# Patient Record
Sex: FEMALE | Race: OTHER | Employment: OTHER | URBAN - METROPOLITAN AREA
[De-identification: names, ages, dates, MRNs, and addresses within clinical notes are randomized per-mention and may not be internally consistent; named-entity substitution may affect disease eponyms.]

---

## 2022-02-16 ENCOUNTER — APPOINTMENT (OUTPATIENT)
Dept: GENERAL RADIOLOGY | Age: 57
End: 2022-02-16
Payer: MEDICARE

## 2022-02-16 ENCOUNTER — HOSPITAL ENCOUNTER (OUTPATIENT)
Age: 57
Setting detail: OBSERVATION
Discharge: HOME | End: 2022-02-19
Attending: STUDENT IN AN ORGANIZED HEALTH CARE EDUCATION/TRAINING PROGRAM | Admitting: INTERNAL MEDICINE
Payer: MEDICARE

## 2022-02-16 ENCOUNTER — APPOINTMENT (OUTPATIENT)
Dept: CT IMAGING | Age: 57
End: 2022-02-16
Payer: MEDICARE

## 2022-02-16 DIAGNOSIS — G89.29 OTHER CHRONIC PAIN: ICD-10-CM

## 2022-02-16 DIAGNOSIS — M54.9 CHRONIC BACK PAIN, UNSPECIFIED BACK LOCATION, UNSPECIFIED BACK PAIN LATERALITY: ICD-10-CM

## 2022-02-16 DIAGNOSIS — J45.909 UNCOMPLICATED ASTHMA, UNSPECIFIED ASTHMA SEVERITY, UNSPECIFIED WHETHER PERSISTENT: ICD-10-CM

## 2022-02-16 DIAGNOSIS — J44.9 CHRONIC OBSTRUCTIVE PULMONARY DISEASE, UNSPECIFIED COPD TYPE (HCC): ICD-10-CM

## 2022-02-16 DIAGNOSIS — S09.90XA CLOSED HEAD INJURY, INITIAL ENCOUNTER: ICD-10-CM

## 2022-02-16 DIAGNOSIS — G89.29 CHRONIC BACK PAIN, UNSPECIFIED BACK LOCATION, UNSPECIFIED BACK PAIN LATERALITY: ICD-10-CM

## 2022-02-16 DIAGNOSIS — F32.A DEPRESSION: ICD-10-CM
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
## 2022-02-16 LAB
ALBUMIN SERPL-MCNC: 4.4 G/DL (ref 3.5–4.6)
ALP BLD-CCNC: 119 U/L (ref 40–130)
ALT SERPL-CCNC: 30 U/L (ref 0–33)
ANION GAP SERPL CALCULATED.3IONS-SCNC: 14 MEQ/L (ref 9–15)
AST SERPL-CCNC: 26 U/L (ref 0–35)
BILIRUB SERPL-MCNC: <0.2 MG/DL (ref 0.2–0.7)
BUN BLDV-MCNC: 17 MG/DL (ref 6–20)
CALCIUM SERPL-MCNC: 8.8 MG/DL (ref 8.5–9.9)
CHLORIDE BLD-SCNC: 101 MEQ/L (ref 95–107)
CO2: 26 MEQ/L (ref 20–31)
CREAT SERPL-MCNC: 1.51 MG/DL (ref 0.5–0.9)
ETHANOL PERCENT: NORMAL G/DL
ETHANOL: <10 MG/DL (ref 0–0.08)
GFR AFRICAN AMERICAN: 42.7
GFR NON-AFRICAN AMERICAN: 35.3
GLOBULIN: 2.8 G/DL (ref 2.3–3.5)
GLUCOSE BLD-MCNC: 85 MG/DL (ref 70–99)
MAGNESIUM: 2.4 MG/DL (ref 1.7–2.4)
POTASSIUM SERPL-SCNC: 3.1 MEQ/L (ref 3.4–4.9)
SODIUM BLD-SCNC: 141 MEQ/L (ref 135–144)
TOTAL CK: 238 U/L (ref 0–170)
TOTAL PROTEIN: 7.2 G/DL (ref 6.3–8)

## 2022-02-16 PROCEDURE — 80307 DRUG TEST PRSMV CHEM ANLYZR: CPT

## 2022-02-16 PROCEDURE — 2580000003 HC RX 258: Performed by: STUDENT IN AN ORGANIZED HEALTH CARE EDUCATION/TRAINING PROGRAM

## 2022-02-16 PROCEDURE — 6360000002 HC RX W HCPCS: Performed by: STUDENT IN AN ORGANIZED HEALTH CARE EDUCATION/TRAINING PROGRAM

## 2022-02-16 PROCEDURE — 73502 X-RAY EXAM HIP UNI 2-3 VIEWS: CPT

## 2022-02-16 PROCEDURE — 84484 ASSAY OF TROPONIN QUANT: CPT

## 2022-02-16 PROCEDURE — 36415 COLL VENOUS BLD VENIPUNCTURE: CPT

## 2022-02-16 PROCEDURE — 81001 URINALYSIS AUTO W/SCOPE: CPT

## 2022-02-16 PROCEDURE — 71046 X-RAY EXAM CHEST 2 VIEWS: CPT

## 2022-02-16 PROCEDURE — 82077 ASSAY SPEC XCP UR&BREATH IA: CPT

## 2022-02-16 PROCEDURE — 70450 CT HEAD/BRAIN W/O DYE: CPT

## 2022-02-16 PROCEDURE — 82550 ASSAY OF CK (CPK): CPT

## 2022-02-16 PROCEDURE — 83735 ASSAY OF MAGNESIUM: CPT

## 2022-02-16 PROCEDURE — 6370000000 HC RX 637 (ALT 250 FOR IP): Performed by: STUDENT IN AN ORGANIZED HEALTH CARE EDUCATION/TRAINING PROGRAM

## 2022-02-16 PROCEDURE — 82553 CREATINE MB FRACTION: CPT

## 2022-02-16 PROCEDURE — 84443 ASSAY THYROID STIM HORMONE: CPT

## 2022-02-16 PROCEDURE — 80053 COMPREHEN METABOLIC PANEL: CPT

## 2022-02-16 PROCEDURE — 96365 THER/PROPH/DIAG IV INF INIT: CPT

## 2022-02-16 RX ORDER — ACETAMINOPHEN 500 MG
1000 TABLET ORAL ONCE
Status: COMPLETED | OUTPATIENT
Start: 2022-02-16 | End: 2022-02-16

## 2022-02-16 RX ORDER — METHOCARBAMOL 500 MG/1
1500 TABLET, FILM COATED ORAL ONCE
Status: COMPLETED | OUTPATIENT
Start: 2022-02-16 | End: 2022-02-16

## 2022-02-16 RX ORDER — MAGNESIUM SULFATE IN WATER 40 MG/ML
2000 INJECTION, SOLUTION INTRAVENOUS ONCE
Status: COMPLETED | OUTPATIENT
Start: 2022-02-16 | End: 2022-02-16

## 2022-02-16 RX ORDER — 0.9 % SODIUM CHLORIDE 0.9 %
1000 INTRAVENOUS SOLUTION INTRAVENOUS ONCE
Status: COMPLETED | OUTPATIENT
Start: 2022-02-16 | End: 2022-02-17

## 2022-02-16 RX ADMIN — METHOCARBAMOL 1500 MG: 500 TABLET ORAL at 22:06

## 2022-02-16 RX ADMIN — SODIUM CHLORIDE 1000 ML: 9 INJECTION, SOLUTION INTRAVENOUS at 22:07

## 2022-02-16 RX ADMIN — ACETAMINOPHEN 1000 MG: 500 TABLET ORAL at 22:06

## 2022-02-16 RX ADMIN — MAGNESIUM SULFATE HEPTAHYDRATE 2000 MG: 2 INJECTION, SOLUTION INTRAVENOUS at 22:07

## 2022-02-16 ASSESSMENT — ENCOUNTER SYMPTOMS
SHORTNESS OF BREATH: 0
NAUSEA: 0
VOMITING: 0
BACK PAIN: 1
EYE PAIN: 0
ABDOMINAL PAIN: 0
FACIAL SWELLING: 0

## 2022-02-16 ASSESSMENT — PAIN SCALES - GENERAL
PAINLEVEL_OUTOF10: 10
PAINLEVEL_OUTOF10: 9

## 2022-02-16 ASSESSMENT — PAIN - FUNCTIONAL ASSESSMENT: PAIN_FUNCTIONAL_ASSESSMENT: 0-10

## 2022-02-17 ENCOUNTER — APPOINTMENT (OUTPATIENT)
Dept: ULTRASOUND IMAGING | Age: 57
End: 2022-02-17
Payer: MEDICARE

## 2022-02-17 ENCOUNTER — APPOINTMENT (OUTPATIENT)
Dept: CT IMAGING | Age: 57
End: 2022-02-17
Payer: MEDICARE

## 2022-02-17 LAB
AMPHETAMINE SCREEN, URINE: ABNORMAL
ANION GAP SERPL CALCULATED.3IONS-SCNC: 12 MEQ/L (ref 9–15)
BACTERIA: NEGATIVE /HPF
BARBITURATE SCREEN URINE: ABNORMAL
BASE EXCESS ARTERIAL: -1 (ref -3–3)
BASOPHILS ABSOLUTE: 0 K/UL (ref 0–0.2)
BASOPHILS ABSOLUTE: 0 K/UL (ref 0–0.2)
BASOPHILS RELATIVE PERCENT: 0.3 %
BASOPHILS RELATIVE PERCENT: 0.5 %
BENZODIAZEPINE SCREEN, URINE: ABNORMAL
BILIRUBIN URINE: NEGATIVE
BLOOD, URINE: NEGATIVE
BUN BLDV-MCNC: 14 MG/DL (ref 6–20)
CALCIUM IONIZED: 1.01 MMOL/L (ref 1.12–1.32)
CALCIUM SERPL-MCNC: 8.1 MG/DL (ref 8.5–9.9)
CANNABINOID SCREEN URINE: POSITIVE
CHLORIDE BLD-SCNC: 108 MEQ/L (ref 95–107)
CK MB: 2.5 NG/ML (ref 0–3.8)
CLARITY: CLEAR
CO2: 25 MEQ/L (ref 20–31)
COCAINE METABOLITE SCREEN URINE: ABNORMAL
COLOR: YELLOW
CREAT SERPL-MCNC: 1.09 MG/DL (ref 0.5–0.9)
CREATINE KINASE-MB INDEX: 1.1 % (ref 0–3.5)
D DIMER: 0.3 MG/L FEU (ref 0–0.5)
D DIMER: 0.3 MG/L FEU (ref 0–0.5)
EOSINOPHILS ABSOLUTE: 0 K/UL (ref 0–0.7)
EOSINOPHILS ABSOLUTE: 0 K/UL (ref 0–0.7)
EOSINOPHILS RELATIVE PERCENT: 0.1 %
EOSINOPHILS RELATIVE PERCENT: 0.1 %
EPITHELIAL CELLS, UA: NORMAL /HPF (ref 0–5)
GFR AFRICAN AMERICAN: 56
GFR AFRICAN AMERICAN: >60
GFR NON-AFRICAN AMERICAN: 46
GFR NON-AFRICAN AMERICAN: 51.4
GLUCOSE BLD-MCNC: 103 MG/DL (ref 70–99)
GLUCOSE BLD-MCNC: 110 MG/DL (ref 70–99)
GLUCOSE URINE: NEGATIVE MG/DL
HBA1C MFR BLD: 6.3 % (ref 4.8–5.9)
HCO3 ARTERIAL: 24.1 MMOL/L (ref 21–29)
HCT VFR BLD CALC: 37 % (ref 37–47)
HCT VFR BLD CALC: 37.2 % (ref 37–47)
HEMOGLOBIN: 12 GM/DL (ref 12–16)
HEMOGLOBIN: 12.4 G/DL (ref 12–16)
HEMOGLOBIN: 12.4 G/DL (ref 12–16)
HYALINE CASTS: NORMAL /HPF (ref 0–5)
KETONES, URINE: NEGATIVE MG/DL
LACTATE: 0.79 MMOL/L (ref 0.4–2)
LEUKOCYTE ESTERASE, URINE: ABNORMAL
LV EF: 60 %
LVEF MODALITY: NORMAL
LYMPHOCYTES ABSOLUTE: 2.2 K/UL (ref 1–4.8)
LYMPHOCYTES ABSOLUTE: 2.7 K/UL (ref 1–4.8)
LYMPHOCYTES RELATIVE PERCENT: 56.3 %
LYMPHOCYTES RELATIVE PERCENT: 57.3 %
Lab: ABNORMAL
MAGNESIUM: 2.8 MG/DL (ref 1.7–2.4)
MCH RBC QN AUTO: 31.5 PG (ref 27–31.3)
MCH RBC QN AUTO: 31.7 PG (ref 27–31.3)
MCHC RBC AUTO-ENTMCNC: 33.2 % (ref 33–37)
MCHC RBC AUTO-ENTMCNC: 33.5 % (ref 33–37)
MCV RBC AUTO: 94.8 FL (ref 82–100)
MCV RBC AUTO: 94.9 FL (ref 82–100)
METHADONE SCREEN, URINE: ABNORMAL
MONOCYTES ABSOLUTE: 0.3 K/UL (ref 0.2–0.8)
MONOCYTES ABSOLUTE: 0.4 K/UL (ref 0.2–0.8)
MONOCYTES RELATIVE PERCENT: 8.7 %
MONOCYTES RELATIVE PERCENT: 8.9 %
NEUTROPHILS ABSOLUTE: 1.3 K/UL (ref 1.4–6.5)
NEUTROPHILS ABSOLUTE: 1.6 K/UL (ref 1.4–6.5)
NEUTROPHILS RELATIVE PERCENT: 33.4 %
NEUTROPHILS RELATIVE PERCENT: 34.4 %
NITRITE, URINE: NEGATIVE
O2 SAT, ARTERIAL: 89 % (ref 93–100)
OPIATE SCREEN URINE: ABNORMAL
OXYCODONE URINE: ABNORMAL
PCO2 ARTERIAL: 40 MM HG (ref 35–45)
PDW BLD-RTO: 16.8 % (ref 11.5–14.5)
PDW BLD-RTO: 16.9 % (ref 11.5–14.5)
PERFORMED ON: ABNORMAL
PH ARTERIAL: 7.38 (ref 7.35–7.45)
PH UA: 5 (ref 5–9)
PHENCYCLIDINE SCREEN URINE: ABNORMAL
PLATELET # BLD: 223 K/UL (ref 130–400)
PLATELET # BLD: 230 K/UL (ref 130–400)
PO2 ARTERIAL: 57 MM HG (ref 75–108)
POC CHLORIDE: 108 MEQ/L (ref 99–110)
POC CREATININE: 1.2 MG/DL (ref 0.6–1.2)
POC HEMATOCRIT: 35 % (ref 36–48)
POC POTASSIUM: 2.7 MEQ/L (ref 3.5–5.1)
POC SAMPLE TYPE: ABNORMAL
POC SODIUM: 145 MEQ/L (ref 136–145)
POTASSIUM REFLEX MAGNESIUM: 3.5 MEQ/L (ref 3.4–4.9)
PROPOXYPHENE SCREEN: ABNORMAL
PROTEIN UA: NEGATIVE MG/DL
RBC # BLD: 3.9 M/UL (ref 4.2–5.4)
RBC # BLD: 3.92 M/UL (ref 4.2–5.4)
RBC UA: NORMAL /HPF (ref 0–5)
SODIUM BLD-SCNC: 145 MEQ/L (ref 135–144)
SPECIFIC GRAVITY UA: 1.01 (ref 1–1.03)
TCO2 ARTERIAL: 25 MMOL/L (ref 21–32)
TROPONIN: <0.01 NG/ML (ref 0–0.01)
TSH REFLEX: 2.49 UIU/ML (ref 0.44–3.86)
URINE REFLEX TO CULTURE: ABNORMAL
UROBILINOGEN, URINE: 0.2 E.U./DL
WBC # BLD: 3.9 K/UL (ref 4.8–10.8)
WBC # BLD: 4.7 K/UL (ref 4.8–10.8)
WBC UA: NORMAL /HPF (ref 0–5)

## 2022-02-17 PROCEDURE — 94664 DEMO&/EVAL PT USE INHALER: CPT

## 2022-02-17 PROCEDURE — 85025 COMPLETE CBC W/AUTO DIFF WBC: CPT

## 2022-02-17 PROCEDURE — 82330 ASSAY OF CALCIUM: CPT

## 2022-02-17 PROCEDURE — 84484 ASSAY OF TROPONIN QUANT: CPT

## 2022-02-17 PROCEDURE — 93306 TTE W/DOPPLER COMPLETE: CPT

## 2022-02-17 PROCEDURE — 87276 INFLUENZA A AG IF: CPT

## 2022-02-17 PROCEDURE — 84132 ASSAY OF SERUM POTASSIUM: CPT

## 2022-02-17 PROCEDURE — 83036 HEMOGLOBIN GLYCOSYLATED A1C: CPT

## 2022-02-17 PROCEDURE — 87260 ADENOVIRUS AG IF: CPT

## 2022-02-17 PROCEDURE — 83605 ASSAY OF LACTIC ACID: CPT

## 2022-02-17 PROCEDURE — 93970 EXTREMITY STUDY: CPT

## 2022-02-17 PROCEDURE — 99223 1ST HOSP IP/OBS HIGH 75: CPT | Performed by: INTERNAL MEDICINE

## 2022-02-17 PROCEDURE — 83735 ASSAY OF MAGNESIUM: CPT

## 2022-02-17 PROCEDURE — 36600 WITHDRAWAL OF ARTERIAL BLOOD: CPT

## 2022-02-17 PROCEDURE — 87279 PARAINFLUENZA AG IF: CPT

## 2022-02-17 PROCEDURE — 93880 EXTRACRANIAL BILAT STUDY: CPT

## 2022-02-17 PROCEDURE — 80048 BASIC METABOLIC PNL TOTAL CA: CPT

## 2022-02-17 PROCEDURE — 85014 HEMATOCRIT: CPT

## 2022-02-17 PROCEDURE — 85379 FIBRIN DEGRADATION QUANT: CPT

## 2022-02-17 PROCEDURE — 6370000000 HC RX 637 (ALT 250 FOR IP): Performed by: STUDENT IN AN ORGANIZED HEALTH CARE EDUCATION/TRAINING PROGRAM

## 2022-02-17 PROCEDURE — G0378 HOSPITAL OBSERVATION PER HR: HCPCS

## 2022-02-17 PROCEDURE — 36415 COLL VENOUS BLD VENIPUNCTURE: CPT

## 2022-02-17 PROCEDURE — 87275 INFLUENZA B AG IF: CPT

## 2022-02-17 PROCEDURE — 84295 ASSAY OF SERUM SODIUM: CPT

## 2022-02-17 PROCEDURE — 6360000004 HC RX CONTRAST MEDICATION: Performed by: INTERNAL MEDICINE

## 2022-02-17 PROCEDURE — 2580000003 HC RX 258: Performed by: INTERNAL MEDICINE

## 2022-02-17 PROCEDURE — 71275 CT ANGIOGRAPHY CHEST: CPT

## 2022-02-17 PROCEDURE — 82803 BLOOD GASES ANY COMBINATION: CPT

## 2022-02-17 PROCEDURE — 82435 ASSAY OF BLOOD CHLORIDE: CPT

## 2022-02-17 PROCEDURE — 99285 EMERGENCY DEPT VISIT HI MDM: CPT

## 2022-02-17 PROCEDURE — 93005 ELECTROCARDIOGRAM TRACING: CPT | Performed by: INTERNAL MEDICINE

## 2022-02-17 PROCEDURE — 82565 ASSAY OF CREATININE: CPT

## 2022-02-17 PROCEDURE — 6370000000 HC RX 637 (ALT 250 FOR IP): Performed by: INTERNAL MEDICINE

## 2022-02-17 PROCEDURE — 87299 ANTIBODY DETECTION NOS IF: CPT

## 2022-02-17 PROCEDURE — 87280 RESPIRATORY SYNCYTIAL AG IF: CPT

## 2022-02-17 RX ORDER — IPRATROPIUM BROMIDE AND ALBUTEROL SULFATE 2.5; .5 MG/3ML; MG/3ML
1 SOLUTION RESPIRATORY (INHALATION) EVERY 4 HOURS PRN
Status: DISCONTINUED | OUTPATIENT
Start: 2022-02-17 | End: 2022-02-19 | Stop reason: HOSPADM

## 2022-02-17 RX ORDER — POTASSIUM CHLORIDE 20 MEQ/1
40 TABLET, EXTENDED RELEASE ORAL ONCE
Status: COMPLETED | OUTPATIENT
Start: 2022-02-17 | End: 2022-02-17

## 2022-02-17 RX ORDER — TIZANIDINE 4 MG/1
4 TABLET ORAL EVERY 8 HOURS PRN
COMMUNITY
End: 2022-09-13

## 2022-02-17 RX ORDER — SODIUM CHLORIDE 9 MG/ML
INJECTION, SOLUTION INTRAVENOUS
Status: DISPENSED
Start: 2022-02-17 | End: 2022-02-18

## 2022-02-17 RX ORDER — PROPRANOLOL HYDROCHLORIDE 40 MG/1
40 TABLET ORAL 2 TIMES DAILY
COMMUNITY

## 2022-02-17 RX ORDER — ONDANSETRON 2 MG/ML
4 INJECTION INTRAMUSCULAR; INTRAVENOUS EVERY 6 HOURS PRN
Status: DISCONTINUED | OUTPATIENT
Start: 2022-02-17 | End: 2022-02-19 | Stop reason: HOSPADM

## 2022-02-17 RX ORDER — ACETAMINOPHEN 325 MG/1
650 TABLET ORAL EVERY 6 HOURS PRN
Status: DISCONTINUED | OUTPATIENT
Start: 2022-02-17 | End: 2022-02-19 | Stop reason: HOSPADM

## 2022-02-17 RX ORDER — PREDNISONE 20 MG/1
40 TABLET ORAL DAILY
Status: DISCONTINUED | OUTPATIENT
Start: 2022-02-17 | End: 2022-02-19 | Stop reason: HOSPADM

## 2022-02-17 RX ORDER — TOPIRAMATE 100 MG/1
100 TABLET, FILM COATED ORAL 2 TIMES DAILY
COMMUNITY

## 2022-02-17 RX ORDER — SODIUM CHLORIDE, SODIUM LACTATE, POTASSIUM CHLORIDE, CALCIUM CHLORIDE 600; 310; 30; 20 MG/100ML; MG/100ML; MG/100ML; MG/100ML
INJECTION, SOLUTION INTRAVENOUS CONTINUOUS
Status: DISCONTINUED | OUTPATIENT
Start: 2022-02-17 | End: 2022-02-19 | Stop reason: HOSPADM

## 2022-02-17 RX ORDER — TOPIRAMATE 25 MG/1
25 TABLET ORAL 2 TIMES DAILY
COMMUNITY

## 2022-02-17 RX ORDER — SODIUM CHLORIDE 0.9 % (FLUSH) 0.9 %
10 SYRINGE (ML) INJECTION 2 TIMES DAILY
Status: DISCONTINUED | OUTPATIENT
Start: 2022-02-17 | End: 2022-02-19 | Stop reason: HOSPADM

## 2022-02-17 RX ORDER — FLUTICASONE PROPIONATE 50 MCG
1 SPRAY, SUSPENSION (ML) NASAL DAILY
Status: DISCONTINUED | OUTPATIENT
Start: 2022-02-17 | End: 2022-02-19 | Stop reason: HOSPADM

## 2022-02-17 RX ORDER — IPRATROPIUM BROMIDE AND ALBUTEROL SULFATE 2.5; .5 MG/3ML; MG/3ML
1 SOLUTION RESPIRATORY (INHALATION)
Status: DISCONTINUED | OUTPATIENT
Start: 2022-02-17 | End: 2022-02-17

## 2022-02-17 RX ORDER — PANTOPRAZOLE SODIUM 40 MG/1
40 GRANULE, DELAYED RELEASE ORAL
COMMUNITY
End: 2022-09-13

## 2022-02-17 RX ORDER — ACETAMINOPHEN 650 MG/1
650 SUPPOSITORY RECTAL EVERY 6 HOURS PRN
Status: DISCONTINUED | OUTPATIENT
Start: 2022-02-17 | End: 2022-02-19 | Stop reason: HOSPADM

## 2022-02-17 RX ORDER — SERTRALINE HYDROCHLORIDE 100 MG/1
100 TABLET, FILM COATED ORAL DAILY
Status: DISCONTINUED | OUTPATIENT
Start: 2022-02-17 | End: 2022-02-19 | Stop reason: HOSPADM

## 2022-02-17 RX ORDER — POLYETHYLENE GLYCOL 3350 17 G/17G
17 POWDER, FOR SOLUTION ORAL DAILY PRN
Status: DISCONTINUED | OUTPATIENT
Start: 2022-02-17 | End: 2022-02-19 | Stop reason: HOSPADM

## 2022-02-17 RX ORDER — ONDANSETRON 4 MG/1
4 TABLET, ORALLY DISINTEGRATING ORAL EVERY 8 HOURS PRN
Status: DISCONTINUED | OUTPATIENT
Start: 2022-02-17 | End: 2022-02-19 | Stop reason: HOSPADM

## 2022-02-17 RX ORDER — ROSUVASTATIN CALCIUM 40 MG/1
40 TABLET, COATED ORAL EVERY EVENING
Status: DISCONTINUED | OUTPATIENT
Start: 2022-02-17 | End: 2022-02-19 | Stop reason: HOSPADM

## 2022-02-17 RX ADMIN — POTASSIUM CHLORIDE 40 MEQ: 1500 TABLET, EXTENDED RELEASE ORAL at 02:24

## 2022-02-17 RX ADMIN — SODIUM CHLORIDE, POTASSIUM CHLORIDE, SODIUM LACTATE AND CALCIUM CHLORIDE: 600; 310; 30; 20 INJECTION, SOLUTION INTRAVENOUS at 06:09

## 2022-02-17 RX ADMIN — LEVOTHYROXINE SODIUM 75 MCG: 0.03 TABLET ORAL at 06:04

## 2022-02-17 RX ADMIN — IOPAMIDOL 100 ML: 612 INJECTION, SOLUTION INTRAVENOUS at 17:33

## 2022-02-17 RX ADMIN — Medication 650 MG: at 16:40

## 2022-02-17 RX ADMIN — ROSUVASTATIN CALCIUM 40 MG: 40 TABLET, FILM COATED ORAL at 20:41

## 2022-02-17 RX ADMIN — SODIUM CHLORIDE, POTASSIUM CHLORIDE, SODIUM LACTATE AND CALCIUM CHLORIDE: 600; 310; 30; 20 INJECTION, SOLUTION INTRAVENOUS at 16:38

## 2022-02-17 RX ADMIN — PREDNISONE 40 MG: 20 TABLET ORAL at 09:36

## 2022-02-17 RX ADMIN — Medication 650 MG: at 09:35

## 2022-02-17 RX ADMIN — POTASSIUM CHLORIDE 40 MEQ: 1500 TABLET, EXTENDED RELEASE ORAL at 06:04

## 2022-02-17 RX ADMIN — SERTRALINE 100 MG: 100 TABLET, FILM COATED ORAL at 09:36

## 2022-02-17 ASSESSMENT — PAIN SCALES - GENERAL
PAINLEVEL_OUTOF10: 10
PAINLEVEL_OUTOF10: 6

## 2022-02-17 ASSESSMENT — PAIN DESCRIPTION - DESCRIPTORS: DESCRIPTORS: ACHING

## 2022-02-17 ASSESSMENT — PAIN DESCRIPTION - LOCATION: LOCATION: GENERALIZED
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
## 2022-02-17 NOTE — PROGRESS NOTES
[facility] EMERGENCY [facility] AT [place] Respiratory Therapy Evaluation   Current Order:  Duoneb Q4 W/A     Home Regimen: PRN      Ordering Physician: Tanisha  Re-evaluation Date:  ---     Diagnosis: Weakness      Patient Status: Stable / Unstable + Physician notified    The following MDI Criteria must be met in order to convert aerosol to MDI with spacer. If unable to meet, MDI will be converted to aerosol:  []  Patient able to demonstrate the ability to use MDI effectively  []  Patient alert and cooperative  []  Patient able to take deep breath with 5-10 second hold  []  Medication(s) available in this delivery method   []  Peak flow greater than or equal to 200 ml/min            Current Order Substituted To  (same drug, same frequency)   Aerosol to MDI [] Albuterol Sulfate 0.083% unit dose by aerosol Albuterol Sulfate MDI 2 puffs by inhalation with spacer    [] Levalbuterol 1.25 mg unit dose by aerosol Levalbuterol MDI 2 puffs by inhalation with spacer    [] Levalbuterol 0.63 mg unit dose by aerosol Levalbuterol MDI 2 puffs by inhalation with spacer    [] Ipratropium Bromide 0.02% unit dose by aerosol Ipratropium Bromide MDI 2 puffs by inhalation with spacer    [] Duoneb (Ipratropium + Albuterol) unit dose by aerosol Ipratropium MDI + Albuterol MDI 2 puffs by inhalation w/spacer   MDI to Aerosol [] Albuterol Sulfate MDI Albuterol Sulfate 0.083% unit dose by aerosol    [] Levalbuterol MDI 2 puffs by inhalation Levalbuterol 1.25 mg unit dose by aerosol    [] Ipratropium Bromide MDI by inhalation Ipratropium Bromide 0.02% unit dose by aerosol    [] Combivent (Ipratropium + Albuterol) MDI by inhalation Duoneb (Ipratropium + Albuterol) unit dose by aerosol       Treatment Assessment [Frequency/Schedule]:  Change frequency to: ___________Duoneb Q4 PRN_______________________________________per Protocol, P&T, MEC      Points 0 1 2 3 4   Pulmonary Status  Non-Smoker  []   Smoking history   < 20 pack years  []   Smoking history  ?  20 pack years  [x] Pulmonary Disorder  (acute or chronic)  []   Severe or Chronic w/ Exacerbation  []     Surgical Status No [x]   Surgeries     General []   Surgery Lower []   Abdominal Thoracic or []   Upper Abdominal Thoracic with  PulmonaryDisorder  []     Chest X-ray Clear/Not  Ordered     []  Chronic Changes  Results Pending  [x]  Infiltrates, atelectasis, pleural effusion, or edema  []  Infiltrates in more than one lobe []  Infiltrate + Atelectasis, &/or pleural effusion  []    Respiratory Pattern Regular,  RR = 12-20 [x]  Increased,  RR = 21-25 []  ROBISON, irregular,  or RR = 26-30 []  Decreased FEV1  or RR = 31-35 []  Severe SOB, use  of accessory muscles, or RR ? 35  []    Mental Status Alert, oriented,  Cooperative [x]  Confused but Follows commands []  Lethargic or unable to follow commands []  Obtunded  []  Comatose  []    Breath Sounds Clear to  auscultation  []  Decreased unilaterally or  in bases only []  Decreased  bilaterally  [x]  Crackles or intermittent wheezes []  Wheezes []    Cough Strong, Spontan., & nonproductive [x]  Strong,  spontaneous, &  productive []  Weak,  Nonproductive []  Weak, productive or  with wheezes []  No spontaneous  cough or may require suctioning []    Level of Activity Ambulatory [x]  Ambulatory w/ Assist  []  Non-ambulatory []  Paraplegic []  Quadriplegic []    Total    Score:___5____     Triage Score:___5_____      Tri       Triage:     1. (>20) Freq: Q3    2. (16-20) Freq: Q4   3. (11-15) Freq: QID & Albuterol Q2 PRN    4. (6-10) Freq: TID & Albuterol Q2 PRN    5. (0-5) Freq Q4prn

## 2022-02-17 NOTE — PROGRESS NOTES
Agree with plan as per my colleagues H/P from today. Will check RVP. discussed with Lorenda Frankel.

## 2022-02-17 NOTE — ED TRIAGE NOTES
Pt presents to ER by squad after falling down about five steps just prior to arrival. The fall was witness by family. Family states patient did not hit her head, patient states she did hit her head. No loss of consciousness. Pt is complaining of pain in the posterior head and right hip pain. There are no obvious deformities or uncontrolled bleeding. Pt is pink, warm, dry, active and alert. There is no distress noted and vitals are stable. Pt takes Xarelto.

## 2022-02-17 NOTE — ED NOTES
Bed: 09  Expected date: 2/16/22  Expected time: 9:15 PM  Means of arrival: Ambulance  Comments:  59year old female left hip pain.       Aiarm Bettencourt RN  02/16/22 4470

## 2022-02-17 NOTE — FLOWSHEET NOTE
Pt arrived to the floor. Skin assessment performed with [facility] RN. Skin intact and pt has trace edema in the bilateral lower extremities.

## 2022-02-17 NOTE — H&P
Hospital Medicine  History and Physical    Patient:  Ellie Norris  MRN: [id number]    CHIEF COMPLAINT:    Chief Complaint   Patient presents with    Fall       History Obtained From:  patient, electronic medical record  Primary Care Physician: Shashank Tsang MD    HISTORY OF PRESENT ILLNESS:   The patient is a 59 y.o. female who presents with syncope from home twice today. Patient is a 70-year-old female she has a history of Chiari malformation type I status post craniotomy and surgical repair in [place], hypertension, hyperlipidemia, COPD/asthma overlap syndrome, hypothyroid disease, DVT anticoagulated on Eliquis, GERD without history of PUD, IBS, chronic pain, fibromyalgia, migraine headaches,. Patient states that she was in her nephew's room when he kicked her in a playful manner she tripped and fell over and on the symbol bunkbed hit her head on the box her family came to help her up and when they are walking on the stairs afterwards she had near syncope and collapsed on the stairs. Patient states she did not lose consciousness but felt dizzy and lightheaded with a rush of warmth prior to falling. This has not happened before this is not happened when she lived in [place] however she has had a reported history of heart failure in the past.  She has had some cardiac work-up including stress testing in the past but does not remember exactly when. Chart review also shows that she has been currently following with physical therapy throughout most of 2020 for frequent falls this was also [place]. On arrival to [facility] patient is afebrile vital signs are stable she saturates well on room air. Basic metabolic panel shows a potassium of 3.1 with a BUN and creatinine of 17 and 1.51 troponin is negative, LFTs benign. CBC is pending. EKG is pending. CT head shows no acute process does report chronic craniotomy.   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    24 hour urine studies and aldosterone test negative     Chronic pain     Deep venous thrombosis ([address] 2011    Depression     Essential hypertension     Fibromyalgia     GERD (gastroesophageal reflux disease)     Heat intolerance     Hip pain     History of craniotomy 2007    CHIARI MALFORMATION-[place], [place]    Hyperlipidemia 2009    IBS (irritable bowel syndrome)     Meralgia paraesthetica     Migraine headache     Obesity     Osteopenia     Pedal edema     Sleep apnea     Umbilical hernia     Uterine fibroid        Past Surgical History:      Procedure Laterality Date     SECTION      x3    COLONOSCOPY  2013    normal    CRANIOTOMY  2007    CHIARI MALFORMATION [place]    FIBULA FRACTURE SURGERY  2009    ORIF right tibia and fibula     ORIF FEMUR DECOMPRESSION      right    PATELLA SURGERY  2007    partial resection right patella     TONSILLECTOMY      UPPER GASTROINTESTINAL ENDOSCOPY  2013       Medications Prior to Admission:    Prior to Admission medications    Medication Sig Start Date End Date Taking? Authorizing Provider   gabapentin (NEURONTIN) 800 MG tablet Take 1 tablet by mouth 3 times daily 16   Vidya Severino MD   furosemide (LASIX) 40 MG tablet Take 1 tablet by mouth daily as needed (swelling) 16   Vidya Severino MD   ketoconazole (NIZORAL) 2 % cream Apply topically daily Apply topically daily.     Vidya Severino MD   albuterol sulfate HFA (PROVENTIL HFA) 108 (90 BASE) MCG/ACT inhaler Inhale 2 puffs into the lungs every 6 hours as needed for Wheezing 16  Vidya Severino MD   sertraline (ZOLOFT) 100 MG tablet 2 tabs daily 16   Vidya Severino MD   LYRICA 225 MG capsule Take 1 capsule by mouth 2 times daily 6/1/16   Hudson Olvera MD   nortriptyline (PAMELOR) 25 MG capsule Take 50 mg by mouth nightly    Historical Provider, MD   rosuvastatin (CRESTOR) 40 MG tablet Take 1 tablet by mouth every evening 6/16/16   Nisha Sebastian MD   potassium chloride SA (K-DUR;KLOR-CON M) 20 MEQ tablet Take 1 tablet by mouth daily 6/16/16   Nisha Sebastian MD   baclofen (LIORESAL) 10 MG tablet Take 1 tablet by mouth 3 times daily 6/16/16   Nisha Sebastian MD   triamterene-hydrochlorothiazide [facility]) 37.5-25 MG per tablet Take 1 tablet by mouth daily 6/16/16   Nisha Sebastian MD   flut[facility]asone [facility]) 220 MCG/ACT inhaler Inhale 2 puffs into the lungs 2 times daily 6/16/16 6/16/17  Nisha Sebastian MD   levothyroxine (LEVOTHROID) 75 MCG tablet Take 1 tablet by mouth daily 6/16/16   Nisha Sebastian MD   tiotropium (Buzz Lean) 18 MCG inhalation capsule Inhale 1 capsule into the lungs daily 6/16/16   Nisha Sebastian MD   rosuvastatin (CRESTOR) 40 MG tablet Take 1 tablet by mouth every evening 4/12/16   Nisha Sebastian MD   DULoxetine (CYMBALTA) 60 MG capsule Take 1 capsule by mouth 2 times daily 3/16/16   Nisha Sebastian MD   dicyclomine (BENTYL) 20 MG tablet Take 1 tablet by mouth three times daily 3/16/16   Nisha Sebastian MD   fluticasone [facility]) 50 MCG/ACT nasal spray 1 spray by Nasal route daily. 4/21/15   Nisha Sebastian MD       Allergies:  Latex, Aspirin, Niaspan [niacin er], and Simvastatin    Social History:   TOBACCO:   reports that she quit smoking about 5 years ago. Her smoking use included cigarettes. She smoked 0.25 packs per day. She has never used smokeless tobacco.  ETOH:   reports no history of alcohol use.   OCCUPATION: None    Family History:       Problem Relation Age of Onset    Other Father         ALS    Cancer Maternal Grandmother     Cancer Maternal Grandfather     Cancer Paternal Grandmother     Cancer Paternal Grandfather        REVIEW OF SYSTEMS:  Ten systems reviewed and negative except for as above. Physical Exam:    Vitals: BP (!) 91/55   Pulse 76   Temp 97.7 °F (36.5 °C) (Oral)   Resp 13   Ht 5' 5\" (1.651 m)   Wt 202 lb (91.6 kg)   LMP 07/30/2014   SpO2 97%   BMI 33.61 kg/m²   Constitutional: alert, appears stated age and cooperative  Skin: Skin color, texture, turgor normal. No rashes or lesions  Eyes:Eye: Normal external eye, conjunctiva, LUIS ANTONIO. ENT: Head: Normocephalic, no lesions, without obvious abnormality. Neck: no adenopathy, no carotid bruit, no JVD, supple, symmetrical, trachea midline and thyroid not enlarged, symmetric, no tenderness/mass/nodules  Respiratory: Coarse wheezes throughout bilateral breath sounds good air movement  Cardiovascular: regular rate and rhythm, S1, S2 normal, no murmur, click, rub or gallop  Gastrointestinal: soft, non-tender; bowel sounds normal; no masses,  no organomegaly  Genitourinary: Deferred  Musculoskeletal:extremities normal, atraumatic, no cyanosis or edema  Neurologic: Mental status AAOx3 No facial asymmetry or droop. Normal muscle strength b/l. Psychiatric: Appropriate mood and affect. Good insight and judgement  Hematologic: No obvious bruising or bleeding    Recent Labs     02/17/22 0221   HGB 12.0     Recent Labs     02/16/22 2230 02/17/22 0221     --    K 3.1*  --      --    CO2 26  --    BUN 17  --    CREATININE 1.51* 1.2   GLUCOSE 85  --    AST 26  --    ALT 30  --    BILITOT <0.2  --    ALKPHOS 119  --      Troponin T:   Recent Labs     02/16/22 2230   TROPONINI <0.010       ABGs:   Lab Results   Component Value Date    PHART 7.384 02/17/2022    PO2ART 57 02/17/2022    TCX3ZKA 40 02/17/2022     INR: No results for input(s): INR in the last 72 hours.   URINALYSIS:  Recent Labs     02/16/22 2230   COLORU Yellow   PHUR 5.0   WBCUA 0-2   RBCUA 0-2   BACTERIA Negative   CLARITYU Clear   SPECGRAV 1.014   LEUKOCYTESUR TRACE*   UROBILINOGEN 0.2   BILIRUBINUR Negative   BLOODU Negative   GLUCOSEU Negative     -----------------------------------------------------------------   No results found. EKG: Ordered, pending    Assessment and Plan   Recurrent falls with near syncope: Consult to cardiology continue telemetry for concern of potential arrhythmias. Consult to PT and OT for physical therapy evaluations given patient recently completed physical therapy in [place]. Neurochecks for any neurologic changes given that she is on anticoagulation hit her head on the fall. Hold off all for sedating medications    Diffuse wheezing: Concern for asthma exacerbation versus development of asthma COPD overlap syndrome. Prednisone by mouth x5 doses bronchodilators and consult pulmonary to establish care and right for outpatient PFTs following improvement    GERD: PPI    Hypothyroid disease continue Synthroid, TSH okay  Hypokalemia: Replace potassium repeat BMP  DVT/PE continue anticoagulation  Thrush: Nystatin swish and swallow  Anxiety: Continue Zoloft  Hyperlipidemia continue Crestor  DVT prophylaxis indicated on Eliquis.     

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
## 2022-02-17 NOTE — ED NOTES
Report given to [facility]. Pt stable at this moment. Pt connected to cardiac monitor. Pt has no needs or concerns upon transfer.       John funes RN  02/17/22 1068

## 2022-02-17 NOTE — CARE COORDINATION
[facility] [place] Case Management Initial Discharge Assessment    Met with Patient to discuss discharge plan. PCP: José Miguel Xiong MD                                Date of Last Visit: Jessika Marroquin 2022    If no PCP, list provided? N/A    Discharge Planning    Living Arrangements: independently at home    Who do you live with? DTR AND DTR'S FIANCE    Who helps you with your care:  self    If lives at home:     Do you have any barriers navigating in your home? yes -     Patient can perform ADL? Yes  - NEEDS HELP    Current Services (outpatient and in home) :  None    Dialysis: No    Is transportation available to get to your appointments? Yes  Pt drives    DME Equipment:  yes - Rollator,walker,cane,bath chair    Respiratory equipment: CPAP without O2    Respiratory provider:  no     Pharmacy:  yes - [address] [place] [address] with Medication Assistance Program?  No      Patient agreeable to [facility]? Yes, [employer]    Patient agreeable to SNF/Rehab? Will see what PT/OT says. Other discharge needs identified? N/A    Does Patient Have a High-Risk for Readmission Diagnosis (CHF, PN, MI, COPD)? No       Initial Discharge Plan? (Note: please see concurrent daily documentation for any updates after initial note). Called and spoke to patient re: dc plan. She lives in [place] w [facility] but is here staying with niece and is moving here. She is agreeable to [facility] if needed. PT/OT on consult and have not seen her yet. She is staying on [address] [facility] and will give us exact address once she has it.      Readmission Risk              Risk of Unplanned Readmission:  0         Electronically signed by Angi Long on 2/17/2022 at 12:12 PM

## 2022-02-17 NOTE — FLOWSHEET NOTE
0600- Pt arrived to the floor. Skin assessment performed with Jacqualine [place]. Skin intact and pt has trace edema in the bilateral lower extremities.  Electronically signed by Zack Tierney RN on 2/17/2022 at 7:09 AM

## 2022-02-17 NOTE — ED NOTES
Pure wick catheter placed on patient and connected to suction.        Giovana Ojeda RN  02/17/22 2883

## 2022-02-17 NOTE — CONSULTS
Pulmonary Medicine  Consult Note      Reason for consultation:    Consulting physician: Dr. Yessy Moe:    This is 56-year-old female with history of Chiari malformation type I status post craniotomy and surgical repair in [facility], hypertension, hyperlipidemia, COPD and asthma, hypothyroidism, DVT on Eliquis, GERD, IBS, chronic pain, fibromyalgia, migraine. She said while she was in her nephew's room when he kicked her in playful manner and she fell backward. Attempt to help her but she had near syncope and collapse on the stair. She did not lose consciousness but felt dizzy lightheaded. When she came to ER she had diffuse wheezes throughout both her lungs. CT head showed no acute process but did report chronic craniotomy. She said she smokes cigarettes occasionally, uses medical marijuana. She is currently on 2 L O2 via nasal cannula her O2 saturation 98%. Pulmonary consulted due to wheezing and possible asthma COPD exacerbation. She said she does use bronchodilator therapy at home. She states she only has rescue inhaler with albuterol but wants to have a nebulizer.     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    24 hour urine studies and aldosterone test negative     Chronic pain     Deep venous thrombosis ([address] 2011    Depression     Essential hypertension     Fibromyalgia     GERD (gastroesophageal reflux disease)     Heat intolerance     Hip pain     History of craniotomy     CHIARI MALFORMATION-[place], [place]    Hyperlipidemia 2009    IBS (irritable bowel syndrome)     Meralgia paraesthetica     Migraine headache     Obesity     Osteopenia     Pedal edema     Sleep apnea     Umbilical hernia     Uterine fibroid        Past Surgical History:        Procedure Laterality Date     SECTION      x3    COLONOSCOPY  2013    normal    CRANIOTOMY  2007    CHIARI MALFORMATION [place]    FIBULA FRACTURE SURGERY  2009    ORIF right tibia and fibula     ORIF FEMUR DECOMPRESSION      right    PATELLA SURGERY  2007    partial resection right patella     TONSILLECTOMY      UPPER GASTROINTESTINAL ENDOSCOPY  4/2013       Social History:     reports that she quit smoking about 5 years ago. Her smoking use included cigarettes. She smoked 0.25 packs per day. She has never used smokeless tobacco. She reports that she does not drink alcohol and does not use drugs. Family History:       Problem Relation Age of Onset    Other Father         ALS    Cancer Maternal Grandmother     Cancer Maternal Grandfather     Cancer Paternal Grandmother     Cancer Paternal Grandfather        Allergies:  Latex, Aspirin, Niaspan [niacin er], and Simvastatin        MEDICATIONS during current hospitalization:    Continuous Infusions:   lactated ringers 100 mL/hr at 02/17/22 0609       Scheduled Meds:   predniSONE  40 mg Oral Daily    nystatin  5 mL Oral 4x Daily    sertraline  100 mg Oral Daily    rosuvastatin  40 mg Oral QPM    levothyroxine  75 mcg Oral Daily    fluticasone  1 spray Nasal Daily    [START ON 2/18/2022] apixaban  5 mg Oral BID       PRN Meds:ondansetron **OR** ondansetron, polyethylene glycol, acetaminophen **OR** acetaminophen, ipratropium-albuterol    REVIEW OF SYSTEMS:  As in history of present illness  Other 14 point review of system is negative. PHYSICAL EXAM:    Vitals:  /83   Pulse 76   Temp 97.3 °F (36.3 °C) (Oral)   Resp 16   Ht 5' 5\" (1.651 m)   Wt 202 lb (91.6 kg)   LMP 07/30/2014   SpO2 98%   BMI 33.61 kg/m²   General: Alert, awake, Oriented x3  .comfortable in bed, No distress. Head: Atraumatic , Normocephalic   Eyes: PERRL. No sclera icterus. No conjunctival injection. No discharge   ENT: No nasal  discharge. Oral thrush  Neck:  Trachea midline. No thyromegaly, no JVD, No cervical adenopathy.   Chest : Bilaterally symmetrical ,Normal effort,  No accessory muscle use  Lung : Diminished BS bilateraly. No Rales. Minimal expiratory wheezing. No rhonchi. Heart[de-identified] Normal  rate. Regular rhythm. No mumur ,  Rub or gallop  ABD: Non-tender. Non-distended. No masses. No organmegaly. Normal bowel sounds. No hernia. Extremities: No pitting in both lower leg , No Cyanosis ,No clubbing  Neuro:no cranial nerve abnormality, normal reflex and sensation, no focal weakness   Skin: Warm and dry. No erythema rash on exposed extremities. Data Review  Recent Labs     02/17/22 0221 02/17/22 0602   WBC  --  4.7*   HGB 12.0 12.4   HCT  --  37.0   PLT  --  223      Recent Labs     02/16/22 2230 02/17/22 0221 02/17/22 0602     --  145*   K 3.1*  --  3.5     --  108*   CO2 26  --  25   BUN 17  --  14   CREATININE 1.51* 1.2 1.09*   GLUCOSE 85  --  110*       MV Settings: ABGs:   Recent Labs     02/17/22 0221   PHART 7.384   GIO0BXA 40   PO2ART 57*   ONL2UWT 24.1   BEART -1   A0XQYHBA 89*   NSF5GCD 25     O2 Device: Nasal cannula  O2 Flow Rate (L/min): 2 L/min  No results found for: [address]    Radiology  No results found. Assessment and  plan:     1. Recurrent fall with near syncope  2. Mild asthma/COPD exacerbation  3. GERD  4. History of DVT/PE on anticoagulation  5. Hyperlipidemia    She is on on nebulizer with DuoNeb every 4 hours as needed continue same. Continue prednisone 40 mg daily for 5 days. She is on albuterol HFA as needed basis at home. Arrange for nebulizer for as needed use when discharged. Consider PFT as outpatient at later date. She had ABG done shows pH 7.38 PCO2 40 PO2 57 saturation 89 bicarb 24.1. Home O2 eval prior to discharge. Radiology is consulted regarding syncope. Will follow. Thank you for consult    NOTE: This report was transcribed using voice recognition software. Every effort was made to ensure accuracy; however, inadvertent computerized transcription errors may be present.     Electronically signed by Landy Banks MD, [facility] on 2/17/2022 at 8:54 AM

## 2022-02-17 NOTE — PROGRESS NOTES
Nutrition Assessment     Type and Reason for Visit: Positive Nutrition Screen (+ malnutrition screeen)    Nutrition Recommendations/Plan: diet liberalized to General    Nutrition Assessment:  No nutrition risk identified, per pt, she has lost ~40# over the last 3 years, mostly intentional, some mild weight loss due to illness, had poor appetite today due to ' diet ' food, pt has no hx of DM ( gluc < 110)    Malnutrition Assessment:  Malnutrition Status: No malnutrition    Current Nutrition Therapies:    ADULT DIET;  Regular    Anthropometric Measures:  · Height: 5' 5\" (165.1 cm)  · Current Body Wt: 213 lb (96.6 kg)   · BMI: 35.4    Nutrition Diagnosis:   No nutrition diagnosis at this time     Nutrition Interventions:   Food and/or Nutrient Delivery:  Modify Current Diet  Nutrition Education/Counseling:  No recommendation at this time   Coordination of Nutrition Care:  No recommendation at this time    Goals:    po >75%      Nutrition Monitoring and Evaluation:   Behavioral-Environmental Outcomes:  None Identified     Physical Signs/Symptoms Outcomes:  None Identified     Discharge Planning:    No discharge needs at this time     Electronically signed by Florencio Aguilar RD, LD on 2/17/22 at 12:45 PM EST

## 2022-02-18 ENCOUNTER — APPOINTMENT (OUTPATIENT)
Dept: NUCLEAR MEDICINE | Age: 57
End: 2022-02-18
Payer: MEDICARE

## 2022-02-18 LAB
ADENOVIRUS, DFA: NORMAL
ALBUMIN SERPL-MCNC: 3.4 G/DL (ref 3.5–4.6)
ALP BLD-CCNC: 98 U/L (ref 40–130)
ALT SERPL-CCNC: 19 U/L (ref 0–33)
ANION GAP SERPL CALCULATED.3IONS-SCNC: 10 MEQ/L (ref 9–15)
AST SERPL-CCNC: 18 U/L (ref 0–35)
BASOPHILS ABSOLUTE: 0 K/UL (ref 0–0.2)
BASOPHILS RELATIVE PERCENT: 0.3 %
BILIRUB SERPL-MCNC: <0.2 MG/DL (ref 0.2–0.7)
BILIRUBIN DIRECT: <0.2 MG/DL (ref 0–0.4)
BILIRUBIN, INDIRECT: NORMAL MG/DL (ref 0–0.6)
BUN BLDV-MCNC: 13 MG/DL (ref 6–20)
C-REACTIVE PROTEIN, HIGH SENSITIVITY: 6.8 MG/L (ref 0–5)
CALCIUM SERPL-MCNC: 8.3 MG/DL (ref 8.5–9.9)
CHLORIDE BLD-SCNC: 110 MEQ/L (ref 95–107)
CO2: 24 MEQ/L (ref 20–31)
CREAT SERPL-MCNC: 0.74 MG/DL (ref 0.5–0.9)
EKG ATRIAL RATE: 75 BPM
EKG P AXIS: 65 DEGREES
EKG P-R INTERVAL: 218 MS
EKG Q-T INTERVAL: 394 MS
EKG QRS DURATION: 84 MS
EKG QTC CALCULATION (BAZETT): 439 MS
EKG R AXIS: 18 DEGREES
EKG T AXIS: 41 DEGREES
EKG VENTRICULAR RATE: 75 BPM
EOSINOPHILS ABSOLUTE: 0 K/UL (ref 0–0.7)
EOSINOPHILS RELATIVE PERCENT: 0 %
GFR AFRICAN AMERICAN: >60
GFR NON-AFRICAN AMERICAN: >60
GLOBULIN: 2.3 G/DL (ref 2.3–3.5)
GLUCOSE BLD-MCNC: 99 MG/DL (ref 70–99)
HCT VFR BLD CALC: 36.3 % (ref 37–47)
HEMOGLOBIN: 12.2 G/DL (ref 12–16)
INFLUENZA A,DFA: NORMAL
INFLUENZA B,DFA: NORMAL
LYMPHOCYTES ABSOLUTE: 1.7 K/UL (ref 1–4.8)
LYMPHOCYTES RELATIVE PERCENT: 30 %
MAGNESIUM: 2.3 MG/DL (ref 1.7–2.4)
MCH RBC QN AUTO: 31.5 PG (ref 27–31.3)
MCHC RBC AUTO-ENTMCNC: 33.6 % (ref 33–37)
MCV RBC AUTO: 93.7 FL (ref 82–100)
METAPNEUMOVIRUS, DFA: NORMAL
MONOCYTES ABSOLUTE: 0.4 K/UL (ref 0.2–0.8)
MONOCYTES RELATIVE PERCENT: 7.1 %
NEUTROPHILS ABSOLUTE: 3.5 K/UL (ref 1.4–6.5)
NEUTROPHILS RELATIVE PERCENT: 62.6 %
PARAINFLUENZA 1 DFA STAIN: NORMAL
PARAINFLUENZA 2 DFA STAIN: NORMAL
PARAINFLUENZA 3: NORMAL
PDW BLD-RTO: 16.6 % (ref 11.5–14.5)
PLATELET # BLD: 255 K/UL (ref 130–400)
POTASSIUM REFLEX MAGNESIUM: 3.7 MEQ/L (ref 3.4–4.9)
POTASSIUM SERPL-SCNC: 3.7 MEQ/L (ref 3.4–4.9)
RBC # BLD: 3.87 M/UL (ref 4.2–5.4)
RESPIRATORY SYNCYTIAL VIRUS  (RSV) DFA: NORMAL
RSPFA SOURCE: NORMAL
SEDIMENTATION RATE, ERYTHROCYTE: 18 MM (ref 0–30)
SODIUM BLD-SCNC: 144 MEQ/L (ref 135–144)
TOTAL PROTEIN: 5.7 G/DL (ref 6.3–8)
TROPONIN: <0.01 NG/ML (ref 0–0.01)
WBC # BLD: 5.6 K/UL (ref 4.8–10.8)

## 2022-02-18 PROCEDURE — 94640 AIRWAY INHALATION TREATMENT: CPT

## 2022-02-18 PROCEDURE — 2700000000 HC OXYGEN THERAPY PER DAY

## 2022-02-18 PROCEDURE — G0378 HOSPITAL OBSERVATION PER HR: HCPCS

## 2022-02-18 PROCEDURE — 84484 ASSAY OF TROPONIN QUANT: CPT

## 2022-02-18 PROCEDURE — 83735 ASSAY OF MAGNESIUM: CPT

## 2022-02-18 PROCEDURE — 3430000000 HC RX DIAGNOSTIC RADIOPHARMACEUTICAL: Performed by: INTERNAL MEDICINE

## 2022-02-18 PROCEDURE — 94761 N-INVAS EAR/PLS OXIMETRY MLT: CPT

## 2022-02-18 PROCEDURE — 36415 COLL VENOUS BLD VENIPUNCTURE: CPT

## 2022-02-18 PROCEDURE — 2580000003 HC RX 258: Performed by: INTERNAL MEDICINE

## 2022-02-18 PROCEDURE — 82248 BILIRUBIN DIRECT: CPT

## 2022-02-18 PROCEDURE — 6370000000 HC RX 637 (ALT 250 FOR IP): Performed by: INTERNAL MEDICINE

## 2022-02-18 PROCEDURE — 85025 COMPLETE CBC W/AUTO DIFF WBC: CPT

## 2022-02-18 PROCEDURE — 80053 COMPREHEN METABOLIC PANEL: CPT

## 2022-02-18 PROCEDURE — 6360000002 HC RX W HCPCS: Performed by: INTERNAL MEDICINE

## 2022-02-18 PROCEDURE — A9502 TC99M TETROFOSMIN: HCPCS | Performed by: INTERNAL MEDICINE

## 2022-02-18 PROCEDURE — 97162 PT EVAL MOD COMPLEX 30 MIN: CPT

## 2022-02-18 PROCEDURE — 93017 CV STRESS TEST TRACING ONLY: CPT

## 2022-02-18 PROCEDURE — 85652 RBC SED RATE AUTOMATED: CPT

## 2022-02-18 PROCEDURE — 86141 C-REACTIVE PROTEIN HS: CPT

## 2022-02-18 PROCEDURE — 78452 HT MUSCLE IMAGE SPECT MULT: CPT

## 2022-02-18 PROCEDURE — 99233 SBSQ HOSP IP/OBS HIGH 50: CPT | Performed by: INTERNAL MEDICINE

## 2022-02-18 RX ORDER — SODIUM CHLORIDE 0.9 % (FLUSH) 0.9 %
10 SYRINGE (ML) INJECTION PRN
Status: COMPLETED | OUTPATIENT
Start: 2022-02-18 | End: 2022-02-18

## 2022-02-18 RX ORDER — BUDESONIDE AND FORMOTEROL FUMARATE DIHYDRATE 80; 4.5 UG/1; UG/1
2 AEROSOL RESPIRATORY (INHALATION) 2 TIMES DAILY
Qty: 10.2 G | Refills: 3 | Status: SHIPPED | OUTPATIENT
Start: 2022-02-18 | End: 2022-03-01 | Stop reason: ALTCHOICE

## 2022-02-18 RX ORDER — BUDESONIDE AND FORMOTEROL FUMARATE DIHYDRATE 80; 4.5 UG/1; UG/1
2 AEROSOL RESPIRATORY (INHALATION) 2 TIMES DAILY
Status: DISCONTINUED | OUTPATIENT
Start: 2022-02-18 | End: 2022-02-19 | Stop reason: HOSPADM

## 2022-02-18 RX ORDER — SODIUM CHLORIDE 9 MG/ML
INJECTION, SOLUTION INTRAVENOUS
Status: DISPENSED
Start: 2022-02-18 | End: 2022-02-18

## 2022-02-18 RX ORDER — SERTRALINE HYDROCHLORIDE 100 MG/1
100 TABLET, FILM COATED ORAL DAILY
Qty: 30 TABLET | Refills: 3 | Status: SHIPPED | OUTPATIENT
Start: 2022-02-19 | End: 2022-09-13

## 2022-02-18 RX ORDER — PREDNISONE 10 MG/1
TABLET ORAL
Qty: 30 TABLET | Refills: 0 | Status: SHIPPED | OUTPATIENT
Start: 2022-02-18 | End: 2022-03-01 | Stop reason: ALTCHOICE

## 2022-02-18 RX ORDER — BUDESONIDE AND FORMOTEROL FUMARATE DIHYDRATE 80; 4.5 UG/1; UG/1
AEROSOL RESPIRATORY (INHALATION)
Status: DISCONTINUED
Start: 2022-02-18 | End: 2022-02-18 | Stop reason: WASHOUT

## 2022-02-18 RX ADMIN — BUDESONIDE AND FORMOTEROL FUMARATE DIHYDRATE 2 PUFF: 80; 4.5 AEROSOL RESPIRATORY (INHALATION) at 19:20

## 2022-02-18 RX ADMIN — BUDESONIDE AND FORMOTEROL FUMARATE DIHYDRATE 2 PUFF: 80; 4.5 AEROSOL RESPIRATORY (INHALATION) at 12:29

## 2022-02-18 RX ADMIN — TETROFOSMIN 11.8 MILLICURIE: 1.38 INJECTION, POWDER, LYOPHILIZED, FOR SOLUTION INTRAVENOUS at 08:39

## 2022-02-18 RX ADMIN — ROSUVASTATIN CALCIUM 40 MG: 40 TABLET, FILM COATED ORAL at 18:23

## 2022-02-18 RX ADMIN — TETROFOSMIN 33.1 MILLICURIE: 1.38 INJECTION, POWDER, LYOPHILIZED, FOR SOLUTION INTRAVENOUS at 09:46

## 2022-02-18 RX ADMIN — SODIUM CHLORIDE, PRESERVATIVE FREE 10 ML: 5 INJECTION INTRAVENOUS at 09:47

## 2022-02-18 RX ADMIN — PREDNISONE 40 MG: 20 TABLET ORAL at 07:23

## 2022-02-18 RX ADMIN — SERTRALINE 100 MG: 100 TABLET, FILM COATED ORAL at 07:23

## 2022-02-18 RX ADMIN — SODIUM CHLORIDE, PRESERVATIVE FREE 10 ML: 5 INJECTION INTRAVENOUS at 09:46

## 2022-02-18 RX ADMIN — LEVOTHYROXINE SODIUM 75 MCG: 0.03 TABLET ORAL at 06:07

## 2022-02-18 RX ADMIN — REGADENOSON 0.4 MG: 0.08 INJECTION, SOLUTION INTRAVENOUS at 09:46

## 2022-02-18 RX ADMIN — APIXABAN 5 MG: 5 TABLET, FILM COATED ORAL at 21:23

## 2022-02-18 RX ADMIN — TOPIRAMATE 125 MG: 100 TABLET, FILM COATED ORAL at 18:27

## 2022-02-18 RX ADMIN — NYSTATIN 500000 UNITS: 100000 SUSPENSION ORAL at 07:23

## 2022-02-18 RX ADMIN — SODIUM CHLORIDE, PRESERVATIVE FREE 10 ML: 5 INJECTION INTRAVENOUS at 08:40

## 2022-02-18 RX ADMIN — APIXABAN 5 MG: 5 TABLET, FILM COATED ORAL at 07:23

## 2022-02-18 RX ADMIN — Medication 650 MG: at 13:22

## 2022-02-18 RX ADMIN — SODIUM CHLORIDE, PRESERVATIVE FREE 10 ML: 5 INJECTION INTRAVENOUS at 07:24

## 2022-02-18 ASSESSMENT — PAIN SCALES - GENERAL: PAINLEVEL_OUTOF10: 10
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
## 2022-02-18 NOTE — PROGRESS NOTES
Physical Therapy Missed Treatment   Facility/Department: [facility] MED SURG C606/[id number]    NAME: Jo Calabrese    : 1965 (59 y.o.)  MRN: [id number]    Account: [de-identified]  Gender: female      PT evaluation and treatment orders received. Chart reviewed. Pt off floor at this time. Nursing staff notified. Will follow and attempt PT evaluation again at earliest availability.        Fahad Navarro, PT, 22 at 11:25 AM
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
## 2022-02-18 NOTE — PROCEDURES
[address]                      [address], [address] [facility]                              CARDIAC STRESS TEST    PATIENT NAME: Ariadna Izaguirre              :        1965  MED REC NO:   [id number]                            ROOM:       [id number]  ACCOUNT NO:   [de-identified]                           ADMIT DATE: 2022  PROVIDER:     Chuck Dorsey MD    CARDIOVASCULAR DIAGNOSTIC DEPARTMENT    DATE OF STUDY:  2022      LEXISCAN MYOVIEW STRESS TEST    INDICATION:  Chest pain, abnormal ECG. TECHNIQUE RESULTS:  Standard Lexiscan Myoview cardiac fusion stress test  protocol was followed. Rest and stress tomographic images were  obtained. Left ventricular ejection fraction and gated wall motion were  acquired. Resting heart rate and blood pressure were 90 beats per minute and  122/68 respectively. Resting electrocardiogram revealed sinus rhythm of  low voltage, poor R-wave anterior progression. The patient had an  adequate vasodilatory response to [place]. There are no significant  ischemic EKG changes or dysrhythmias. This is a normal recovery phase. Next, rest and stress tomographic images were reviewed and revealed  normal perfusion. There is no evidence of myocardial ischemia,  myocardial function, or left ventricular dilatation with stress. Overall, left ventricular systolic function appeared to be normal  without obvious focal wall motion abnormalities. Left ventricular  ejection fraction was 79%. Image quality was good. IMPRESSION:  1. Negative Lexiscan Myoview cardiac perfusion stress test for  significant coronary disease. 2.  No evidence of myocardial ischemia or myocardial infarction by  perfusion imaging. 3.  Normal left ventricular systolic function and left ventricular  ejection fraction of 79%. 4.  Abnormal resting electrocardiogram is noted. 5.  Clinical correlation is advised.         Opal Mathis MD    D:

## 2022-02-18 NOTE — CARE COORDINATION
PER PHYSICAL THERAPY PATIENT CAN NOT GO HOME AS HER MIGRAINE AND DIZZINESS, STATES SHOULD BE OK WITH [facility] ONCE MIGRAINE CONTROLLED. CALL TO MD AND HE WILL R/O HER TOPAMAX AND WILL MONITOR PATIENT. IF BETTER CAN DC TONIGHT. SPOKE WITH PATIENT AND SHE WILL BE STAYING WITH HER NIECE UNTIL SOMETIME IN [facility]. WOULD LIKE [facility] AND PCP WHILE HERE. FOC GIVEN AND CHOSE [facility].  WILL NEED [address]RE[address]S ADDRESS AND WILL BE HERE AT 4PM

## 2022-02-18 NOTE — PROGRESS NOTES
Physical Therapy Med Surg Initial Assessment  Facility/Department: [facility]  Room: F625/B457-64       NAME: Otoniel Bernal  : 1965 (59 y.o.)  MRN: [id number]  CODE STATUS: Full Code    Date of Service: 2022    Patient Diagnosis(es): Weakness [R53.1]  Closed head injury, initial encounter [L04.46JZ]  Fall, initial encounter [W19. XXXA]   Chief Complaint   Patient presents with    Fall     Patient Active Problem List    Diagnosis Date Noted    Right knee pain 2016    Chronic obstructive pulmonary disease ([facility] [address] 2016    Major depressive disorder, recurrent, severe without psychotic features ([facility] [address] 2016    Benign tumor of adrenal gland 04/10/2014    Hypothyroid 10/09/2012    Hyperlipidemia     GERD (gastroesophageal reflux disease)     History of craniotomy     Lipoma of back 2014    Chronic back pain 2016    Pedal edema 2016    History of fracture of femur 2014    Alkaline phosphatase elevation 2013    Anemia 2013    Chronic pain         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    24 hour urine studies and aldosterone test negative     Chronic pain     Deep venous thrombosis ([facility] [address] 2011    Depression     Essential hypertension     Fibromyalgia     GERD (gastroesophageal reflux disease)     Heat intolerance     Hip pain     History of craniotomy     [place]    Hyperlipidemia 2009    IBS (irritable bowel syndrome)     Meralgia paraesthetica     Migraine headache     Obesity     Osteopenia     Pedal edema     Sleep apnea     Umbilical hernia     Uterine fibroid      Past Surgical History:   Procedure Laterality Date     SECTION      x3    COLONOSCOPY  2013    normal    CRANIOTOMY  2007    [facility]    FIBULA FRACTURE SURGERY  2009    ORIF right tibia and fibula     ORIF FEMUR DECOMPRESSION      right    PATELLA SURGERY  2007    partial resection right patella     TONSILLECTOMY      UPPER GASTROINTESTINAL ENDOSCOPY  4/2013       Chart Reviewed: Yes  Family / Caregiver Present: No  General Comment  Comments: Pt resting in bed - agreeable to PT evaluation    Restrictions:  Restrictions/Precautions: Fall Risk     SUBJECTIVE: Subjective: \"I wish they would give me my migraine medication. \"    Pain  Pre Treatment Pain Screening  Pain at present: 10  Scale Used: Numeric Score  Intervention List: Patient able to continue with treatment;Nurse/physician notified; Patient declined any intervention  Comments / Details: headache; all over body    Post Treatment Pain Screening:   Pain Assessment  Pain Assessment:  (unchanged)    Prior Level of Function:  Social/Functional History  Lives With: Family (is visiting from out of state)  Type of Home: House  Home Layout: Two level (stays on second floor - full flight up)  Home Access: Stairs to enter with rails  Entrance Stairs - Number of Steps: 3  Home Equipment: Cane,Rolling walker (rollator)  ADL Assistance: Independent  Ambulation Assistance: Independent (with [address])  Transfer Assistance: Independent  Additional Comments: Pt states that she us usually very limited by her chronic migraines. On days when her migraines are out of control, she normally rests in bed and only gets up for foot and restroom breaks. Otherwise, pt reports complete indep. OBJECTIVE:   Vision: Within Functional Limits  Hearing: Within functional limits    Cognition:  Overall Orientation Status: Within Functional Limits  Follows Commands: Within Functional Limits    Observation/Palpation  Observation: No acute distress noted. Pt pleasant and motivated. Distracted frequently.     ROM:  RLE PROM: WFL  LLE PROM: WFL    Strength:  Strength RLE  Strength RLE: WFL  Strength LLE  Strength LLE: WFL    Neuro:  Balance  Sitting - Static: Good  Sitting - Dynamic: Good  Standing - Static: Fair;+  Standing - Dynamic: Fair     Motor Control  Gross Motor?: WFL  Sensation  Overall Sensation Status: WFL    Bed mobility  Rolling to Right: Supervision  Supine to Sit: Supervision  Sit to Supine: Supervision    Transfers  Sit to Stand: Stand by assistance  Stand to sit: Stand by assistance  Bed to Chair: Stand by assistance;Contact guard assistance    Ambulation  Ambulation?: Yes  Ambulation 1  Surface: level tile  Device: No Device  Assistance: Stand by assistance;Contact guard assistance  Quality of Gait: Increased lateral excursion. Pt with increased dizziness during ambulation requiring close guarding and increased time to complete. Distance: 20ft with turn    Stairs/Curb  Stairs?: No         Activity Tolerance  Activity Tolerance: Patient Tolerated treatment well          PT Education  PT Education: Goals;PT Role;Plan of Care    ASSESSMENT:   Body structures, Functions, Activity limitations: Decreased functional mobility ; Decreased safe awareness;Decreased balance;Decreased ADL status; Decreased strength;Decreased endurance;Decreased coordination;Decreased sensation  Decision Making: Medium Complexity  History: High  Exam: High  Clinical Presentation: High    Prognosis: Good  Barriers to Learning: dizzines    DISCHARGE RECOMMENDATIONS:  Discharge Recommendations: Continue to assess pending progress,Patient would benefit from continued therapy after discharge    Assessment: pt limited by pain and dizziness this PM. Pt has steps at home however unsafe at this time to complete. Rec walker for ambulation. Pt states that she would feel better walking if she had her migraine medication - discussed this with RN. Continued pT indicated to progress mobility and facilitaate DC at highest level of indep and safety.   REQUIRES PT FOLLOW UP: Yes      PLAN OF CARE:  Plan  Times per week: 3-6  Current Treatment Recommendations: Fiorella Ruizer Mobility Training,Transfer Training,ADL/Self-care Paula Nguyen Re-education,Patient/Caregiver Education & Training,Equipment Evaluation, Education, & procurement,Home Exercise Program,Safety Education & Training,Stair training,Gait Training  Safety Devices  Type of devices: All fall risk precautions in place    Goals:  Long term goals  Long term goal 1: Pt to complete bed mobility wtih indep  Long term goal 2: pt o complete transfers with indep  Long term goal 3: Pt to ambulate 50-150ft with LRD and indep  Long term goal 4: Pt to manage flight of steps with HR and indep    AMPAC (6 CLICK) BASIC MOBILITY  AM-PAC Inpatient Mobility Raw Score : 17     Therapy Time:   Individual   Time In 1453   Time Out 1511   Minutes [address] [place] [address], 02/18/22 at 4:57 PM         Definitions for assistance levels  Independent = pt does not require any physical supervision or assistance from another person for activity completion. Device may be needed.   Stand by assistance = pt requires verbal cues or instructions from another person, close to but not touching, to perform the activity  Minimal assistance= pt performs 75% or more of the activity; assistance is required to complete the activity  Moderate assistance= pt performs 50% of the activity; assistance is required to complete the activity  Maximal assistance = pt performs 25% of the activity; assistance is required to complete the activity  Dependent = pt requires total physical assistance to accomplish the task

## 2022-02-18 NOTE — PROGRESS NOTES
Pt AM assessment completed. Pt assisted to BR, slow steady gait noted. No complaints of lightheadedness, no dizziness reported by the pt. Pt off the unit for stress test at this time.

## 2022-02-18 NOTE — CARE COORDINATION
[facility] CAN NOT TAKE D/T INSURANCE. CALL TO [facility] AND SPOKE WITH JAIR SKELTON ADDRESS CHANGE FOR NIECE IS [address] IS SAME [phone].  SHE WILL RUN INSURANCE AND LET ME KNOW

## 2022-02-18 NOTE — PROGRESS NOTES
INPATIENT PROGRESS NOTES    PATIENT NAME: Anabella Laird  MRN: [id number]  SERVICE DATE:  February 18, 2022   SERVICE TIME:  12:16 PM      PRIMARY SERVICE: Pulmonary Disease    CHIEF COMPLAIN: Asthma COPD exacerbation    INTERVAL HPI: Patient seen and examined at bedside, Interval Notes, orders reviewed. Nursing notes noted  Patient is on 2 L O2 via nasal cannula O2 saturation 98%. She denies having short of breath at rest.  No chest pain. No cough or sputum production. Wheezing improved. She had nuclear stress test today. Report pending. Discussed with Dr. Kimberley Edmondson. Patient could be discharged when okay by cardiology. He needs to arrange for nebulizer at home, home O2 evaluation. We will also add ICS/LABA when discharged. OBJECTIVE    Body mass index is 33.61 kg/m². PHYSICAL EXAM:  Vitals:  BP (!) 120/59   Pulse 89   Temp 98.1 °F (36.7 °C)   Resp 17   Ht 5' 5\" (1.651 m)   Wt 202 lb (91.6 kg)   LMP 07/30/2014   SpO2 98%   BMI 33.61 kg/m²   General: Alert, awake . comfortable in bed, No distress. Head: Atraumatic , Normocephalic   Eyes: PERRL. No sclera icterus. No conjunctival injection. No discharge   ENT: No nasal  discharge. Pharynx clear. Neck:  Trachea midline. No thyromegaly, no JVD, No cervical adenopathy. Chest : Bilaterally symmetrical ,Normal effort,  No accessory muscle use  Lung : . Fair BS bilateral, decreased BS at bases. No Rales. No wheezing. No rhonchi. Heart[de-identified] Normal  rate. Regular rhythm. No mumur ,  Rub or gallop  ABD: Non-tender. Non-distended. No masses. No organmegaly. Normal bowel sounds. No hernia.   Ext : No Pitting both leg , No Cyanosis No clubbing  Neuro: no focal weakness          DATA:   Recent Labs     02/17/22  1034 02/18/22  0525   WBC 3.9* 5.6   HGB 12.4 12.2   HCT 37.2 36.3*   MCV 94.9 93.7    255     Recent Labs     02/16/22  2230 02/17/22  0221 02/17/22  0602 02/17/22  0602 02/18/22  0525      < > 145*  --  144   K 3.1*  --  3.5   < > 3.7  3.7      < > 108*  --  110*   CO2 26   < > 25  --  24   BUN 17   < > 14  --  13   CREATININE 1.51*   < > 1.09*  --  0.74   GLUCOSE 85   < > 110*  --  99   CALCIUM 8.8   < > 8.1*  --  8.3*   PROT 7.2  --   --   --  5.7*   LABALBU 4.4  --   --   --  3.4*   BILITOT <0.2  --   --   --  <0.2   ALKPHOS 119  --   --   --  98   AST 26  --   --   --  18   ALT 30   < >  --   --  19   LABGLOM 35.3*   < > 51.4*   < > >60.0   GFRAA 42.7*   < > >60.0   < > >60.0   GLOB 2.8   < >  --   --  2.3    < > = values in this interval not displayed. MV Settings:          Recent Labs     02/17/22  0221   PHART 7.384   VTV7ELH 40   PO2ART 57*   BCL7CCB 24.1   BEART -1   L7KMIRTK 89*       O2 Device: Nasal cannula  O2 Flow Rate (L/min): 2 L/min    ADULT DIET; Regular     MEDICATIONS during current hospitalization:    Continuous Infusions:   sodium chloride      lactated ringers 100 mL/hr at 02/17/22 1837       Scheduled Meds:   predniSONE  40 mg Oral Daily    nystatin  5 mL Oral 4x Daily    sertraline  100 mg Oral Daily    rosuvastatin  40 mg Oral QPM    levothyroxine  75 mcg Oral Daily    fluticasone  1 spray Nasal Daily    apixaban  5 mg Oral BID    sodium chloride flush  10 mL IntraVENous BID       PRN Meds:ondansetron **OR** ondansetron, polyethylene glycol, acetaminophen **OR** acetaminophen, ipratropium-albuterol    Radiology  XR CHEST (2 VW)    Result Date: 2/17/2022  TECHNIQUE: 2 views of the chest were obtained. CLINICAL INDICATION: Trauma. COMPARISON: None. PROCEDURE AND FINDINGS: Poor inspiratory effort limits evaluation. The cardiomediastinal silhouette is slightly prominent. Mild prominence of the bronchovascular and interstitial lung markings is seen but no evidence of parenchymal contusion or pneumothorax. The costophrenic angles are clear, no evidence of lung infiltrate, pleural effusion or parenchymal lung mass. The bony thorax unremarkable for the patient's age.      No evidence of traumatic chest pathology. .    CT HEAD WO CONTRAST    Result Date: 2/17/2022  INDICATION: Trauma. COMPARISON: None. . TECHNIQUE: Multidetector CT imaging was obtained from the skull base to vertex without the administration of intravenous contrast. Coronal and sagittal reformatted images were obtained. Automated dose exposure control was used for this exam. FINDINGS: Suboccipital postoperative changes visualized. Right preseptal/inferior conjunctival soft tissue stranding visualized, the right globe is unremarkable, no evidence of postseptal stranding. No stranding of the intraconal fat planes is seen. The left orbit is unremarkable. Visualized paranasal sinuses are well aerated. Visualized mastoid air cells are well aerated. The calvarium is intact. Ventricles and sulci normal in size and configuration. No extra-axial collection. No acute intracranial hemorrhage. No cerebral edema or mass effect. No CT evidence of acute, large territorial infarction. No acute intracranial pathology is seen     CTA CHEST W WO CONTRAST    Result Date: 2/17/2022  CT PULMONARY ANGIOGRAM WITH INTRAVENOUS CONTRAST MEDIUM. REASON FOR EXAMINATION:  CHEST PAIN. SYNCOPE YESTERDAY. BACK PAIN TECHNIQUE: Helical CTA was performed through the chest utilizing 100 ml of Isovue-370 intravenous contrast.  Images were obtained with bolus tracking in order to opacify the pulmonary arteries. Thick section coronal MIP 3D reconstructions were performed  on a separate workstation. COMPARISON: Chest radiograph, February 16, 2022 FINDINGS:  Pulmonary arteries: No intraluminal filling defects. Thoracic aorta: Normal in course and caliber. Cardiac Size: Normal. Pericardial effusion: 3 mm nondependent fusion along the anterior pericardium. Right lung: No nodules, masses, consolidation, pleural effusion, pneumothorax. While thickening major fissure. Left lung: No nodules, masses, consolidation, pleural effusion, pneumothorax.  Lymph nodes: No hilar, mediastinal, or axillary lymph node enlargement. Upper abdomen:Limited imaging upper abdomen shows noncalcified 2.0 x 2.2 cm left adrenal nodule, with Hounsfield measurement 14-19. Musculoskeletal:No osteoblastic, and no osteolytic lesions. No CT evidence pulmonary embolism. Small pericardial effusion. Left adrenal adenoma. All CT scans at this facility use dose modulation, iterative reconstruction, and/or weight based dosing when appropriate to reduce radiation dose to as low as reasonably achievable. US CAROTID ARTERY BILATERAL    Result Date: 2/18/2022  Carotid Ultrasound Examination Clinical information:  Syncope  Findings Grayscale and color Doppler ultrasound examination of the carotid and vertebral artery systems bilaterally. Maximum peak systolic velocity (PSV) / end diastolic velocity (EDV) measurements were obtained. Right Carotid System Right Common Carotid Artery (RCCA): 82  cm/s. Right Internal Carotid Artery (JOSE ANGEL): 127  cm/s. Right External Carotid Artery (RECA): 62 cm/s. Right Vertebral Artery (RVA): Antegrade flow. Right ICA/CCA ratio: 1.5. Left Carotid System Left Common Carotid Artery (LCCA): 93 cm/s. Left Internal Carotid Artery (LICA): 640 cm/s. Left External Carotid Artery (LECA): 64 cm/s. Left Vertebral Artery (LVA): Antegrade flow. Left ICA/CCA ratio: 1.1 Impression Less than 50% stenosis, bilateral internal carotid arteries. Validated velocity measurements with angiographic measurements, velocity criteria are extrapolated from diameter data as defined by the Society of Radiologist in [address] [place] [address] Radiology 2003; 978;[phone]\". US DUP LOWER EXTREMITIES BILATERAL VENOUS    Result Date: 2/18/2022  INDICATION: Bilateral lower extremity edema, rule out DVT. COMPARISON: None. TECHNIQUE: Sonographic evaluation of the deep venous system of bilateral lower extremities was performed.  Color-flow and spectral analysis of the waveform were performed as well. FINDINGS: Lower extremity veins were evaluated from the external iliac vein to the posterior tibial veins bilaterally demonstrate unremarkable anechoic internal echogenicity with no evidence of clot or thrombus, unremarkable compressible veins, unremarkable color flow imaging and unremarkable augmentation. No evidence of lower extremity deep vein thrombosis is seen. No evidence of deep venous thrombosis in bilateral lower extremities. XR HIP 2-3 VW W PELVIS RIGHT    Result Date: 2/17/2022  PROCEDURE: X-ray right hip INDICATION: Trauma COMPARISON: None FINDINGS: No evidence of cortical irregularity or lucency to suggest a fracture, no evidence of lytic or sclerotic bone lesion is seen. Unremarkable internal fixation of the right femur. Mild degenerative bone changes are seen. The overlying soft tissues are unremarkable. No evidence of acute osseous pathology. IMPRESSION AND SUGGESTION:  1. Recurrent fall with near syncope  2. Mild asthma/COPD exacerbation  3. GERD  4. History of DVT/PE on anticoagulation  5. Hyperlipidemia    She is doing better. Arrange for nebulizer with albuterol for as needed use when discharged home. Home O2 evaluation. And start Symbicort 2 puff twice daily rinse mouth after use. Discussed with Dr. Zita Reyes. Also talked to her daughter on video phone. She had nuclear stress test report pending. Continue present treatment plan      NOTE: This report was transcribed using voice recognition software. Every effort was made to ensure accuracy; however, inadvertent computerized transcription errors may be present.       Electronically signed by Ted Ivy MD, FCCP on 2/18/2022 at 12:16 PM

## 2022-02-18 NOTE — PROGRESS NOTES
Home O2 eval. Room air resting spo2 97%. Walking room air spo2 97%. Patient does not qualify for home Oxygen.  RW RRt

## 2022-02-18 NOTE — PROGRESS NOTES
Reviewed history, allergies, and medications. See Mar.  Consent confirmed. Lexiscan exam explained. Placed patient on monitor. @ [address] [place] [address] here to inject George Mandril. SOB noted during recovery phase. Denied chest pain. No ectopy noted. Patient off monitor and instructed to eat, will have last part of exam in 1 hour.

## 2022-02-19 VITALS
SYSTOLIC BLOOD PRESSURE: 126 MMHG | HEART RATE: 71 BPM | HEIGHT: 65 IN | WEIGHT: 202 LBS | DIASTOLIC BLOOD PRESSURE: 74 MMHG | RESPIRATION RATE: 18 BRPM | TEMPERATURE: 99 F | BODY MASS INDEX: 33.66 KG/M2 | OXYGEN SATURATION: 96 %

## 2022-02-19 LAB
ANION GAP SERPL CALCULATED.3IONS-SCNC: 10 MEQ/L (ref 9–15)
BASOPHILS ABSOLUTE: 0 K/UL (ref 0–0.2)
BASOPHILS RELATIVE PERCENT: 0.2 %
BUN BLDV-MCNC: 14 MG/DL (ref 6–20)
CALCIUM SERPL-MCNC: 8.3 MG/DL (ref 8.5–9.9)
CHLORIDE BLD-SCNC: 112 MEQ/L (ref 95–107)
CO2: 22 MEQ/L (ref 20–31)
CREAT SERPL-MCNC: 0.75 MG/DL (ref 0.5–0.9)
EOSINOPHILS ABSOLUTE: 0 K/UL (ref 0–0.7)
EOSINOPHILS RELATIVE PERCENT: 0.1 %
GFR AFRICAN AMERICAN: >60
GFR NON-AFRICAN AMERICAN: >60
GLUCOSE BLD-MCNC: 89 MG/DL (ref 70–99)
HCT VFR BLD CALC: 35.1 % (ref 37–47)
HEMOGLOBIN: 11.9 G/DL (ref 12–16)
LYMPHOCYTES ABSOLUTE: 2.7 K/UL (ref 1–4.8)
LYMPHOCYTES RELATIVE PERCENT: 43.1 %
MAGNESIUM: 2.1 MG/DL (ref 1.7–2.4)
MCH RBC QN AUTO: 32.1 PG (ref 27–31.3)
MCHC RBC AUTO-ENTMCNC: 33.9 % (ref 33–37)
MCV RBC AUTO: 94.8 FL (ref 82–100)
MONOCYTES ABSOLUTE: 0.4 K/UL (ref 0.2–0.8)
MONOCYTES RELATIVE PERCENT: 6.3 %
NEUTROPHILS ABSOLUTE: 3.2 K/UL (ref 1.4–6.5)
NEUTROPHILS RELATIVE PERCENT: 50.3 %
PDW BLD-RTO: 16.4 % (ref 11.5–14.5)
PLATELET # BLD: 234 K/UL (ref 130–400)
POTASSIUM REFLEX MAGNESIUM: 3.1 MEQ/L (ref 3.4–4.9)
RBC # BLD: 3.7 M/UL (ref 4.2–5.4)
SODIUM BLD-SCNC: 144 MEQ/L (ref 135–144)
WBC # BLD: 6.3 K/UL (ref 4.8–10.8)

## 2022-02-19 PROCEDURE — 80048 BASIC METABOLIC PNL TOTAL CA: CPT

## 2022-02-19 PROCEDURE — 6370000000 HC RX 637 (ALT 250 FOR IP): Performed by: INTERNAL MEDICINE

## 2022-02-19 PROCEDURE — G0378 HOSPITAL OBSERVATION PER HR: HCPCS

## 2022-02-19 PROCEDURE — 36415 COLL VENOUS BLD VENIPUNCTURE: CPT

## 2022-02-19 PROCEDURE — 2700000000 HC OXYGEN THERAPY PER DAY

## 2022-02-19 PROCEDURE — 94761 N-INVAS EAR/PLS OXIMETRY MLT: CPT

## 2022-02-19 PROCEDURE — 85025 COMPLETE CBC W/AUTO DIFF WBC: CPT

## 2022-02-19 PROCEDURE — 94640 AIRWAY INHALATION TREATMENT: CPT

## 2022-02-19 PROCEDURE — 83735 ASSAY OF MAGNESIUM: CPT

## 2022-02-19 PROCEDURE — 99232 SBSQ HOSP IP/OBS MODERATE 35: CPT | Performed by: INTERNAL MEDICINE

## 2022-02-19 PROCEDURE — 2580000003 HC RX 258: Performed by: INTERNAL MEDICINE

## 2022-02-19 RX ORDER — POTASSIUM CHLORIDE 20 MEQ/1
40 TABLET, EXTENDED RELEASE ORAL ONCE
Status: COMPLETED | OUTPATIENT
Start: 2022-02-19 | End: 2022-02-19

## 2022-02-19 RX ADMIN — NYSTATIN 500000 UNITS: 100000 SUSPENSION ORAL at 09:29

## 2022-02-19 RX ADMIN — SODIUM CHLORIDE, PRESERVATIVE FREE 10 ML: 5 INJECTION INTRAVENOUS at 09:30

## 2022-02-19 RX ADMIN — SERTRALINE 100 MG: 100 TABLET, FILM COATED ORAL at 09:29

## 2022-02-19 RX ADMIN — TOPIRAMATE 125 MG: 100 TABLET, FILM COATED ORAL at 05:20

## 2022-02-19 RX ADMIN — BUDESONIDE AND FORMOTEROL FUMARATE DIHYDRATE 2 PUFF: 80; 4.5 AEROSOL RESPIRATORY (INHALATION) at 07:27

## 2022-02-19 RX ADMIN — LEVOTHYROXINE SODIUM 75 MCG: 0.03 TABLET ORAL at 05:20

## 2022-02-19 RX ADMIN — APIXABAN 5 MG: 5 TABLET, FILM COATED ORAL at 09:26

## 2022-02-19 RX ADMIN — POTASSIUM CHLORIDE 40 MEQ: 1500 TABLET, EXTENDED RELEASE ORAL at 12:57

## 2022-02-19 RX ADMIN — PREDNISONE 40 MG: 20 TABLET ORAL at 09:26

## 2022-02-19 NOTE — CARE COORDINATION
VM FROM [facility] AND D/T PT'S INSURANCE IS FROM [place] THERE WOULD BE NO LOCAL PROVIDER AND NO LIST COULD BE PULLED. PT WILL NEED TO CALL INSURANCE.

## 2022-02-19 NOTE — CARE COORDINATION
UPDATED PATIENT AND SHE STATES THAT SHE HAD THE INSURANCE BE LISTED FOR [place]. CALL TO [facility] AND NO ANSWER MULTIPLE TIMES. CALL TO [facility] AND THEY PAGED INTAKE. NO CALL BACK    CALL TO DTR FOR CUSTOMER SERVICE NUMBER OR CM NUMBER AND GAVE ME A NUMBER BUT WHEN I CALLED IT WAS FOR A FLEX CARD. SHE ALSO GAE ME INSURANCE REP NUMBER BUT IS NOT AVAILABLE D/T WEEKEND.      SPOKE WITH PATIENT AND DISCUSSED OP PT. SHE IS AGREEABLE AND WILL GET ORDER FROM MD

## 2022-02-19 NOTE — PROGRESS NOTES
INPATIENT PROGRESS NOTES    PATIENT NAME: Vitaliy Blue  MRN: [id number]  SERVICE DATE:  February 19, 2022   SERVICE TIME:  2:59 PM      PRIMARY SERVICE: Pulmonary Disease    CHIEF COMPLAIN: Asthma COPD exacerbation    INTERVAL HPI: Patient seen and examined at bedside, Interval Notes, orders reviewed. Nursing notes noted  Patient is on 2 L O2 via nasal cannula O2 saturation 96%. She said she is feeling better today. She denies having short of breath at rest.  No chest pain. No cough or sputum production. Wheezing improved. she needs to arrange for nebulizer at home, home O2 evaluation. OBJECTIVE    Body mass index is 33.61 kg/m². PHYSICAL EXAM:  Vitals:  /74   Pulse 76   Temp 98.9 °F (37.2 °C) (Oral)   Resp 16   Ht 5' 5\" (1.651 m)   Wt 202 lb (91.6 kg)   LMP 07/30/2014   SpO2 96%   BMI 33.61 kg/m²   General: Alert, awake . comfortable in bed, No distress. Head: Atraumatic , Normocephalic   Eyes: PERRL. No sclera icterus. No conjunctival injection. No discharge   ENT: No nasal  discharge. Pharynx clear. Neck:  Trachea midline. No thyromegaly, no JVD, No cervical adenopathy. Chest : Bilaterally symmetrical ,Normal effort,  No accessory muscle use  Lung : . Fair BS bilateral, decreased BS at bases. No Rales. No wheezing. No rhonchi. Heart[de-identified] Normal  rate. Regular rhythm. No mumur ,  Rub or gallop  ABD: Non-tender. Non-distended. No masses. No organmegaly. Normal bowel sounds. No hernia.   Ext : No Pitting both leg , No Cyanosis No clubbing  Neuro: no focal weakness          DATA:   Recent Labs     02/18/22  0525 02/19/22  0454   WBC 5.6 6.3   HGB 12.2 11.9*   HCT 36.3* 35.1*   MCV 93.7 94.8    234     Recent Labs     02/16/22  2230 02/17/22  0221 02/18/22  0525 02/18/22  0525 02/19/22  0454      < > 144  --  144   K 3.1*   < > 3.7  3.7  --  3.1*      < > 110*  --  112*   CO2 26   < > 24  --  22   BUN 17   < > 13  --  14   CREATININE 1.51*   < > 0.74  --  0.75 GLUCOSE 85   < > 99  --  89   CALCIUM 8.8   < > 8.3*  --  8.3*   PROT 7.2  --  5.7*  --   --    LABALBU 4.4  --  3.4*  --   --    BILITOT <0.2  --  <0.2  --   --    ALKPHOS 119  --  98  --   --    AST 26  --  18  --   --    ALT 30   < > 19  --   --    LABGLOM 35.3*   < > >60.0   < > >60.0   GFRAA 42.7*   < > >60.0   < > >60.0   GLOB 2.8   < > 2.3  --   --     < > = values in this interval not displayed. MV Settings:          Recent Labs     02/17/22  0221   PHART 7.384   AXU1TAV 40   PO2ART 57*   AHC0TAG 24.1   BEART -1   T8CMIUOW 89*       O2 Device: None (Room air)  O2 Flow Rate (L/min): 2 L/min    ADULT DIET; Regular     MEDICATIONS during current hospitalization:    Continuous Infusions:   lactated ringers Stopped (02/19/22 0000)       Scheduled Meds:   budesonide-formoterol  2 puff Inhalation BID    topiramate  125 mg Oral BID    predniSONE  40 mg Oral Daily    nystatin  5 mL Oral 4x Daily    sertraline  100 mg Oral Daily    rosuvastatin  40 mg Oral QPM    levothyroxine  75 mcg Oral Daily    fluticasone  1 spray Nasal Daily    apixaban  5 mg Oral BID    sodium chloride flush  10 mL IntraVENous BID       PRN Meds:ondansetron **OR** ondansetron, polyethylene glycol, acetaminophen **OR** acetaminophen, ipratropium-albuterol    Radiology  XR CHEST (2 VW)    Result Date: 2/17/2022  TECHNIQUE: 2 views of the chest were obtained. CLINICAL INDICATION: Trauma. COMPARISON: None. PROCEDURE AND FINDINGS: Poor inspiratory effort limits evaluation. The cardiomediastinal silhouette is slightly prominent. Mild prominence of the bronchovascular and interstitial lung markings is seen but no evidence of parenchymal contusion or pneumothorax. The costophrenic angles are clear, no evidence of lung infiltrate, pleural effusion or parenchymal lung mass. The bony thorax unremarkable for the patient's age. No evidence of traumatic chest pathology. .    CT HEAD WO CONTRAST    Result Date: 2/17/2022  INDICATION: Trauma. COMPARISON: None. . TECHNIQUE: Multidetector CT imaging was obtained from the skull base to vertex without the administration of intravenous contrast. Coronal and sagittal reformatted images were obtained. Automated dose exposure control was used for this exam. FINDINGS: Suboccipital postoperative changes visualized. Right preseptal/inferior conjunctival soft tissue stranding visualized, the right globe is unremarkable, no evidence of postseptal stranding. No stranding of the intraconal fat planes is seen. The left orbit is unremarkable. Visualized paranasal sinuses are well aerated. Visualized mastoid air cells are well aerated. The calvarium is intact. Ventricles and sulci normal in size and configuration. No extra-axial collection. No acute intracranial hemorrhage. No cerebral edema or mass effect. No CT evidence of acute, large territorial infarction. No acute intracranial pathology is seen     CTA CHEST W WO CONTRAST    Result Date: 2/17/2022  CT PULMONARY ANGIOGRAM WITH INTRAVENOUS CONTRAST MEDIUM. REASON FOR EXAMINATION:  CHEST PAIN. SYNCOPE YESTERDAY. BACK PAIN TECHNIQUE: Helical CTA was performed through the chest utilizing 100 ml of Isovue-370 intravenous contrast.  Images were obtained with bolus tracking in order to opacify the pulmonary arteries. Thick section coronal MIP 3D reconstructions were performed  on a separate workstation. COMPARISON: Chest radiograph, February 16, 2022 FINDINGS:  Pulmonary arteries: No intraluminal filling defects. Thoracic aorta: Normal in course and caliber. Cardiac Size: Normal. Pericardial effusion: 3 mm nondependent fusion along the anterior pericardium. Right lung: No nodules, masses, consolidation, pleural effusion, pneumothorax. While thickening major fissure. Left lung: No nodules, masses, consolidation, pleural effusion, pneumothorax. Lymph nodes: No hilar, mediastinal, or axillary lymph node enlargement.  Upper abdomen:Limited imaging upper abdomen shows noncalcified 2.0 x 2.2 cm left adrenal nodule, with Hounsfield measurement 14-19. Musculoskeletal:No osteoblastic, and no osteolytic lesions. No CT evidence pulmonary embolism. Small pericardial effusion. Left adrenal adenoma. All CT scans at this facility use dose modulation, iterative reconstruction, and/or weight based dosing when appropriate to reduce radiation dose to as low as reasonably achievable. US CAROTID ARTERY BILATERAL    Result Date: 2/18/2022  Carotid Ultrasound Examination Clinical information:  Syncope  Findings Grayscale and color Doppler ultrasound examination of the carotid and vertebral artery systems bilaterally. Maximum peak systolic velocity (PSV) / end diastolic velocity (EDV) measurements were obtained. Right Carotid System Right Common Carotid Artery (RCCA): 82  cm/s. Right Internal Carotid Artery (JOSE ANGEL): 127  cm/s. Right External Carotid Artery (RECA): 62 cm/s. Right Vertebral Artery (RVA): Antegrade flow. Right ICA/CCA ratio: 1.5. Left Carotid System Left Common Carotid Artery (LCCA): 93 cm/s. Left Internal Carotid Artery (LICA): 303 cm/s. Left External Carotid Artery (LECA): 64 cm/s. Left Vertebral Artery (LVA): Antegrade flow. Left ICA/CCA ratio: 1.1 Impression Less than 50% stenosis, bilateral internal carotid arteries. Validated velocity measurements with angiographic measurements, velocity criteria are extrapolated from diameter data as defined by the Society of Radiologist in [address] [place] [address] Radiology 2003; [id number]\". US DUP LOWER EXTREMITIES BILATERAL VENOUS    Result Date: 2/18/2022  INDICATION: Bilateral lower extremity edema, rule out DVT. COMPARISON: None. TECHNIQUE: Sonographic evaluation of the deep venous system of bilateral lower extremities was performed. Color-flow and spectral analysis of the waveform were performed as well.  FINDINGS: Lower extremity veins were evaluated from the external iliac vein to the posterior tibial veins bilaterally demonstrate unremarkable anechoic internal echogenicity with no evidence of clot or thrombus, unremarkable compressible veins, unremarkable color flow imaging and unremarkable augmentation. No evidence of lower extremity deep vein thrombosis is seen. No evidence of deep venous thrombosis in bilateral lower extremities. XR HIP 2-3 VW W PELVIS RIGHT    Result Date: 2/17/2022  PROCEDURE: X-ray right hip INDICATION: Trauma COMPARISON: None FINDINGS: No evidence of cortical irregularity or lucency to suggest a fracture, no evidence of lytic or sclerotic bone lesion is seen. Unremarkable internal fixation of the right femur. Mild degenerative bone changes are seen. The overlying soft tissues are unremarkable. No evidence of acute osseous pathology. IMPRESSION AND SUGGESTION:  1. Recurrent fall with near syncope  2. Mild asthma/COPD exacerbation  3. GERD  4. History of DVT/PE on anticoagulation  5. Hyperlipidemia    Arrange for nebulizer with albuterol for as needed use when discharged home. Home O2 evaluation. And start Symbicort 2 puff twice daily rinse mouth after use. Continue present treatment plan. NOTE: This report was transcribed using voice recognition software. Every effort was made to ensure accuracy; however, inadvertent computerized transcription errors may be present.       Electronically signed by Brenda Mills MD, FCCP on 2/19/2022 at 2:59 PM

## 2022-02-19 NOTE — DISCHARGE INSTR - DIET

## 2022-02-19 NOTE — FLOWSHEET NOTE
SL and tele D/C'd. Pt given discharge instructions and follow up information. Patient verbalized understanding of instructions. Pt called [facility] for transport. Pt dressing and awaiting [facility].

## 2022-02-19 NOTE — PROGRESS NOTES
Internal Medicine   Hospitalist   Progress Note    2022   3:18 PM    Name:  Jeovanny Villafuerte  MRN:    [id number]     IP Day: 0     Admit Date: 2022  9:30 PM  PCP: Darshana Da Silva PA-C    Code Status:  Full Code    Assessment and Plan: Active Problems/ diagnosis:     Dizziness/frequent falls/near syncope-multifactorial due to deconditioning, polypharmacy, history of as needed malformation status post craniotomy and possibly hypotension  Asthma exacerbation  Obesity  GERD  Hypothyroidism  Hyperlipidemia  Chronic back pain  Polypharmacy    Plan  Patient feels better today  Discharge home  Discussed with pulmonary medicine yesterday  Inhaled treatments will be resumed at home  Nebulizer machine prescribed  Discussed with RN, Jagdish Katz    DVT PPx     7 pm- 7 am, please contact on call Hospitalist for any needs     Dispo-DC home today. Subjective:      no new events. Denies dyspnea and dizziness today. No headache today.     Physical Examination:      Vitals:  /74   Pulse 71   Temp 99 °F (37.2 °C) (Oral)   Resp 18   Ht 5' 5\" (1.651 m)   Wt 202 lb (91.6 kg)   LMP 2014   SpO2 96%   BMI 33.61 kg/m²   Temp (24hrs), Av.3 °F (36.8 °C), Min:97.4 °F (36.3 °C), Max:99 °F (37.2 °C)      General appearance: alert, cooperative and no distress  Mental Status: oriented to person, place and time and normal affect  Lungs: clear to auscultation bilaterally, normal effort  Heart: regular rate and rhythm, no murmur  Abdomen: soft, nontender, nondistended, bowel sounds present, no masses  Extremities: no edema, redness, tenderness in the calves  Skin: no gross lesions, rashes    Data:     Labs:  Recent Labs     22  0525 22  0454   WBC 5.6 6.3   HGB 12.2 11.9*    234     Recent Labs     22  0525 22  0454    144   K 3.7  3.7 3.1*   * 112*   CO2 24 22   BUN 13 14   CREATININE 0.74 0.75   GLUCOSE 99 89     Recent Labs     22  05 AST 26 18   ALT 30 19   BILITOT <0.2 <0.2   ALKPHOS 119 98       Current Facility-Administered Medications   Medication Dose Route Frequency Provider Last Rate Last Admin    budesonide-formoterol (SYMBICORT) 80-4.5 MCG/ACT inhaler 2 puff  2 puff Inhalation BID Huong You MD   2 puff at 02/19/22 0727    topiramate (TOPAMAX) tablet 125 mg  125 mg Oral BID [facility], DO   125 mg at 02/19/22 0520    ondansetron (ZOFRAN-ODT) disintegrating tablet 4 mg  4 mg Oral Q8H PRN Leandra German Sedar, DO        Or    ondansetron (ZOFRAN) injection 4 mg  4 mg IntraVENous Q6H PRN Junette German Sedar, DO        polyethylene glycol (GLYCOLAX) packet 17 g  17 g Oral Daily PRN Leandra German Sedar, DO        acetaminophen (TYLENOL) tablet 650 mg  650 mg Oral Q6H PRN Junette German Sedar, DO   650 mg at 02/18/22 1322    Or    acetaminophen (TYLENOL) suppository 650 mg  650 mg Rectal Q6H PRN Junette German Sedar, DO        lactated ringers infusion   IntraVENous Continuous Junette German Sedar, DO   Stopped at 02/19/22 0000    predniSONE (DELTASONE) tablet 40 mg  40 mg Oral Daily Fransico SESAY Sedar, DO   40 mg at 02/19/22 0926    nystatin (MYCOSTATIN) 276523 UNIT/ML suspension 500,000 Units  5 mL Oral 4x Daily Junette German Sedar, DO   500,000 Units at 02/19/22 0929    sertraline (ZOLOFT) tablet 100 mg  100 mg Oral Daily Vilma Kayla D Sedar, DO   100 mg at 02/19/22 4965    rosuvastatin (CRESTOR) tablet 40 mg  40 mg Oral QPM Fransico SESAY Sedar, DO   40 mg at 02/18/22 1823    levothyroxine (SYNTHROID) tablet 75 mcg  75 mcg Oral Daily Junette German Sedar, DO   75 mcg at 02/19/22 0520    fluticasone (FLONASE) 50 MCG/ACT nasal spray 1 spray  1 spray Nasal Daily Junette German Sedar, DO        apixaban (ELIQUIS) tablet 5 mg  5 mg Oral BID Junette German Sedar, DO   5 mg at 02/19/22 0926    ipratropium-albuterol (DUONEB) nebulizer solution 1 ampule  1 ampule Inhalation Q4H PRN Nathanael Murphy MD        sodium chloride flush 0.9 % injection 10 mL  10 mL IntraVENous BID Leonarda Daley MD   10 mL at 02/19/22 0930       Additional work up or/and treatment plan may be added today or then after based on clinical progression. I am managing a portion of pt care. Some medical issues are handled by other specialists. Additional work up and treatment should be done in out pt setting by pt PCP and other out pt providers. In addition to examining and evaluating pt, I spent additional time explaining care, normaland abnormal findings, and treatment plan. All of pt questions were answered. Counseling, diet and education were provided. Case will be discussed with nursing staff when appropriate. Family will be updated if and when appropriate.        Electronically signed by Stormy Pham DO on 2/19/2022 at 3:18 PM

## 2022-02-19 NOTE — PROGRESS NOTES
Physical Therapy Missed Treatment   Facility/Department: [facility] MED SURG [id number]    NAME: Snow Hernández    : 1965 (59 y.o.)  MRN: [id number]    Account: [de-identified]  Gender: female    Chart reviewed, attempted PT at [phone]. Patient unavailable 2° to:    [] Pt declined- pt resting in bed visibly tremoring stating \"I'm so cold, I don't want to get out of bed. \" Offered pt warm blankets which she was very grateful for. Pt states she may also be going home today. [x] Nsg notified (PCA in to take vitals upon departure)     Will attempt PT treatment again at earliest convenience.       Electronically signed by Leo Bean PTA on 22 at 1:51 PM EST

## 2022-02-20 NOTE — PROGRESS NOTES
Physical Therapy  Facility/Department: [facility] [id number]  Physical Therapy Discharge      NAME: Snow Hernández    : 1965 (59 y.o.)  MRN: [id number]    Account: [de-identified]  Gender: female      Patient has been discharged from acute care hospital. DC patient from current PT program.      Electronically signed by Ilana Jeronimo PT on 22 at 2:56 PM EST

## 2022-03-01 ENCOUNTER — HOSPITAL ENCOUNTER (EMERGENCY)
Age: 57
Discharge: HOME | End: 2022-03-01
Attending: STUDENT IN AN ORGANIZED HEALTH CARE EDUCATION/TRAINING PROGRAM
Payer: MEDICARE

## 2022-03-01 ENCOUNTER — APPOINTMENT (OUTPATIENT)
Dept: GENERAL RADIOLOGY | Age: 57
End: 2022-03-01
Payer: MEDICARE

## 2022-03-01 ENCOUNTER — OFFICE VISIT (OUTPATIENT)
Dept: FAMILY MEDICINE CLINIC | Age: 57
End: 2022-03-01
Payer: MEDICARE

## 2022-03-01 VITALS
HEIGHT: 65 IN | RESPIRATION RATE: 20 BRPM | WEIGHT: 200 LBS | TEMPERATURE: 98.6 F | OXYGEN SATURATION: 95 % | DIASTOLIC BLOOD PRESSURE: 75 MMHG | BODY MASS INDEX: 33.32 KG/M2 | HEART RATE: 90 BPM | SYSTOLIC BLOOD PRESSURE: 125 MMHG

## 2022-03-01 VITALS
OXYGEN SATURATION: 95 % | WEIGHT: 203.8 LBS | HEIGHT: 66 IN | TEMPERATURE: 96.9 F | DIASTOLIC BLOOD PRESSURE: 64 MMHG | SYSTOLIC BLOOD PRESSURE: 127 MMHG | BODY MASS INDEX: 32.75 KG/M2 | HEART RATE: 75 BPM

## 2022-03-01 DIAGNOSIS — J44.9 CHRONIC OBSTRUCTIVE PULMONARY DISEASE, UNSPECIFIED COPD TYPE (HCC): ICD-10-CM

## 2022-03-01 DIAGNOSIS — Z12.31 SCREENING MAMMOGRAM, ENCOUNTER FOR: ICD-10-CM

## 2022-03-01 DIAGNOSIS — R55 SYNCOPE AND COLLAPSE: ICD-10-CM

## 2022-03-01 DIAGNOSIS — J44.1 COPD EXACERBATION (HCC): Primary | ICD-10-CM

## 2022-03-01 DIAGNOSIS — R55 SYNCOPE, UNSPECIFIED SYNCOPE TYPE: ICD-10-CM

## 2022-03-01 DIAGNOSIS — F17.210 NICOTINE DEPENDENCE, CIGARETTES, UNCOMPLICATED: ICD-10-CM

## 2022-03-01 DIAGNOSIS — Z09 HOSPITAL DISCHARGE FOLLOW-UP: ICD-10-CM

## 2022-03-01 DIAGNOSIS — E78.5 HYPERLIPIDEMIA, UNSPECIFIED HYPERLIPIDEMIA TYPE: ICD-10-CM

## 2022-03-01 DIAGNOSIS — Z76.89 ENCOUNTER TO ESTABLISH CARE: Primary | ICD-10-CM

## 2022-03-01 DIAGNOSIS — F41.9 ANXIETY: ICD-10-CM

## 2022-03-01 DIAGNOSIS — Z79.899 POLYPHARMACY: ICD-10-CM

## 2022-03-01 DIAGNOSIS — F33.1 MODERATE EPISODE OF RECURRENT MAJOR DEPRESSIVE DISORDER (HCC): ICD-10-CM

## 2022-03-01 DIAGNOSIS — J40 BRONCHITIS: ICD-10-CM

## 2022-03-01 PROBLEM — I27.82 CHRONIC PULMONARY EMBOLISM (HCC): Status: ACTIVE | Noted: 2020-01-27

## 2022-03-01 PROBLEM — N18.9 CHRONIC KIDNEY DISEASE: Status: ACTIVE | Noted: 2017-12-12

## 2022-03-01 PROBLEM — E87.6 HYPOKALEMIA: Status: ACTIVE | Noted: 2017-12-12

## 2022-03-01 PROBLEM — Z79.01 LONG TERM (CURRENT) USE OF ANTICOAGULANTS: Status: ACTIVE | Noted: 2018-04-02

## 2022-03-01 PROBLEM — M54.2 CERVICALGIA: Status: ACTIVE | Noted: 2018-11-12

## 2022-03-01 PROBLEM — I25.10 ATHEROSCLEROSIS OF NATIVE CORONARY ARTERY OF NATIVE HEART WITHOUT ANGINA PECTORIS: Status: ACTIVE | Noted: 2017-12-12

## 2022-03-01 LAB
ALBUMIN SERPL-MCNC: 4.2 G/DL (ref 3.5–4.6)
ALP BLD-CCNC: 128 U/L (ref 40–130)
ALT SERPL-CCNC: 23 U/L (ref 0–33)
ANION GAP SERPL CALCULATED.3IONS-SCNC: 13 MEQ/L (ref 9–15)
AST SERPL-CCNC: 19 U/L (ref 0–35)
BACTERIA: ABNORMAL /HPF
BASOPHILS ABSOLUTE: 0 K/UL (ref 0–0.2)
BASOPHILS RELATIVE PERCENT: 0.6 %
BILIRUB SERPL-MCNC: 0.3 MG/DL (ref 0.2–0.7)
BILIRUBIN URINE: NEGATIVE
BLOOD, URINE: NEGATIVE
BUN BLDV-MCNC: 20 MG/DL (ref 6–20)
CALCIUM SERPL-MCNC: 8.6 MG/DL (ref 8.5–9.9)
CHLORIDE BLD-SCNC: 104 MEQ/L (ref 95–107)
CLARITY: CLEAR
CO2: 27 MEQ/L (ref 20–31)
COLOR: YELLOW
CREAT SERPL-MCNC: 0.86 MG/DL (ref 0.5–0.9)
EOSINOPHILS ABSOLUTE: 0 K/UL (ref 0–0.7)
EOSINOPHILS RELATIVE PERCENT: 0.2 %
EPITHELIAL CELLS, UA: ABNORMAL /HPF (ref 0–5)
GFR AFRICAN AMERICAN: >60
GFR NON-AFRICAN AMERICAN: >60
GLOBULIN: 2.6 G/DL (ref 2.3–3.5)
GLUCOSE BLD-MCNC: 96 MG/DL (ref 70–99)
GLUCOSE URINE: NEGATIVE MG/DL
HCT VFR BLD CALC: 42.4 % (ref 37–47)
HEMOGLOBIN: 14.2 G/DL (ref 12–16)
HYALINE CASTS: ABNORMAL /HPF (ref 0–5)
KETONES, URINE: NEGATIVE MG/DL
LACTIC ACID: 1.1 MMOL/L (ref 0.5–2.2)
LEUKOCYTE ESTERASE, URINE: ABNORMAL
LYMPHOCYTES ABSOLUTE: 2.4 K/UL (ref 1–4.8)
LYMPHOCYTES RELATIVE PERCENT: 40.3 %
MCH RBC QN AUTO: 31.4 PG (ref 27–31.3)
MCHC RBC AUTO-ENTMCNC: 33.5 % (ref 33–37)
MCV RBC AUTO: 93.8 FL (ref 82–100)
MONOCYTES ABSOLUTE: 1 K/UL (ref 0.2–0.8)
MONOCYTES RELATIVE PERCENT: 16.1 %
NEUTROPHILS ABSOLUTE: 2.6 K/UL (ref 1.4–6.5)
NEUTROPHILS RELATIVE PERCENT: 42.8 %
NITRITE, URINE: NEGATIVE
PDW BLD-RTO: 16.4 % (ref 11.5–14.5)
PH UA: 5 (ref 5–9)
PLATELET # BLD: 231 K/UL (ref 130–400)
POTASSIUM SERPL-SCNC: 3.1 MEQ/L (ref 3.4–4.9)
PROTEIN UA: NEGATIVE MG/DL
RBC # BLD: 4.53 M/UL (ref 4.2–5.4)
RBC UA: ABNORMAL /HPF (ref 0–5)
SARS-COV-2, NAAT: NOT DETECTED
SODIUM BLD-SCNC: 144 MEQ/L (ref 135–144)
SPECIFIC GRAVITY UA: 1.01 (ref 1–1.03)
TOTAL CK: 137 U/L (ref 0–170)
TOTAL PROTEIN: 6.8 G/DL (ref 6.3–8)
TROPONIN: <0.01 NG/ML (ref 0–0.01)
URINE REFLEX TO CULTURE: ABNORMAL
UROBILINOGEN, URINE: 0.2 E.U./DL
WBC # BLD: 6 K/UL (ref 4.8–10.8)
WBC UA: ABNORMAL /HPF (ref 0–5)

## 2022-03-01 PROCEDURE — 99205 OFFICE O/P NEW HI 60 MIN: CPT | Performed by: STUDENT IN AN ORGANIZED HEALTH CARE EDUCATION/TRAINING PROGRAM

## 2022-03-01 PROCEDURE — 83605 ASSAY OF LACTIC ACID: CPT

## 2022-03-01 PROCEDURE — 1111F DSCHRG MED/CURRENT MED MERGE: CPT | Performed by: STUDENT IN AN ORGANIZED HEALTH CARE EDUCATION/TRAINING PROGRAM

## 2022-03-01 PROCEDURE — 6370000000 HC RX 637 (ALT 250 FOR IP): Performed by: STUDENT IN AN ORGANIZED HEALTH CARE EDUCATION/TRAINING PROGRAM

## 2022-03-01 PROCEDURE — 87040 BLOOD CULTURE FOR BACTERIA: CPT

## 2022-03-01 PROCEDURE — 81001 URINALYSIS AUTO W/SCOPE: CPT

## 2022-03-01 PROCEDURE — 93005 ELECTROCARDIOGRAM TRACING: CPT

## 2022-03-01 PROCEDURE — 2700000000 HC OXYGEN THERAPY PER DAY

## 2022-03-01 PROCEDURE — 84484 ASSAY OF TROPONIN QUANT: CPT

## 2022-03-01 PROCEDURE — 6360000002 HC RX W HCPCS: Performed by: STUDENT IN AN ORGANIZED HEALTH CARE EDUCATION/TRAINING PROGRAM

## 2022-03-01 PROCEDURE — 85025 COMPLETE CBC W/AUTO DIFF WBC: CPT

## 2022-03-01 PROCEDURE — 94760 N-INVAS EAR/PLS OXIMETRY 1: CPT

## 2022-03-01 PROCEDURE — 71045 X-RAY EXAM CHEST 1 VIEW: CPT

## 2022-03-01 PROCEDURE — 82550 ASSAY OF CK (CPK): CPT

## 2022-03-01 PROCEDURE — 87635 SARS-COV-2 COVID-19 AMP PRB: CPT

## 2022-03-01 PROCEDURE — 99285 EMERGENCY DEPT VISIT HI MDM: CPT

## 2022-03-01 PROCEDURE — 94640 AIRWAY INHALATION TREATMENT: CPT

## 2022-03-01 PROCEDURE — 36415 COLL VENOUS BLD VENIPUNCTURE: CPT

## 2022-03-01 PROCEDURE — 80053 COMPREHEN METABOLIC PANEL: CPT

## 2022-03-01 PROCEDURE — 2580000003 HC RX 258: Performed by: PHYSICIAN ASSISTANT

## 2022-03-01 RX ORDER — BUDESONIDE AND FORMOTEROL FUMARATE DIHYDRATE 80; 4.5 UG/1; UG/1
2 AEROSOL RESPIRATORY (INHALATION) 2 TIMES DAILY
COMMUNITY

## 2022-03-01 RX ORDER — ALBUTEROL SULFATE 2.5 MG/3ML
2.5 SOLUTION RESPIRATORY (INHALATION) EVERY 6 HOURS PRN
Status: DISCONTINUED | OUTPATIENT
Start: 2022-03-01 | End: 2022-03-01

## 2022-03-01 RX ORDER — IPRATROPIUM BROMIDE AND ALBUTEROL SULFATE 2.5; .5 MG/3ML; MG/3ML
SOLUTION RESPIRATORY (INHALATION)
Status: DISCONTINUED
Start: 2022-03-01 | End: 2022-03-01 | Stop reason: HOSPADM

## 2022-03-01 RX ORDER — LIDOCAINE 4 G/G
1 PATCH TOPICAL DAILY PRN
COMMUNITY
Start: 2022-01-01 | End: 2022-09-13

## 2022-03-01 RX ORDER — CLOTRIMAZOLE AND BETAMETHASONE DIPROPIONATE 10; .64 MG/G; MG/G
CREAM TOPICAL
COMMUNITY
Start: 2021-09-28

## 2022-03-01 RX ORDER — DICLOFENAC SODIUM 75 MG/1
75 TABLET, DELAYED RELEASE ORAL 2 TIMES DAILY
COMMUNITY
Start: 2022-02-03

## 2022-03-01 RX ORDER — SODIUM CHLORIDE 9 MG/ML
INJECTION, SOLUTION INTRAVENOUS CONTINUOUS
Status: DISCONTINUED | OUTPATIENT
Start: 2022-03-01 | End: 2022-03-01 | Stop reason: HOSPADM

## 2022-03-01 RX ORDER — IPRATROPIUM BROMIDE AND ALBUTEROL SULFATE 2.5; .5 MG/3ML; MG/3ML
1 SOLUTION RESPIRATORY (INHALATION)
Status: ACTIVE | OUTPATIENT
Start: 2022-03-01 | End: 2022-03-01

## 2022-03-01 RX ORDER — DOXYCYCLINE HYCLATE 100 MG
100 TABLET ORAL 2 TIMES DAILY
Qty: 20 TABLET | Refills: 0 | Status: SHIPPED | OUTPATIENT
Start: 2022-03-01 | End: 2022-03-11

## 2022-03-01 RX ORDER — PREDNISONE 20 MG/1
40 TABLET ORAL DAILY
Qty: 20 TABLET | Refills: 0 | Status: SHIPPED | OUTPATIENT
Start: 2022-03-01 | End: 2022-03-11

## 2022-03-01 RX ORDER — TRAMADOL HYDROCHLORIDE 50 MG/1
TABLET ORAL
COMMUNITY
Start: 2022-01-04 | End: 2022-09-13

## 2022-03-01 RX ORDER — ALBUTEROL SULFATE 2.5 MG/3ML
2.5 SOLUTION RESPIRATORY (INHALATION)
Status: COMPLETED | OUTPATIENT
Start: 2022-03-01 | End: 2022-03-01

## 2022-03-01 RX ORDER — IPRATROPIUM BROMIDE AND ALBUTEROL SULFATE 2.5; .5 MG/3ML; MG/3ML
1 SOLUTION RESPIRATORY (INHALATION)
Status: DISCONTINUED | OUTPATIENT
Start: 2022-03-01 | End: 2022-03-01

## 2022-03-01 RX ORDER — ALBUTEROL SULFATE 2.5 MG/3ML
2.5 SOLUTION RESPIRATORY (INHALATION) EVERY 6 HOURS PRN
COMMUNITY

## 2022-03-01 RX ORDER — FLUTICASONE PROPIONATE 220 UG/1
1 AEROSOL, METERED RESPIRATORY (INHALATION) 2 TIMES DAILY
COMMUNITY
End: 2022-09-13

## 2022-03-01 RX ORDER — TRAZODONE HYDROCHLORIDE 50 MG/1
50 TABLET ORAL NIGHTLY
COMMUNITY
Start: 2022-02-10 | End: 2023-02-10

## 2022-03-01 RX ORDER — FUROSEMIDE 20 MG/1
20 TABLET ORAL DAILY
COMMUNITY
Start: 2022-02-03

## 2022-03-01 RX ADMIN — SODIUM CHLORIDE: 9 INJECTION, SOLUTION INTRAVENOUS at 17:08

## 2022-03-01 RX ADMIN — ALBUTEROL SULFATE 2.5 MG: 2.5 SOLUTION RESPIRATORY (INHALATION) at 16:51

## 2022-03-01 RX ADMIN — ALBUTEROL SULFATE 2.5 MG: 2.5 SOLUTION RESPIRATORY (INHALATION) at 17:02

## 2022-03-01 RX ADMIN — IPRATROPIUM BROMIDE AND ALBUTEROL SULFATE 1 AMPULE: .5; 2.5 SOLUTION RESPIRATORY (INHALATION) at 16:52

## 2022-03-01 SDOH — ECONOMIC STABILITY: FOOD INSECURITY: WITHIN THE PAST 12 MONTHS, YOU WORRIED THAT YOUR FOOD WOULD RUN OUT BEFORE YOU GOT MONEY TO BUY MORE.: SOMETIMES TRUE

## 2022-03-01 SDOH — ECONOMIC STABILITY: TRANSPORTATION INSECURITY
IN THE PAST 12 MONTHS, HAS THE LACK OF TRANSPORTATION KEPT YOU FROM MEDICAL APPOINTMENTS OR FROM GETTING MEDICATIONS?: YES

## 2022-03-01 SDOH — ECONOMIC STABILITY: FOOD INSECURITY: WITHIN THE PAST 12 MONTHS, THE FOOD YOU BOUGHT JUST DIDN'T LAST AND YOU DIDN'T HAVE MONEY TO GET MORE.: SOMETIMES TRUE

## 2022-03-01 SDOH — ECONOMIC STABILITY: TRANSPORTATION INSECURITY
IN THE PAST 12 MONTHS, HAS LACK OF TRANSPORTATION KEPT YOU FROM MEETINGS, WORK, OR FROM GETTING THINGS NEEDED FOR DAILY LIVING?: YES

## 2022-03-01 ASSESSMENT — ENCOUNTER SYMPTOMS
VOMITING: 0
DIARRHEA: 0
SORE THROAT: 0
SORE THROAT: 0
COUGH: 1
CONSTIPATION: 0
RHINORRHEA: 0
NAUSEA: 0
EYE DISCHARGE: 0
SHORTNESS OF BREATH: 1
COUGH: 1
ABDOMINAL PAIN: 0
COLOR CHANGE: 0
ABDOMINAL DISTENTION: 0
SHORTNESS OF BREATH: 1
RHINORRHEA: 0
WHEEZING: 1
EYE DISCHARGE: 0
ABDOMINAL PAIN: 0

## 2022-03-01 ASSESSMENT — PATIENT HEALTH QUESTIONNAIRE - PHQ9
1. LITTLE INTEREST OR PLEASURE IN DOING THINGS: 0
5. POOR APPETITE OR OVEREATING: 3
3. TROUBLE FALLING OR STAYING ASLEEP: 3
8. MOVING OR SPEAKING SO SLOWLY THAT OTHER PEOPLE COULD HAVE NOTICED. OR THE OPPOSITE, BEING SO FIGETY OR RESTLESS THAT YOU HAVE BEEN MOVING AROUND A LOT MORE THAN USUAL: 3
SUM OF ALL RESPONSES TO PHQ QUESTIONS 1-9: 17
SUM OF ALL RESPONSES TO PHQ QUESTIONS 1-9: 1
SUM OF ALL RESPONSES TO PHQ QUESTIONS 1-9: 1
1. LITTLE INTEREST OR PLEASURE IN DOING THINGS: 0
10. IF YOU CHECKED OFF ANY PROBLEMS, HOW DIFFICULT HAVE THESE PROBLEMS MADE IT FOR YOU TO DO YOUR WORK, TAKE CARE OF THINGS AT HOME, OR GET ALONG WITH OTHER PEOPLE: 1
7. TROUBLE CONCENTRATING ON THINGS, SUCH AS READING THE NEWSPAPER OR WATCHING TELEVISION: 3
SUM OF ALL RESPONSES TO PHQ QUESTIONS 1-9: 1
2. FEELING DOWN, DEPRESSED OR HOPELESS: 1
SUM OF ALL RESPONSES TO PHQ QUESTIONS 1-9: 17
2. FEELING DOWN, DEPRESSED OR HOPELESS: 1
4. FEELING TIRED OR HAVING LITTLE ENERGY: 3
SUM OF ALL RESPONSES TO PHQ9 QUESTIONS 1 & 2: 1
SUM OF ALL RESPONSES TO PHQ QUESTIONS 1-9: 17
6. FEELING BAD ABOUT YOURSELF - OR THAT YOU ARE A FAILURE OR HAVE LET YOURSELF OR YOUR FAMILY DOWN: 1
SUM OF ALL RESPONSES TO PHQ QUESTIONS 1-9: 1
SUM OF ALL RESPONSES TO PHQ9 QUESTIONS 1 & 2: 1
SUM OF ALL RESPONSES TO PHQ QUESTIONS 1-9: 17
9. THOUGHTS THAT YOU WOULD BE BETTER OFF DEAD, OR OF HURTING YOURSELF: 0

## 2022-03-01 ASSESSMENT — COLUMBIA-SUICIDE SEVERITY RATING SCALE - C-SSRS
6. HAVE YOU EVER DONE ANYTHING, STARTED TO DO ANYTHING, OR PREPARED TO DO ANYTHING TO END YOUR LIFE?: YES
1. WITHIN THE PAST MONTH, HAVE YOU WISHED YOU WERE DEAD OR WISHED YOU COULD GO TO SLEEP AND NOT WAKE UP?: NO
7. DID THIS OCCUR IN THE LAST THREE MONTHS: NO
2. HAVE YOU ACTUALLY HAD ANY THOUGHTS OF KILLING YOURSELF?: NO

## 2022-03-01 ASSESSMENT — PAIN DESCRIPTION - LOCATION: LOCATION: BACK;HEAD

## 2022-03-01 ASSESSMENT — PAIN SCALES - GENERAL: PAINLEVEL_OUTOF10: 9

## 2022-03-01 ASSESSMENT — PAIN - FUNCTIONAL ASSESSMENT: PAIN_FUNCTIONAL_ASSESSMENT: 0-10

## 2022-03-01 ASSESSMENT — PAIN DESCRIPTION - ORIENTATION: ORIENTATION: LOWER

## 2022-03-01 ASSESSMENT — PAIN DESCRIPTION - FREQUENCY: FREQUENCY: CONTINUOUS

## 2022-03-01 ASSESSMENT — PAIN DESCRIPTION - PAIN TYPE: TYPE: ACUTE PAIN

## 2022-03-01 ASSESSMENT — SOCIAL DETERMINANTS OF HEALTH (SDOH): HOW HARD IS IT FOR YOU TO PAY FOR THE VERY BASICS LIKE FOOD, HOUSING, MEDICAL CARE, AND HEATING?: VERY HARD

## 2022-03-01 ASSESSMENT — PAIN DESCRIPTION - DESCRIPTORS: DESCRIPTORS: ACHING;SORE

## 2022-03-01 NOTE — PATIENT INSTRUCTIONS
Family and Housing Resources    Dennis Valentine of [address] [place] [address]  Call 211  or - www. [address] [place] [address]. [address] [place] [address])  Call - [phone]  [email or web address]    [facility]  [address] [facility] [address] [facility] [address]  Julia, [address] [place] [address]  [address] [facility] [address]  [phone] /[phone]    Medicaid Application  Https://benefits. [place].gov/  [phone]    Home Energy Assistance Programs (HEAP)  [address]. Estephanie, [address] [facility]  [phone]    [facility] ( skilled nursing)  [address] [facility], [address] [facility] (skilled nursing)  [address], [address] [facility] [address]  [address] [facility]  www. [employer]    [employer]  [address] [place] [address], [address]  [address] [facility]  [address] [facility] [facility] [phone] 1605 [facility] at [address] and [address] [facility] 49 of [address] [place] [address]  Call 211 or  www. [address] [place], [place] [address]    [facility]. [address] [facility])  Call - [phone]  [email or web address] net      [employer] / [employer] on [facility]  [address] [facility] [address] Beebe   [phone]  *regular & [address]. [address], [address]  [phone]  *regular & special diets  60+ [facility] on [address]  [address] [facility]. [place], [address] [facility] [address]  [phone], ext. [address] [facility] on [address] [facility].   [place], [address] [facility]  [phone]  *regular & special diets  [address], [address] and Amee only    [address] [facility] on [facility] 40  [address]  [place], [place] Marino Castro 1154 100 [facility], [address] [place] [address] Road  [phone]  [facility] 109 only    [address]iqu[address] on [address] [place]. [place], [address] [place] [address]  [phone]  61 + and/or disables    [address])  Call - [phone]  www.[email or web address]    [address] [place] [address]  Call 211 or  www[employer]      Provide A Ride  [phone]    [employer]  [address]  Non-Emergency:  [address]  [address] [facility]. [address]  [phone]  [facility], [facility]    [employer] on [address] [address]  [phone]  Age 65+ limited tranportation area. 24 hour notice required. [facility])  Call - [phone]  www.[email or web address]    [address] [place] [address] of [address] [place] [address]  Call 211 or  wwwProver [employer]      Provide A Ride  [phone]    [employer]  [address]  Non-Emergency:  [address]  [address] [facility]. [address]  [phone]  [facility], [facility]    [employer] on [facility] [address] [address]  [phone]  Age 65+ limited tranportation area. 24 hour notice required.
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
## 2022-03-01 NOTE — ED TRIAGE NOTES
Pt arrived via EMS. Hx COPD, placed on CPAP by EMS. 125mg Solumedrol and duo neb given in route. EMS states blue lips with SOB with pulse ox 94% with increased work of breathing.

## 2022-03-01 NOTE — ED PROVIDER NOTES
[address] [facility] ED  eMERGENCY dEPARTMENT eNCOUnter      Pt Name: Eleuterio Joyner  MRN: [id number]  Josephgfdada 1965  Date of evaluation: 3/1/2022  Provider: Janel Flores PA-C    CHIEF COMPLAINT       Chief Complaint   Patient presents with    Shortness of Breath         HISTORY OF PRESENT ILLNESS   (Location/Symptom, Timing/Onset,Context/Setting, Quality, Duration, Modifying Factors, Severity)  Note limiting factors. Eleuterio Joyner is a 59 y.o. female who presents to the emergency department with a complaint of syncopal episode while at her family doctor's office. Patient states that she had just recently moved to this area from [place], she had a recent admission to the hospital for syncope, she today's visit with her family doctor was a follow-up from her previous admission. She states she is been very stressed recently moving to the area, establishing with a new primary doctor, and trying to find a home. She states that she has had syncopal episodes in the past secondary to stress. She states that she has had doctors visits all day today, and while checking out she states she passed out in the office. Patient does have past history of COPD, and was having some shortness of breath, she was given Solu-Medrol, magnesium, and transported to the ED by EMS. Patient denies any chest pain, she states some mild shortness of breath, she states she just recently started smoking again approximately 1/2 pack/day. Past medical history per chart review, chronic pain gastric reflux, hyperlipidemia, DVT, fibromyalgia, depression, hyper pretension irritable bowel syndrome, chronic migraine headaches, history of craniotomy. Patient denies any acute pain, no injury secondary to syncopal episode. HPI    NursingNotes were reviewed.     REVIEW OF SYSTEMS    (2-9 systems for level 4, 10 or more for level 5)     Review of Systems   Constitutional: Negative for activity change, appetite change, fatigue and fever. HENT: Negative for congestion, ear discharge, ear pain, nosebleeds, rhinorrhea and sore throat. Eyes: Negative for discharge. Respiratory: Positive for cough and shortness of breath. Cardiovascular: Negative for chest pain, palpitations and leg swelling. Gastrointestinal: Negative for abdominal distention, abdominal pain and constipation. Genitourinary: Negative for difficulty urinating and dysuria. Musculoskeletal: Negative for arthralgias. Skin: Negative for color change. Neurological: Positive for syncope. Negative for dizziness, tremors, weakness, numbness and headaches. Psychiatric/Behavioral: Negative for agitation and confusion. Except as noted above the remainder of the review of systems was reviewed and negative.        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    24 hour urine studies and aldosterone test negative     Chronic pain     Deep venous thrombosis ([facility] [address] 2011    Depression     Essential hypertension     Fibromyalgia     GERD (gastroesophageal reflux disease)     Heat intolerance     Hip pain     History of craniotomy     [place] MAL[place]    Hyperlipidemia 2009    IBS (irritable bowel syndrome)     Meralgia paraesthetica     Migraine headache     Obesity     Osteopenia     Pedal edema     Sleep apnea     Umbilical hernia     Uterine fibroid          SURGICALHISTORY       Past Surgical History:   Procedure Laterality Date     SECTION      x3    COLONOSCOPY  2013    normal    CRANIOTOMY  2007    [place]    FIBULA FRACTURE SURGERY  2009    ORIF right tibia and fibula     ORIF FEMUR DECOMPRESSION      right    PATELLA SURGERY  2007    partial resection right patella     TONSILLECTOMY      UPPER GASTROINTESTINAL ENDOSCOPY  2013         CURRENT MEDICATIONS       Discharge Medication List as of 3/1/2022  7:48 PM      CONTINUE these medications which have NOT CHANGED    Details   furosemide (LASIX) 20 MG tablet Take 20 mg by mouth dailyHistorical Med      clotrimazole-betamethasone (LOTRISONE) 1-0.05 % cream APPLY TO AFFECTED AREA TOPICALLY TWICE DAILY, Historical Med      diclofenac (VOLTAREN) 75 MG EC tablet Take 75 mg by mouth 2 times dailyHistorical Med      lidocaine 4 % external patch Place 1 patch onto the skin daily as needed, TransDERmal, DAILY PRN Starting Sat 1/1/2022, Historical Med      traMADol (ULTRAM) 50 MG tablet TAKE 1 TABLET BY MOUTH EVERY 6 HOURS AS NEEDEDHistorical Med      traZODone (DESYREL) 50 MG tablet Take 50 mg by mouth nightlyHistorical Med      fluticasone (FLOVENT HFA) 220 MCG/ACT inhaler Inhale 1 puff into the lungs 2 times dailyHistorical Med      budesonide-formoterol (SYMBICORT) 80-4.5 MCG/ACT AERO Inhale 2 puffs into the lungs 2 times dailyHistorical Med      albuterol (PROVENTIL) (2.5 MG/3ML) 0.083% nebulizer solution Take 2.5 mg by nebulization every 6 hours as needed for WheezingHistorical Med      sertraline (ZOLOFT) 100 MG tablet Take 1 tablet by mouth daily, Disp-30 tablet, R-3Normal      tiZANidine (ZANAFLEX) 4 MG tablet Take 4 mg by mouth every 8 hours as neededHistorical Med      rivaroxaban (XARELTO) 20 MG TABS tablet Take 20 mg by mouth Daily with supperHistorical Med      pantoprazole sodium (PROTONIX) 40 MG PACK packet Take 40 mg by mouth every morning (before breakfast)Historical Med      propranolol (INDERAL) 40 MG tablet Take 40 mg by mouth 2 times dailyHistorical Med      !! topiramate (TOPAMAX) 100 MG tablet Take 100 mg by mouth 2 times dailyHistorical Med      !! topiramate (TOPAMAX) 25 MG tablet Take 25 mg by mouth 2 times dailyHistorical Med      gabapentin (NEURONTIN) 800 MG tablet Take 1 tablet by mouth 3 times daily, Disp-270 tablet, R-1      ketoconazole (NIZORAL) 2 % cream Apply topically daily Apply topically daily.  Skin folds and under breasts, Topical, DAILY, Historical Med      albuterol sulfate HFA (PROVENTIL HFA) 108 (90 BASE) MCG/ACT inhaler Inhale 2 puffs into the lungs every 6 hours as needed for Wheezing, Disp-3 Inhaler, R-3Dispense as \"Pro Air\"Normal      LYRICA 225 MG capsule Take 1 capsule by mouth 2 times daily, R-3, MARKO      nortriptyline (PAMELOR) 25 MG capsule Take 50 mg by mouth nightly      potassium chloride SA (K-DUR;KLOR-CON M) 20 MEQ tablet Take 1 tablet by mouth daily, Disp-90 tablet, R-3      triamterene-hydrochlorothiazide (MAXZIDE-25) 37.5-25 MG per tablet Take 1 tablet by mouth daily, Disp-90 tablet, R-3      levothyroxine (LEVOTHROID) 75 MCG tablet Take 1 tablet by mouth daily, Disp-90 tablet, R-3      rosuvastatin (CRESTOR) 40 MG tablet Take 1 tablet by mouth every evening, Disp-90 tablet, R-3      DULoxetine (CYMBALTA) 60 MG capsule Take 1 capsule by mouth 2 times daily, Disp-180 capsule, R-3      dicyclomine (BENTYL) 20 MG tablet Take 1 tablet by mouth three times daily, Disp-270 tablet, R-3       !! - Potential duplicate medications found. Please discuss with provider. ALLERGIES     Latex, Aspirin, Niaspan [niacin er], and Simvastatin    FAMILY HISTORY       Family History   Problem Relation Age of Onset    Other Father         ALS    Cancer Maternal Grandmother     Cancer Maternal Grandfather     Cancer Paternal Grandmother     Cancer Paternal Grandfather           SOCIAL HISTORY       Social History     Socioeconomic History    Marital status:       Spouse name: Not on file    Number of children: Not on file    Years of education: Not on file    Highest education level: Not on file   Occupational History    Not on file   Tobacco Use    Smoking status: Current Every Day Smoker     Packs/day: 0.25     Years: 40.00     Pack years: 10.00     Types: Cigarettes     Last attempt to quit: 2016     Years since quittin.8    Smokeless tobacco: Never Used   Substance and Sexual Activity    Alcohol use: No     Alcohol/week: 0.0 standard drinks    Drug use: No    Sexual activity: Not on file   Other Topics Concern    Not on file   Social History Narrative    Not on file     Social Determinants of Health     Financial Resource Strain: High Risk    Difficulty of Paying Living Expenses: Very hard   Food Insecurity: Food Insecurity Present    Worried About Running Out of Food in the Last Year: Sometimes true    Paula of Food in the Last Year: Sometimes true   Transportation Needs: Unmet Transportation Needs    Lack of Transportation (Medical):  Yes    Lack of Transportation (Non-Medical): Yes   Physical Activity:     Days of Exercise per Week: Not on file    Minutes of Exercise per Session: Not on file   Stress:     Feeling of Stress : Not on file   Social Connections:     Frequency of Communication with Friends and Family: Not on file    Frequency of Social Gatherings with Friends and Family: Not on file    Attends Mandaeism Services: Not on file    Active Member of [address] [place] [address] or Organizations: Not on file    Attends Club or Organization Meetings: Not on file    Marital Status: Not on file   Intimate Partner Violence:     Fear of Current or Ex-Partner: Not on file    Emotionally Abused: Not on file    Physically Abused: Not on file    Sexually Abused: Not on file   Housing Stability:     Unable to Pay for Housing in the Last Year: Not on file    Number of [place] in the Last Year: Not on file    Unstable Housing in the Last Year: Not on file       SCREENINGS    [place] Coma Scale  Eye Opening: Spontaneous  Best Verbal Response: Oriented  Best Motor Response: Obeys commands  [place] Coma Scale Score: 15 @FLOW([id number])@      PHYSICAL EXAM    (up to 7 for level 4, 8 or more for level 5)     ED Triage Vitals [03/01/22 1646]   BP Temp Temp Source Pulse Resp SpO2 Height Weight   116/78 98.6 °F (37 °C) Oral 78 22 95 % 5' 5\" (1.651 m) 200 lb (90.7 kg)       Physical Exam  Vitals and nursing note reviewed. Constitutional:       General: She is not in acute distress. Appearance: She is well-developed. She is not ill-appearing, toxic-appearing or diaphoretic. HENT:      Head: Normocephalic. Nose: No congestion. Mouth/Throat:      Mouth: Mucous membranes are moist.      Pharynx: No oropharyngeal exudate or posterior oropharyngeal erythema. Eyes:      Extraocular Movements: Extraocular movements intact. Conjunctiva/sclera: Conjunctivae normal.      Pupils: Pupils are equal, round, and reactive to light. Neck:      Vascular: No JVD. Trachea: No tracheal deviation. Cardiovascular:      Rate and Rhythm: Normal rate. Pulses: Normal pulses. Heart sounds: Normal heart sounds. No murmur heard. No friction rub. No gallop. Pulmonary:      Effort: Pulmonary effort is normal. No tachypnea, accessory muscle usage, respiratory distress or retractions. Breath sounds: No stridor. Examination of the right-upper field reveals wheezing. Examination of the left-upper field reveals wheezing. Examination of the right-middle field reveals wheezing. Examination of the left-middle field reveals wheezing. Examination of the right-lower field reveals wheezing. Examination of the left-lower field reveals wheezing. Decreased breath sounds and wheezing present. No rhonchi or rales. Comments: Patient has diminished lung sounds throughout all fields, with expiratory wheezes throughout. No rales or rhonchi are noted, no excess muscle use, retractions, saturations on 4 L nasal cannula oxygen 96%. Chest:      Chest wall: No tenderness. Abdominal:      General: Abdomen is flat. Bowel sounds are normal. There is no distension or abdominal bruit. Palpations: There is no shifting dullness, fluid wave, hepatomegaly, splenomegaly, mass or pulsatile mass. Tenderness: There is no abdominal tenderness.  There is no right CVA tenderness, left CVA tenderness, guarding or rebound. Negative signs include Jade's sign, Rovsing's sign and McBurney's sign. Musculoskeletal:         General: No deformity. Cervical back: Normal range of motion and neck supple. No rigidity. Right lower leg: No edema. Left lower leg: No edema. Skin:     General: Skin is warm and dry. Capillary Refill: Capillary refill takes less than 2 seconds. Coloration: Skin is not jaundiced. Neurological:      General: No focal deficit present. Mental Status: She is alert and oriented to person, place, and time. Mental status is at baseline. Cranial Nerves: No cranial nerve deficit. Sensory: No sensory deficit. Motor: No weakness. Coordination: Coordination normal.   Psychiatric:         Mood and Affect: Mood normal.         DIAGNOSTIC RESULTS     EKG: All EKG's are interpreted by the Emergency Department Physician who either signs or Co-signsthis chart in the absence of a cardiologist.    EKG shows normal sinus rhythm 75 bpm there is T wave inversions in leads V1, V2, V3 no ventricular ectopy  ms    RADIOLOGY:   Non-plain filmimages such as CT, Ultrasound and MRI are read by the radiologist. Plain radiographic images are visualized and preliminarily interpreted by the emergency physician with the below findings:      Interpretation per the Radiologist below, if available at the time ofthis note:    XR CHEST PORTABLE   Final Result      There is mild atelectasis versus infiltrate in the lung bases.                   ED BEDSIDE ULTRASOUND:   Performed by ED Physician - none    LABS:  Labs Reviewed   COMPREHENSIVE METABOLIC PANEL - Abnormal; Notable for the following components:       Result Value    Potassium 3.1 (*)     All other components within normal limits   CBC WITH AUTO DIFFERENTIAL - Abnormal; Notable for the following components:    MCH 31.4 (*)     RDW 16.4 (*)     Monocytes Absolute 1.0 (*)     All other components within normal limits   URINALYSIS WITH REFLEX TO CULTURE - Abnormal; Notable for the following components:    Leukocyte Esterase, Urine SMALL (*)     All other components within normal limits   MICROSCOPIC URINALYSIS - Abnormal; Notable for the following components:    Bacteria, UA RARE (*)     WBC, UA 6-9 (*)     RBC, UA 3-5 (*)     All other components within normal limits   COVID-19, RAPID   CULTURE, BLOOD 2    Narrative:     ORDER#: [id number]                          ORDERED BY: José Hawkins  SOURCE: Blood                              COLLECTED:  03/01/22 17:06  ANTIBIOTICS AT DEE DEE.:                      RECEIVED :  03/01/22 17:06   CULTURE, BLOOD 1    Narrative:     ORDER#: [id number]                          ORDERED BY: José Hawkins  SOURCE: Blood                              COLLECTED:  03/01/22 17:06  ANTIBIOTICS AT DEE DEE.:                      RECEIVED :  03/01/22 17:06   TROPONIN   CK   LACTIC ACID       All other labs were within normal range or not returned as of this dictation. EMERGENCY DEPARTMENT COURSE and DIFFERENTIAL DIAGNOSIS/MDM:   Vitals:    Vitals:    03/01/22 1745 03/01/22 1800 03/01/22 1830 03/01/22 1900   BP: 110/65 (!) 124/114 115/65 125/75   Pulse: 67 85 75 90   Resp: 12 20 18 20   Temp:       TempSrc:       SpO2:  96% 97% 95%   Weight:       Height:                MDM  Number of Diagnoses or Management Options  Bronchitis  COPD exacerbation (HCC)  Syncope and collapse  Diagnosis management comments: Patient presented ED with complaint of syncopal episode while visiting her primary doctor, she states she is had episodes of syncope in the past, most time related to stress. She states she has been under a lot of stress recently just recently moving to the area, establish with a new primary provider. She is alert and oriented during her visit in the ED, she was given IV fluids, respiratory treatments for cough and shortness of breath.   She displays no acute distress, labs are fairly nonspecific at this time. She was discharged in improved stable condition, she is able to stand and ambulate without increasing difficulty or dizziness ataxia. She was advised to contact her primary doctor for follow-up next 2 to 3 days, if she has any worsening or change symptoms, she was advised to return to the ED. CRITICAL CARE TIME   Total Critical Care time was 0 minutes, excluding separately reportableprocedures. There was a high probability of clinicallysignificant/life threatening deterioration in the patient's condition which required my urgent intervention. CONSULTS:  None    PROCEDURES:  Unless otherwise noted below, none     Procedures    FINAL IMPRESSION      1. COPD exacerbation ([facility] [address]    2. Bronchitis    3.  Syncope and collapse          DISPOSITION/PLAN   DISPOSITION Decision To Discharge 03/01/2022 07:47:24 PM      PATIENT REFERRED TO:  Zita Paul PA-C  [address] [address]  [phone]    Call   As needed      DISCHARGE MEDICATIONS:  Discharge Medication List as of 3/1/2022  7:48 PM      START taking these medications    Details   predniSONE (DELTASONE) 20 MG tablet Take 2 tablets by mouth daily for 10 days, Disp-20 tablet, R-0Print      doxycycline hyclate (VIBRA-TABS) 100 MG tablet Take 1 tablet by mouth 2 times daily for 10 days, Disp-20 tablet, R-0Print                (Please note that portions of this note were completed with a voice recognition program.  Efforts were made to edit the dictations but occasionally words are mis-transcribed.)    Dhruv Handy PA-C (electronically signed)  Attending Emergency Physician         Dhruv Handy PA-C  03/23/22 8224

## 2022-03-01 NOTE — ED TRIAGE NOTES
Pt arrived via EMS. Called to house after syncopal episode. Hx COPD, placed on CPAP by EMS. 125mg Solumedrol and duo neb given in route. EMS states blue lips with SOB with pulse ox 94% with increased work of breathing.

## 2022-03-02 ASSESSMENT — ENCOUNTER SYMPTOMS
DIARRHEA: 0
VOMITING: 0
PHOTOPHOBIA: 0
COUGH: 1
SHORTNESS OF BREATH: 1
NAUSEA: 0
ABDOMINAL PAIN: 0

## 2022-03-02 NOTE — ED PROVIDER NOTES
[address] [facility] ED  eMERGENCY dEPARTMENT eNCOUnter      Pt Name: Merary Graham  MRN: [id number]  [place] 1965  Date of evaluation: 3/1/2022  Provider: ZION Glez        HISTORY OF PRESENT ILLNESS    Merary Graham is a 59 y.o. female patient. Patient received on signout at 1800 pending disposition. Please refer to signout PAs note for full HPI. REVIEW OF SYSTEMS       Review of Systems   Constitutional: Negative for chills and fever. HENT: Negative for congestion. Eyes: Negative for photophobia. Respiratory: Positive for cough and shortness of breath. Cardiovascular: Negative for chest pain. Gastrointestinal: Negative for abdominal pain, diarrhea, nausea and vomiting. Genitourinary: Negative for difficulty urinating. Musculoskeletal: Negative for myalgias. Neurological: Positive for syncope. Negative for headaches. Psychiatric/Behavioral: Negative for confusion. Except as noted above the remainder of the review of systems was reviewed and negative.        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    24 hour urine studies and aldosterone test negative     Chronic pain     Deep venous thrombosis ([address] 2011    Depression     Essential hypertension     Fibromyalgia     GERD (gastroesophageal reflux disease)     Heat intolerance     Hip pain     History of craniotomy     CHIARI MALFORMATION[place], [place]    Hyperlipidemia 2009    IBS (irritable bowel syndrome)     Meralgia paraesthetica     Migraine headache     Obesity     Osteopenia     Pedal edema     Sleep apnea     Umbilical hernia     Uterine fibroid          SURGICAL HISTORY       Past Surgical History:   Procedure Laterality Date     SECTION      x3    COLONOSCOPY  2013    normal    CRANIOTOMY  [address] [place], [place]    FIBULA FRACTURE SURGERY   ORIF right tibia and fibula     ORIF FEMUR DECOMPRESSION      right    PATELLA SURGERY  2007    partial resection right patella     TONSILLECTOMY      UPPER GASTROINTESTINAL ENDOSCOPY  4/2013         CURRENT MEDICATIONS       Discharge Medication List as of 3/1/2022  7:48 PM      CONTINUE these medications which have NOT CHANGED    Details   furosemide (LASIX) 20 MG tablet Take 20 mg by mouth dailyHistorical Med      clotrimazole-betamethasone (LOTRISONE) 1-0.05 % cream APPLY TO AFFECTED AREA TOPICALLY TWICE DAILY, Historical Med      diclofenac (VOLTAREN) 75 MG EC tablet Take 75 mg by mouth 2 times dailyHistorical Med      lidocaine 4 % external patch Place 1 patch onto the skin daily as needed, TransDERmal, DAILY PRN Starting Sat 1/1/2022, Historical Med      traMADol (ULTRAM) 50 MG tablet TAKE 1 TABLET BY MOUTH EVERY 6 HOURS AS NEEDEDHistorical Med      traZODone (DESYREL) 50 MG tablet Take 50 mg by mouth nightlyHistorical Med      fluticasone (FLOVENT HFA) 220 MCG/ACT inhaler Inhale 1 puff into the lungs 2 times dailyHistorical Med      budesonide-formoterol (SYMBICORT) 80-4.5 MCG/ACT AERO Inhale 2 puffs into the lungs 2 times dailyHistorical Med      albuterol (PROVENTIL) (2.5 MG/3ML) 0.083% nebulizer solution Take 2.5 mg by nebulization every 6 hours as needed for WheezingHistorical Med      sertraline (ZOLOFT) 100 MG tablet Take 1 tablet by mouth daily, Disp-30 tablet, R-3Normal      tiZANidine (ZANAFLEX) 4 MG tablet Take 4 mg by mouth every 8 hours as neededHistorical Med      rivaroxaban (XARELTO) 20 MG TABS tablet Take 20 mg by mouth Daily with supperHistorical Med      pantoprazole sodium (PROTONIX) 40 MG PACK packet Take 40 mg by mouth every morning (before breakfast)Historical Med      propranolol (INDERAL) 40 MG tablet Take 40 mg by mouth 2 times dailyHistorical Med      !! topiramate (TOPAMAX) 100 MG tablet Take 100 mg by mouth 2 times dailyHistorical Med      !! topiramate (TOPAMAX) 25 MG tablet Take 25 mg by mouth 2 times dailyHistorical Med      gabapentin (NEURONTIN) 800 MG tablet Take 1 tablet by mouth 3 times daily, Disp-270 tablet, R-1      ketoconazole (NIZORAL) 2 % cream Apply topically daily Apply topically daily. Skin folds and under breasts, Topical, DAILY, Historical Med      albuterol sulfate HFA (PROVENTIL HFA) 108 (90 BASE) MCG/ACT inhaler Inhale 2 puffs into the lungs every 6 hours as needed for Wheezing, Disp-3 Inhaler, R-3Dispense as \"Pro Air\"Normal      LYRICA 225 MG capsule Take 1 capsule by mouth 2 times daily, R-3, MARKO      nortriptyline (PAMELOR) 25 MG capsule Take 50 mg by mouth nightly      potassium chloride SA (K-DUR;KLOR-CON M) 20 MEQ tablet Take 1 tablet by mouth daily, Disp-90 tablet, R-3      triamterene-hydrochlorothiazide (MAXZIDE-25) 37.5-25 MG per tablet Take 1 tablet by mouth daily, Disp-90 tablet, R-3      levothyroxine (LEVOTHROID) 75 MCG tablet Take 1 tablet by mouth daily, Disp-90 tablet, R-3      rosuvastatin (CRESTOR) 40 MG tablet Take 1 tablet by mouth every evening, Disp-90 tablet, R-3      DULoxetine (CYMBALTA) 60 MG capsule Take 1 capsule by mouth 2 times daily, Disp-180 capsule, R-3      dicyclomine (BENTYL) 20 MG tablet Take 1 tablet by mouth three times daily, Disp-270 tablet, R-3       !! - Potential duplicate medications found. Please discuss with provider. ALLERGIES     Latex, Aspirin, Niaspan [niacin er], and Simvastatin    FAMILY HISTORY       Family History   Problem Relation Age of Onset    Other Father         ALS    Cancer Maternal Grandmother     Cancer Maternal Grandfather     Cancer Paternal Grandmother     Cancer Paternal Grandfather           SOCIAL HISTORY       Social History     Socioeconomic History    Marital status:       Spouse name: Not on file    Number of children: Not on file    Years of education: Not on file    Highest education level: Not on file   Occupational History    Not on file   Tobacco Use    Smoking status: Current Every Day Smoker     Packs/day: 0.25     Years: 40.00     Pack years: 10.00     Types: Cigarettes     Last attempt to quit: 2016     Years since quittin.7    Smokeless tobacco: Never Used   Substance and Sexual Activity    Alcohol use: No     Alcohol/week: 0.0 standard drinks    Drug use: No    Sexual activity: Not on file   Other Topics Concern    Not on file   Social History Narrative    Not on file     Social Determinants of Health     Financial Resource Strain: High Risk    Difficulty of Paying Living Expenses: Very hard   Food Insecurity: Food Insecurity Present    Worried About Running Out of Food in the Last Year: Sometimes true    Paula of Food in the Last Year: Sometimes true   Transportation Needs: Unmet Transportation Needs    Lack of Transportation (Medical):  Yes    Lack of Transportation (Non-Medical): Yes   Physical Activity:     Days of Exercise per Week: Not on file    Minutes of Exercise per Session: Not on file   Stress:     Feeling of Stress : Not on file   Social Connections:     Frequency of Communication with Friends and Family: Not on file    Frequency of Social Gatherings with Friends and Family: Not on file    Attends Sikhism Services: Not on file    Active Member of [address] [place] [address] [employer] or Organizations: Not on file    Attends Club or Organization Meetings: Not on file    Marital Status: Not on file   Intimate Partner Violence:     Fear of Current or Ex-Partner: Not on file    Emotionally Abused: Not on file    Physically Abused: Not on file    Sexually Abused: Not on file   Housing Stability:     Unable to Pay for Housing in the Last Year: Not on file    Number of [place] in the Last Year: Not on file    Unstable Housing in the Last Year: Not on file         PHYSICAL EXAM        ED Triage Vitals [22 1646]   BP Temp Temp Source Pulse Resp SpO2 Height Weight   116/78 98.6 °F (37 °C) Oral 78 22 95 % 5' 5\" (1.651 m) 200 lb (90.7 kg) Physical Exam  Constitutional:       General: She is not in acute distress. Appearance: Normal appearance. HENT:      Head: Normocephalic and atraumatic. Right Ear: External ear normal.      Left Ear: External ear normal.      Nose: Nose normal.      Mouth/Throat:      Mouth: Mucous membranes are moist.      Pharynx: Oropharynx is clear. Eyes:      Extraocular Movements: Extraocular movements intact. Cardiovascular:      Rate and Rhythm: Normal rate and regular rhythm. Pulmonary:      Effort: Pulmonary effort is normal. No tachypnea, accessory muscle usage or respiratory distress. Breath sounds: Examination of the left-upper field reveals wheezing. Examination of the left-middle field reveals wheezing. Examination of the right-lower field reveals wheezing. Examination of the left-lower field reveals wheezing. Wheezing present. Comments: No respiratory distress, no increased work of breathing, she is speaking in full sentences no difficulty  Chest:      Chest wall: No tenderness. Abdominal:      General: Bowel sounds are normal.      Palpations: Abdomen is soft. Tenderness: There is no abdominal tenderness. Musculoskeletal:         General: Normal range of motion. Cervical back: Normal range of motion. Right lower leg: No tenderness. No edema. Left lower leg: No tenderness. No edema. Skin:     General: Skin is warm. Neurological:      General: No focal deficit present. Mental Status: She is alert and oriented to person, place, and time. Psychiatric:         Mood and Affect: Mood is anxious.          Behavior: Behavior normal.           LABS:  Labs Reviewed   COMPREHENSIVE METABOLIC PANEL - Abnormal; Notable for the following components:       Result Value    Potassium 3.1 (*)     All other components within normal limits   CBC WITH AUTO DIFFERENTIAL - Abnormal; Notable for the following components:    MCH 31.4 (*)     RDW 16.4 (*)     Monocytes Absolute 1.0 (*)     All other components within normal limits   URINALYSIS WITH REFLEX TO CULTURE - Abnormal; Notable for the following components:    Leukocyte Esterase, Urine SMALL (*)     All other components within normal limits   MICROSCOPIC URINALYSIS - Abnormal; Notable for the following components:    Bacteria, UA RARE (*)     WBC, UA 6-9 (*)     RBC, UA 3-5 (*)     All other components within normal limits   COVID-19, RAPID   CULTURE, BLOOD 2   CULTURE, BLOOD 1   TROPONIN   CK   LACTIC ACID         MDM:   Vitals:    Vitals:    03/01/22 1745 03/01/22 1800 03/01/22 1830 03/01/22 1900   BP: 110/65 (!) 124/114 115/65 125/75   Pulse: 67 85 75 90   Resp: 12 20 18 20   Temp:       TempSrc:       SpO2:  96% 97% 95%   Weight:       Height:           EKG shows normal sinus rhythm 75 bpm there is T wave inversions in leads V1, V2, V3 no ventricular ectopy  ms      Patient received on signout at 1800. Patient being worked up primarily for syncopal event. On chart review note patient has history of this with negative work-up findings. Patient states that anxiety contributes to these episodes, states she has had severe stress recently with moving to the area and having difficulty with logistics with that. Patient also has asthma, was reported to be short of breath on arrival, she was placed on oxygen for comfort but on this PAs initial assessment she is taken off of oxygen and maintains 95% O2 saturation without any distress or increased work of breathing. She does have diffuse wheezing, states she was seen by cardiologist earlier today and prescribed antibiotic that she was not able to , states it was not the pharmacy. Chest x-ray concerning for atelectasis versus infiltrate findings. She will be given p.o. prednisone and p.o. doxycycline for treatment. She states she feels much better regarding this. In regards to her syncopal episode, there are no focal deficits on examination.   This PA spends extended amount of time with patient, helping her manage her anxiety and listening to concerns that she has. She is appropriate in conversation. Work-up was unremarkable, no cardiac arrhythmias noted, no troponin or CK elevation, no lactic elevation. She will be discharged with instruction for outpatient follow-up, or return to the ED for new or worse symptoms. She demonstrates understanding of plan and ambulates from the ED without difficulty. She is tolerating p.o. intake. CRITICAL CARE TIME   Total CriticalCare time was 0 minutes, excluding separately reportable procedures. There was a high probability of clinically significant/life threatening deterioration in the patient's condition which required my urgent intervention. PROCEDURES:  Unlessotherwise noted below, none      Procedures      FINAL IMPRESSION      1. COPD exacerbation ([facility]    2. Bronchitis    3.  Syncope and collapse          DISPOSITION/PLAN   DISPOSITION Decision To Discharge 03/01/2022 07:47:24 PM          ZION Green (electronically signed)  Attending Emergency Physician          Nisha Greenma  03/02/22 7945

## 2022-03-03 LAB
EKG ATRIAL RATE: 75 BPM
EKG P AXIS: 62 DEGREES
EKG P-R INTERVAL: 164 MS
EKG Q-T INTERVAL: 394 MS
EKG QRS DURATION: 82 MS
EKG QTC CALCULATION (BAZETT): 439 MS
EKG R AXIS: 18 DEGREES
EKG T AXIS: 42 DEGREES
EKG VENTRICULAR RATE: 75 BPM

## 2022-03-06 LAB
BLOOD CULTURE, ROUTINE: NORMAL
CULTURE, BLOOD 2: NORMAL

## 2022-04-05 ENCOUNTER — OFFICE VISIT (OUTPATIENT)
Dept: FAMILY MEDICINE CLINIC | Age: 57
End: 2022-04-05
Payer: MEDICARE

## 2022-04-05 VITALS
HEIGHT: 65 IN | OXYGEN SATURATION: 95 % | TEMPERATURE: 96.6 F | HEART RATE: 88 BPM | BODY MASS INDEX: 33.66 KG/M2 | DIASTOLIC BLOOD PRESSURE: 80 MMHG | WEIGHT: 202 LBS | SYSTOLIC BLOOD PRESSURE: 118 MMHG | RESPIRATION RATE: 11 BRPM

## 2022-04-05 DIAGNOSIS — L08.9: ICD-10-CM

## 2022-04-05 DIAGNOSIS — S60.418A: ICD-10-CM

## 2022-04-05 DIAGNOSIS — S60.131A CONTUSION OF RIGHT MIDDLE FINGER WITH DAMAGE TO NAIL, INITIAL ENCOUNTER: Primary | ICD-10-CM

## 2022-04-05 PROCEDURE — 99213 OFFICE O/P EST LOW 20 MIN: CPT | Performed by: PHYSICIAN ASSISTANT

## 2022-04-05 RX ORDER — CEPHALEXIN 500 MG/1
500 CAPSULE ORAL 2 TIMES DAILY
Qty: 20 CAPSULE | Refills: 0 | Status: SHIPPED | OUTPATIENT
Start: 2022-04-05 | End: 2022-06-02 | Stop reason: ALTCHOICE

## 2022-04-05 RX ORDER — TIOTROPIUM BROMIDE 18 UG/1
CAPSULE ORAL; RESPIRATORY (INHALATION)
COMMUNITY
Start: 2022-03-07 | End: 2022-09-13

## 2022-04-05 RX ORDER — AZITHROMYCIN 500 MG/1
TABLET, FILM COATED ORAL
COMMUNITY
Start: 2022-03-01 | End: 2022-06-02 | Stop reason: ALTCHOICE

## 2022-04-05 RX ORDER — PANTOPRAZOLE SODIUM 40 MG/1
TABLET, DELAYED RELEASE ORAL
COMMUNITY
Start: 2022-03-07

## 2022-04-05 NOTE — PROGRESS NOTES
Subjective  [place] Carmen, 59 y.o. female presents today with:  Chief Complaint   Patient presents with    Frostbite     pt presents for frost bite on her finger that is turning color              Lab Results   Component Value Date    LABA1C 6.3 (H) 2022     Lab Results   Component Value Date    CREATININE 0.86 2022     Lab Results   Component Value Date    ALT 23 2022    AST 19 2022     Lab Results   Component Value Date    CHOL 319 (H) 2016    TRIG 725 (H) 2016    HDL 34 (L) 2016    LDLCALC see below 2016    LDLDIRECT 201 (H) 2016          HPI  C.o trauma to right middle finger hitting it aganst the wall ans slamming removed  ic from her windhshiedl with bare heads hands turned purple   now hands turned cold    Review of Systems      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    24 hour urine studies and aldosterone test negative     Chronic pain     Deep venous thrombosis ([address] 2011    Depression     Essential hypertension     Fibromyalgia     GERD (gastroesophageal reflux disease)     Heat intolerance     Hip pain     History of craniotomy     CHIARI MALFORMATION[place], [place]    Hyperlipidemia 2009    IBS (irritable bowel syndrome)     Meralgia paraesthetica     Migraine headache     Obesity     Osteopenia     Pedal edema     Sleep apnea     Umbilical hernia     Uterine fibroid      Past Surgical History:   Procedure Laterality Date     SECTION      x3    COLONOSCOPY  2013    normal    CRANIOTOMY  2007    CHIARI MALFORMATION [place]    FIBULA FRACTURE SURGERY  2009    ORIF right tibia and fibula     ORIF FEMUR DECOMPRESSION      right    PATELLA SURGERY  2007    partial resection right patella     TONSILLECTOMY      UPPER GASTROINTESTINAL ENDOSCOPY  2013     Social History     Socioeconomic History    Marital status:       Spouse name: Not on file    Number of children: Not on file    Years of education: Not on file    Highest education level: Not on file   Occupational History    Not on file   Tobacco Use    Smoking status: Current Every Day Smoker     Packs/day: 0.25     Years: 40.00     Pack years: 10.00     Types: Cigarettes     Last attempt to quit: 2016     Years since quittin.8    Smokeless tobacco: Never Used   Substance and Sexual Activity    Alcohol use: No     Alcohol/week: 0.0 standard drinks    Drug use: No    Sexual activity: Not on file   Other Topics Concern    Not on file   Social History Narrative    Not on file     Social Determinants of Health     Financial Resource Strain: High Risk    Difficulty of Paying Living Expenses: Very hard   Food Insecurity: Food Insecurity Present    Worried About Running Out of Food in the Last Year: Sometimes true    Paula of Food in the Last Year: Sometimes true   Transportation Needs: Unmet Transportation Needs    Lack of Transportation (Medical):  Yes    Lack of Transportation (Non-Medical): Yes   Physical Activity:     Days of Exercise per Week: Not on file    Minutes of Exercise per Session: Not on file   Stress:     Feeling of Stress : Not on file   Social Connections:     Frequency of Communication with Friends and Family: Not on file    Frequency of Social Gatherings with Friends and Family: Not on file    Attends Denominational Services: Not on file    Active Member of Clubs or Organizations: Not on file    Attends Club or Organization Meetings: Not on file    Marital Status: Not on file   Intimate Partner Violence:     Fear of Current or Ex-Partner: Not on file    Emotionally Abused: Not on file    Physically Abused: Not on file    Sexually Abused: Not on file   Housing Stability:     Unable to Pay for Housing in the Last Year: Not on file    Number of [place] in the Last Year: Not on file    Unstable Housing in the Last Year: Not on file Family History   Problem Relation Age of Onset    Other Father         ALS    Cancer Maternal Grandmother     Cancer Maternal Grandfather     Cancer Paternal Grandmother     Cancer Paternal Grandfather      Allergies   Allergen Reactions    Latex Itching     bruising    Aspirin     Niaspan [Niacin Er]      Itching      Simvastatin         Current Outpatient Medications   Medication Sig Dispense Refill    SPIRIVA HANDIHALER 18 MCG inhalation capsule INHALE THE CONTENTS OF ONE (1) CAPSULE BY MOUTH VIA HANDIHALER ONCE DAILY AS DIRECTED      pantoprazole (PROTONIX) 40 MG tablet TAKE 1 TABLET BY MOUTH DAILY      azithromycin (ZITHROMAX) 500 MG tablet Take by mouth      cephALEXin (KEFLEX) 500 MG capsule Take 1 capsule by mouth 2 times daily 20 capsule 0    furosemide (LASIX) 20 MG tablet Take 20 mg by mouth daily      clotrimazole-betamethasone (LOTRISONE) 1-0.05 % cream APPLY TO AFFECTED AREA TOPICALLY TWICE DAILY      diclofenac (VOLTAREN) 75 MG EC tablet Take 75 mg by mouth 2 times daily      lidocaine 4 % external patch Place 1 patch onto the skin daily as needed      traMADol (ULTRAM) 50 MG tablet TAKE 1 TABLET BY MOUTH EVERY 6 HOURS AS NEEDED      traZODone (DESYREL) 50 MG tablet Take 50 mg by mouth nightly      fluticasone (FLOVENT HFA) 220 MCG/ACT inhaler Inhale 1 puff into the lungs 2 times daily      budesonide-formoterol (SYMBICORT) 80-4.5 MCG/ACT AERO Inhale 2 puffs into the lungs 2 times daily      albuterol (PROVENTIL) (2.5 MG/3ML) 0.083% nebulizer solution Take 2.5 mg by nebulization every 6 hours as needed for Wheezing      sertraline (ZOLOFT) 100 MG tablet Take 1 tablet by mouth daily 30 tablet 3    tiZANidine (ZANAFLEX) 4 MG tablet Take 4 mg by mouth every 8 hours as needed      rivaroxaban (XARELTO) 20 MG TABS tablet Take 20 mg by mouth Daily with supper      pantoprazole sodium (PROTONIX) 40 MG PACK packet Take 40 mg by mouth every morning (before breakfast)      propranolol (INDERAL) 40 MG tablet Take 40 mg by mouth 2 times daily      topiramate (TOPAMAX) 100 MG tablet Take 100 mg by mouth 2 times daily      topiramate (TOPAMAX) 25 MG tablet Take 25 mg by mouth 2 times daily      gabapentin (NEURONTIN) 800 MG tablet Take 1 tablet by mouth 3 times daily 270 tablet 1    ketoconazole (NIZORAL) 2 % cream Apply topically daily Apply topically daily. Skin folds and under breasts      LYRICA 225 MG capsule Take 1 capsule by mouth 2 times daily  3    nortriptyline (PAMELOR) 25 MG capsule Take 50 mg by mouth nightly      potassium chloride SA (K-DUR;KLOR-CON M) 20 MEQ tablet Take 1 tablet by mouth daily (Patient taking differently: Take 20 mEq by mouth 2 times daily ) 90 tablet 3    triamterene-hydrochlorothiazide (MAXZIDE-25) 37.5-25 MG per tablet Take 1 tablet by mouth daily (Patient taking differently: Take 1 tablet by mouth at bedtime ) 90 tablet 3    levothyroxine (LEVOTHROID) 75 MCG tablet Take 1 tablet by mouth daily 90 tablet 3    rosuvastatin (CRESTOR) 40 MG tablet Take 1 tablet by mouth every evening 90 tablet 3    DULoxetine (CYMBALTA) 60 MG capsule Take 1 capsule by mouth 2 times daily (Patient taking differently: Take 60 mg by mouth daily ) 180 capsule 3    dicyclomine (BENTYL) 20 MG tablet Take 1 tablet by mouth three times daily (Patient taking differently: Take 20 mg by mouth 3 times daily as needed ) 270 tablet 3    albuterol sulfate HFA (PROVENTIL HFA) 108 (90 BASE) MCG/ACT inhaler Inhale 2 puffs into the lungs every 6 hours as needed for Wheezing 3 Inhaler 3     No current facility-administered medications for this visit. Objective    Vitals:    04/05/22 0845   BP: 118/80   Pulse: 88   Resp: 11   Temp: 96.6 °F (35.9 °C)   TempSrc: Temporal   SpO2: 95%   Weight: 202 lb (91.6 kg)   Height: 5' 5\" (1.651 m)     Physical Exam  Eyes:      Extraocular Movements: Extraocular movements intact.       Conjunctiva/sclera: Conjunctivae normal.      Pupils: Pupils are equal, round, and reactive to light. Pulmonary:      Effort: Pulmonary effort is normal. No respiratory distress. Breath sounds: No wheezing. Musculoskeletal:      Comments: Bruising nail right middle finger  Dried discharge distal middl efinger   Neurological:      General: No focal deficit present. Mental Status: She is alert and oriented to person, place, and time. Assessment & Plan    Diagnosis Orders   1. Contusion of right middle finger with damage to nail, initial encounter  cephALEXin (KEFLEX) 500 MG capsule   2. Abrasion of middle finger with infection  XR HAND RIGHT (2 VIEWS)         Orders Placed This Encounter   Procedures    XR HAND RIGHT (2 VIEWS)     Standing Status:   Future     Standing Expiration Date:   4/5/2023     Orders Placed This Encounter   Medications    cephALEXin (KEFLEX) 500 MG capsule     Sig: Take 1 capsule by mouth 2 times daily     Dispense:  20 capsule     Refill:  0     There are no discontinued medications. Return if symptoms worsen or fail to improve.     Simin Miles PA-C

## 2022-04-21 ENCOUNTER — TELEPHONE (OUTPATIENT)
Dept: FAMILY MEDICINE CLINIC | Age: 57
End: 2022-04-21

## 2022-04-21 NOTE — TELEPHONE ENCOUNTER
Pt called in and stated that she finished all her meds but her finger is still not healing, It is still swollen, bleeding and painful and the nail is coming off.

## 2022-04-22 NOTE — TELEPHONE ENCOUNTER
Suggested patient apply antibacterial ointment in the mean time. Pt made aware of ZION Miles out of office. Please advise, thank you.

## 2022-04-28 ENCOUNTER — APPOINTMENT (OUTPATIENT)
Dept: CT IMAGING | Age: 57
End: 2022-04-28
Payer: MEDICARE

## 2022-04-28 ENCOUNTER — OFFICE VISIT (OUTPATIENT)
Dept: FAMILY MEDICINE CLINIC | Age: 57
End: 2022-04-28
Payer: MEDICARE

## 2022-04-28 ENCOUNTER — HOSPITAL ENCOUNTER (EMERGENCY)
Age: 57
Discharge: HOME | End: 2022-04-28
Payer: MEDICARE

## 2022-04-28 VITALS
WEIGHT: 200 LBS | TEMPERATURE: 96.5 F | OXYGEN SATURATION: 96 % | BODY MASS INDEX: 33.32 KG/M2 | HEART RATE: 56 BPM | SYSTOLIC BLOOD PRESSURE: 108 MMHG | HEIGHT: 65 IN | DIASTOLIC BLOOD PRESSURE: 72 MMHG

## 2022-04-28 VITALS
SYSTOLIC BLOOD PRESSURE: 101 MMHG | HEIGHT: 65 IN | DIASTOLIC BLOOD PRESSURE: 58 MMHG | WEIGHT: 200 LBS | TEMPERATURE: 98.2 F | BODY MASS INDEX: 33.32 KG/M2 | OXYGEN SATURATION: 93 % | HEART RATE: 72 BPM | RESPIRATION RATE: 13 BRPM

## 2022-04-28 DIAGNOSIS — J44.1 COPD EXACERBATION (HCC): ICD-10-CM

## 2022-04-28 DIAGNOSIS — R06.02 SHORTNESS OF BREATH: ICD-10-CM

## 2022-04-28 DIAGNOSIS — R05.9 COUGH: Primary | ICD-10-CM

## 2022-04-28 DIAGNOSIS — Z87.01 HISTORY OF PNEUMONIA: ICD-10-CM

## 2022-04-28 LAB
ALBUMIN SERPL-MCNC: 4 G/DL (ref 3.5–4.6)
ALP BLD-CCNC: 123 U/L (ref 40–130)
ALT SERPL-CCNC: 26 U/L (ref 0–33)
ANION GAP SERPL CALCULATED.3IONS-SCNC: 13 MEQ/L (ref 9–15)
AST SERPL-CCNC: 27 U/L (ref 0–35)
BASOPHILS ABSOLUTE: 0 K/UL (ref 0–0.2)
BASOPHILS RELATIVE PERCENT: 1 %
BILIRUB SERPL-MCNC: <0.2 MG/DL (ref 0.2–0.7)
BUN BLDV-MCNC: 22 MG/DL (ref 6–20)
CALCIUM SERPL-MCNC: 9.1 MG/DL (ref 8.5–9.9)
CHLORIDE BLD-SCNC: 108 MEQ/L (ref 95–107)
CO2: 25 MEQ/L (ref 20–31)
CREAT SERPL-MCNC: 1.26 MG/DL (ref 0.5–0.9)
EOSINOPHILS ABSOLUTE: 0 K/UL (ref 0–0.7)
EOSINOPHILS RELATIVE PERCENT: 0.1 %
GFR AFRICAN AMERICAN: 52.5
GFR AFRICAN AMERICAN: 56
GFR NON-AFRICAN AMERICAN: 43.4
GFR NON-AFRICAN AMERICAN: 46
GLOBULIN: 2.7 G/DL (ref 2.3–3.5)
GLUCOSE BLD-MCNC: 106 MG/DL (ref 70–99)
HCT VFR BLD CALC: 38.7 % (ref 37–47)
HEMOGLOBIN: 13.1 G/DL (ref 12–16)
INFLUENZA A BY PCR: NEGATIVE
INFLUENZA B BY PCR: NEGATIVE
LACTIC ACID: 0.7 MMOL/L (ref 0.5–2.2)
LYMPHOCYTES ABSOLUTE: 2.3 K/UL (ref 1–4.8)
LYMPHOCYTES RELATIVE PERCENT: 51.2 %
Lab: NORMAL
MAGNESIUM: 2.6 MG/DL (ref 1.7–2.4)
MCH RBC QN AUTO: 31.8 PG (ref 27–31.3)
MCHC RBC AUTO-ENTMCNC: 33.9 % (ref 33–37)
MCV RBC AUTO: 93.9 FL (ref 82–100)
MONOCYTES ABSOLUTE: 0.3 K/UL (ref 0.2–0.8)
MONOCYTES RELATIVE PERCENT: 6.9 %
NEUTROPHILS ABSOLUTE: 1.8 K/UL (ref 1.4–6.5)
NEUTROPHILS RELATIVE PERCENT: 40.8 %
PDW BLD-RTO: 15.3 % (ref 11.5–14.5)
PERFORMED ON: ABNORMAL
PERFORMING INSTRUMENT: NORMAL
PLATELET # BLD: 242 K/UL (ref 130–400)
POC CREATININE WHOLE BLOOD: 1.2
POC CREATININE: 1.2 MG/DL (ref 0.6–1.2)
POC SAMPLE TYPE: ABNORMAL
POTASSIUM SERPL-SCNC: 3.9 MEQ/L (ref 3.4–4.9)
PRO-BNP: 94 PG/ML
QC PASS/FAIL: NORMAL
RBC # BLD: 4.12 M/UL (ref 4.2–5.4)
SARS-COV-2, NAAT: NOT DETECTED
SARS-COV-2, POC: NORMAL
SODIUM BLD-SCNC: 146 MEQ/L (ref 135–144)
TOTAL PROTEIN: 6.7 G/DL (ref 6.3–8)
TROPONIN: <0.01 NG/ML (ref 0–0.01)
WBC # BLD: 4.5 K/UL (ref 4.8–10.8)

## 2022-04-28 PROCEDURE — 93005 ELECTROCARDIOGRAM TRACING: CPT | Performed by: STUDENT IN AN ORGANIZED HEALTH CARE EDUCATION/TRAINING PROGRAM

## 2022-04-28 PROCEDURE — 87635 SARS-COV-2 COVID-19 AMP PRB: CPT

## 2022-04-28 PROCEDURE — 6370000000 HC RX 637 (ALT 250 FOR IP): Performed by: STUDENT IN AN ORGANIZED HEALTH CARE EDUCATION/TRAINING PROGRAM

## 2022-04-28 PROCEDURE — 87502 INFLUENZA DNA AMP PROBE: CPT

## 2022-04-28 PROCEDURE — 71275 CT ANGIOGRAPHY CHEST: CPT

## 2022-04-28 PROCEDURE — 96374 THER/PROPH/DIAG INJ IV PUSH: CPT

## 2022-04-28 PROCEDURE — 83880 ASSAY OF NATRIURETIC PEPTIDE: CPT

## 2022-04-28 PROCEDURE — 94760 N-INVAS EAR/PLS OXIMETRY 1: CPT

## 2022-04-28 PROCEDURE — 83735 ASSAY OF MAGNESIUM: CPT

## 2022-04-28 PROCEDURE — 87426 SARSCOV CORONAVIRUS AG IA: CPT | Performed by: PHYSICIAN ASSISTANT

## 2022-04-28 PROCEDURE — 6360000004 HC RX CONTRAST MEDICATION: Performed by: STUDENT IN AN ORGANIZED HEALTH CARE EDUCATION/TRAINING PROGRAM

## 2022-04-28 PROCEDURE — 80053 COMPREHEN METABOLIC PANEL: CPT

## 2022-04-28 PROCEDURE — 2580000003 HC RX 258: Performed by: STUDENT IN AN ORGANIZED HEALTH CARE EDUCATION/TRAINING PROGRAM

## 2022-04-28 PROCEDURE — 94640 AIRWAY INHALATION TREATMENT: CPT

## 2022-04-28 PROCEDURE — 6360000002 HC RX W HCPCS: Performed by: STUDENT IN AN ORGANIZED HEALTH CARE EDUCATION/TRAINING PROGRAM

## 2022-04-28 PROCEDURE — 85025 COMPLETE CBC W/AUTO DIFF WBC: CPT

## 2022-04-28 PROCEDURE — 36415 COLL VENOUS BLD VENIPUNCTURE: CPT

## 2022-04-28 PROCEDURE — 99215 OFFICE O/P EST HI 40 MIN: CPT | Performed by: PHYSICIAN ASSISTANT

## 2022-04-28 PROCEDURE — 84484 ASSAY OF TROPONIN QUANT: CPT

## 2022-04-28 PROCEDURE — 83605 ASSAY OF LACTIC ACID: CPT

## 2022-04-28 PROCEDURE — 99285 EMERGENCY DEPT VISIT HI MDM: CPT

## 2022-04-28 RX ORDER — GUAIFENESIN 600 MG/1
600 TABLET, EXTENDED RELEASE ORAL 2 TIMES DAILY
Qty: 30 TABLET | Refills: 0 | Status: SHIPPED | OUTPATIENT
Start: 2022-04-28 | End: 2022-05-13

## 2022-04-28 RX ORDER — IPRATROPIUM BROMIDE AND ALBUTEROL SULFATE 2.5; .5 MG/3ML; MG/3ML
1 SOLUTION RESPIRATORY (INHALATION) ONCE
Status: COMPLETED | OUTPATIENT
Start: 2022-04-28 | End: 2022-04-28

## 2022-04-28 RX ORDER — PREDNISONE 50 MG/1
50 TABLET ORAL DAILY
Qty: 5 TABLET | Refills: 0 | Status: SHIPPED | OUTPATIENT
Start: 2022-04-28 | End: 2022-05-03

## 2022-04-28 RX ORDER — DOXYCYCLINE HYCLATE 100 MG
100 TABLET ORAL 2 TIMES DAILY
Qty: 20 TABLET | Refills: 0 | Status: SHIPPED | OUTPATIENT
Start: 2022-04-28 | End: 2022-05-08

## 2022-04-28 RX ORDER — 0.9 % SODIUM CHLORIDE 0.9 %
500 INTRAVENOUS SOLUTION INTRAVENOUS ONCE
Status: COMPLETED | OUTPATIENT
Start: 2022-04-28 | End: 2022-04-28

## 2022-04-28 RX ORDER — METHYLPREDNISOLONE SODIUM SUCCINATE 125 MG/2ML
125 INJECTION, POWDER, LYOPHILIZED, FOR SOLUTION INTRAMUSCULAR; INTRAVENOUS ONCE
Status: COMPLETED | OUTPATIENT
Start: 2022-04-28 | End: 2022-04-28

## 2022-04-28 RX ORDER — ACETAMINOPHEN 500 MG
1000 TABLET ORAL ONCE
Status: COMPLETED | OUTPATIENT
Start: 2022-04-28 | End: 2022-04-28

## 2022-04-28 RX ORDER — BENZONATATE 100 MG/1
100-200 CAPSULE ORAL 3 TIMES DAILY PRN
Qty: 60 CAPSULE | Refills: 0 | Status: SHIPPED | OUTPATIENT
Start: 2022-04-28 | End: 2022-05-08

## 2022-04-28 RX ADMIN — SODIUM CHLORIDE 500 ML: 9 INJECTION, SOLUTION INTRAVENOUS at 15:27

## 2022-04-28 RX ADMIN — IOPAMIDOL 100 ML: 612 INJECTION, SOLUTION INTRAVENOUS at 17:19

## 2022-04-28 RX ADMIN — IPRATROPIUM BROMIDE AND ALBUTEROL SULFATE 1 AMPULE: .5; 2.5 SOLUTION RESPIRATORY (INHALATION) at 16:46

## 2022-04-28 RX ADMIN — METHYLPREDNISOLONE SODIUM SUCCINATE 125 MG: 125 INJECTION, POWDER, FOR SOLUTION INTRAMUSCULAR; INTRAVENOUS at 15:28

## 2022-04-28 RX ADMIN — ACETAMINOPHEN 1000 MG: 500 TABLET ORAL at 16:02

## 2022-04-28 ASSESSMENT — PAIN DESCRIPTION - DESCRIPTORS: DESCRIPTORS: ACHING

## 2022-04-28 ASSESSMENT — ENCOUNTER SYMPTOMS
PHOTOPHOBIA: 0
WHEEZING: 0
VOMITING: 0
COUGH: 1
WHEEZING: 1
DIARRHEA: 0
SHORTNESS OF BREATH: 1
ABDOMINAL PAIN: 0
CHEST TIGHTNESS: 1
COUGH: 1
SHORTNESS OF BREATH: 1
CONSTIPATION: 0
NAUSEA: 0

## 2022-04-28 ASSESSMENT — PATIENT HEALTH QUESTIONNAIRE - PHQ9
1. LITTLE INTEREST OR PLEASURE IN DOING THINGS: 0
10. IF YOU CHECKED OFF ANY PROBLEMS, HOW DIFFICULT HAVE THESE PROBLEMS MADE IT FOR YOU TO DO YOUR WORK, TAKE CARE OF THINGS AT HOME, OR GET ALONG WITH OTHER PEOPLE: 0
SUM OF ALL RESPONSES TO PHQ QUESTIONS 1-9: 0
SUM OF ALL RESPONSES TO PHQ QUESTIONS 1-9: 0
7. TROUBLE CONCENTRATING ON THINGS, SUCH AS READING THE NEWSPAPER OR WATCHING TELEVISION: 0
9. THOUGHTS THAT YOU WOULD BE BETTER OFF DEAD, OR OF HURTING YOURSELF: 0
SUM OF ALL RESPONSES TO PHQ9 QUESTIONS 1 & 2: 0
SUM OF ALL RESPONSES TO PHQ QUESTIONS 1-9: 0
3. TROUBLE FALLING OR STAYING ASLEEP: 0
5. POOR APPETITE OR OVEREATING: 0
8. MOVING OR SPEAKING SO SLOWLY THAT OTHER PEOPLE COULD HAVE NOTICED. OR THE OPPOSITE, BEING SO FIGETY OR RESTLESS THAT YOU HAVE BEEN MOVING AROUND A LOT MORE THAN USUAL: 0
SUM OF ALL RESPONSES TO PHQ QUESTIONS 1-9: 0
6. FEELING BAD ABOUT YOURSELF - OR THAT YOU ARE A FAILURE OR HAVE LET YOURSELF OR YOUR FAMILY DOWN: 0
2. FEELING DOWN, DEPRESSED OR HOPELESS: 0
4. FEELING TIRED OR HAVING LITTLE ENERGY: 0

## 2022-04-28 ASSESSMENT — PAIN SCALES - GENERAL
PAINLEVEL_OUTOF10: 10
PAINLEVEL_OUTOF10: 10

## 2022-04-28 ASSESSMENT — PAIN - FUNCTIONAL ASSESSMENT: PAIN_FUNCTIONAL_ASSESSMENT: 0-10

## 2022-04-28 ASSESSMENT — PAIN DESCRIPTION - LOCATION: LOCATION: GENERALIZED

## 2022-04-28 ASSESSMENT — PAIN DESCRIPTION - PAIN TYPE: TYPE: ACUTE PAIN

## 2022-04-28 ASSESSMENT — PAIN DESCRIPTION - FREQUENCY: FREQUENCY: CONTINUOUS

## 2022-04-28 NOTE — ED PROVIDER NOTES
[address] [facility] ED  EMERGENCY DEPARTMENT ENCOUNTER      Pt Name: Amaury Waterman  MRN: [id number]  [place] 1965  Date of evaluation: 4/28/2022  Provider: Parris Jarrett PA-C    CHIEF COMPLAINT       Chief Complaint   Patient presents with    Illness         HISTORY OF PRESENT ILLNESS   (Location/Symptom, Timing/Onset, Context/Setting, Quality, Duration, Modifying Factors, Severity)  Note limiting factors. Amaury Waterman is a 59 y.o. female who presents to the emergency department for evaluation of cough. Patient states she has had a cough since March. She states she was diagnosed with pneumonia in the emergency department in March but never filled the prescription because she does not have insurance and cannot afford the medication. She went to the walk-in clinic prior to coming to the emergency department. She states she was told she needed to come to the emergency department for evaluation however per note from walk-in clinic patient was requesting transport to the emergency department. She was not hypoxic and hemodynamically stable in their office. She states cough is productive with yellow sputum. She is a smoker. She has not tried anything for her symptoms at home. There is some associated shortness of breath. She states she has had a fever however she states her temperature has only been as high as 98 she states \"this is a fever for me. \"  She denies sick contacts. She has been vaccinated for COVID and flu. She denies chills chest pain nausea vomiting diarrhea abdominal pain back pain dizziness syncope urinary symptoms. She has history of COPD. HPI    Nursing Notes were reviewed. REVIEW OF SYSTEMS    (2-9 systems for level 4, 10 or more for level 5)     Review of Systems   Constitutional: Negative for chills, fatigue and fever. HENT: Negative for congestion. Eyes: Negative for photophobia. Respiratory: Positive for cough and shortness of breath.  Negative for wheezing. Cardiovascular: Negative for chest pain and palpitations. Gastrointestinal: Negative for abdominal pain, constipation, diarrhea, nausea and vomiting. Genitourinary: Negative for dysuria, frequency and hematuria. Musculoskeletal: Negative for myalgias. Allergic/Immunologic: Negative for immunocompromised state. Neurological: Negative for dizziness, weakness and headaches. All other systems reviewed and are negative. Except as noted above the remainder of the review of systems was reviewed and negative.        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    24 hour urine studies and aldosterone test negative     Chronic pain     Deep venous thrombosis ([facility] [address] 2011    Depression     Essential hypertension     Fibromyalgia     GERD (gastroesophageal reflux disease)     Heat intolerance     Hip pain     History of craniotomy 2007    [place]    Hyperlipidemia 2009    IBS (irritable bowel syndrome)     Meralgia paraesthetica     Migraine headache     Obesity     Osteopenia     Pedal edema     Sleep apnea     Umbilical hernia     Uterine fibroid          SURGICAL HISTORY       Past Surgical History:   Procedure Laterality Date     SECTION      x3    COLONOSCOPY  2013    normal    CRANIOTOMY  2007    [place]I [place]FORMATION [place]    FIBULA FRACTURE SURGERY  2009    ORIF right tibia and fibula     ORIF FEMUR DECOMPRESSION      right    PATELLA SURGERY  2007    partial resection right patella     TONSILLECTOMY      UPPER GASTROINTESTINAL ENDOSCOPY  2013         CURRENT MEDICATIONS       Discharge Medication List as of 2022  6:03 PM      CONTINUE these medications which have NOT CHANGED    Details   SPIRIVA HANDIHALER 18 MCG inhalation capsule INHALE THE CONTENTS OF ONE (1) CAPSULE BY MOUTH VIA HANDIHALER ONCE DAILY AS DIRECTED, DAWHistorical Med      pantoprazole (PROTONIX) 40 MG tablet TAKE 1 TABLET BY MOUTH DAILYHistorical Med      azithromycin (ZITHROMAX) 500 MG tablet Take by mouthHistorical Med      cephALEXin (KEFLEX) 500 MG capsule Take 1 capsule by mouth 2 times daily, Disp-20 capsule, R-0Normal      furosemide (LASIX) 20 MG tablet Take 20 mg by mouth dailyHistorical Med      clotrimazole-betamethasone (LOTRISONE) 1-0.05 % cream APPLY TO AFFECTED AREA TOPICALLY TWICE DAILY, Historical Med      diclofenac (VOLTAREN) 75 MG EC tablet Take 75 mg by mouth 2 times dailyHistorical Med      lidocaine 4 % external patch Place 1 patch onto the skin daily as needed, TransDERmal, DAILY PRN Starting Sat 1/1/2022, Historical Med      traMADol (ULTRAM) 50 MG tablet TAKE 1 TABLET BY MOUTH EVERY 6 HOURS AS NEEDEDHistorical Med      traZODone (DESYREL) 50 MG tablet Take 50 mg by mouth nightlyHistorical Med      fluticasone (FLOVENT HFA) 220 MCG/ACT inhaler Inhale 1 puff into the lungs 2 times dailyHistorical Med      budesonide-formoterol (SYMBICORT) 80-4.5 MCG/ACT AERO Inhale 2 puffs into the lungs 2 times dailyHistorical Med      albuterol (PROVENTIL) (2.5 MG/3ML) 0.083% nebulizer solution Take 2.5 mg by nebulization every 6 hours as needed for WheezingHistorical Med      sertraline (ZOLOFT) 100 MG tablet Take 1 tablet by mouth daily, Disp-30 tablet, R-3Normal      tiZANidine (ZANAFLEX) 4 MG tablet Take 4 mg by mouth every 8 hours as neededHistorical Med      rivaroxaban (XARELTO) 20 MG TABS tablet Take 20 mg by mouth Daily with supperHistorical Med      pantoprazole sodium (PROTONIX) 40 MG PACK packet Take 40 mg by mouth every morning (before breakfast)Historical Med      propranolol (INDERAL) 40 MG tablet Take 40 mg by mouth 2 times dailyHistorical Med      !! topiramate (TOPAMAX) 100 MG tablet Take 100 mg by mouth 2 times dailyHistorical Med      !! topiramate (TOPAMAX) 25 MG tablet Take 25 mg by mouth 2 times dailyHistorical Med      gabapentin (NEURONTIN) 800 MG tablet Take 1 tablet by mouth 3 times daily, Disp-270 tablet, R-1      ketoconazole (NIZORAL) 2 % cream Apply topically daily Apply topically daily. Skin folds and under breasts, Topical, DAILY, Historical Med      albuterol sulfate HFA (PROVENTIL HFA) 108 (90 BASE) MCG/ACT inhaler Inhale 2 puffs into the lungs every 6 hours as needed for Wheezing, Disp-3 Inhaler, R-3Dispense as \"Pro Air\"Normal      LYRICA 225 MG capsule Take 1 capsule by mouth 2 times daily, R-3, MARKO      nortriptyline (PAMELOR) 25 MG capsule Take 50 mg by mouth nightly      potassium chloride SA (K-DUR;KLOR-CON M) 20 MEQ tablet Take 1 tablet by mouth daily, Disp-90 tablet, R-3      triamterene-hydrochlorothiazide (MAXZIDE-25) 37.5-25 MG per tablet Take 1 tablet by mouth daily, Disp-90 tablet, R-3      levothyroxine (LEVOTHROID) 75 MCG tablet Take 1 tablet by mouth daily, Disp-90 tablet, R-3      rosuvastatin (CRESTOR) 40 MG tablet Take 1 tablet by mouth every evening, Disp-90 tablet, R-3      DULoxetine (CYMBALTA) 60 MG capsule Take 1 capsule by mouth 2 times daily, Disp-180 capsule, R-3      dicyclomine (BENTYL) 20 MG tablet Take 1 tablet by mouth three times daily, Disp-270 tablet, R-3       !! - Potential duplicate medications found. Please discuss with provider. ALLERGIES     Latex, Aspirin, Niaspan [niacin er], and Simvastatin    FAMILY HISTORY       Family History   Problem Relation Age of Onset    Other Father         ALS    Cancer Maternal Grandmother     Cancer Maternal Grandfather     Cancer Paternal Grandmother     Cancer Paternal Grandfather           SOCIAL HISTORY       Social History     Socioeconomic History    Marital status:       Spouse name: Not on file    Number of children: Not on file    Years of education: Not on file    Highest education level: Not on file   Occupational History    Not on file   Tobacco Use    Smoking status: Current Every Day Smoker     Packs/day: 0.25     Years: 40.00 Pack years: 10.00     Types: Cigarettes     Last attempt to quit: 2016     Years since quittin.9    Smokeless tobacco: Never Used   Substance and Sexual Activity    Alcohol use: No     Alcohol/week: 0.0 standard drinks    Drug use: No    Sexual activity: Not on file   Other Topics Concern    Not on file   Social History Narrative    Not on file     Social Determinants of Health     Financial Resource Strain: High Risk    Difficulty of Paying Living Expenses: Very hard   Food Insecurity: Food Insecurity Present    Worried About Running Out of Food in the Last Year: Sometimes true    Paula of Food in the Last Year: Sometimes true   Transportation Needs: Unmet Transportation Needs    Lack of Transportation (Medical):  Yes    Lack of Transportation (Non-Medical): Yes   Physical Activity:     Days of Exercise per Week: Not on file    Minutes of Exercise per Session: Not on file   Stress:     Feeling of Stress : Not on file   Social Connections:     Frequency of Communication with Friends and Family: Not on file    Frequency of Social Gatherings with Friends and Family: Not on file    Attends Confucianism Services: Not on file    Active Member of [address] [place] [address] or Organizations: Not on file    Attends Club or Organization Meetings: Not on file    Marital Status: Not on file   Intimate Partner Violence:     Fear of Current or Ex-Partner: Not on file    Emotionally Abused: Not on file    Physically Abused: Not on file    Sexually Abused: Not on file   Housing Stability:     Unable to Pay for Housing in the Last Year: Not on file    Number of [place] in the Last Year: Not on file    Unstable Housing in the Last Year: Not on file       SCREENINGS         Rosalee Coma Scale  Eye Opening: Spontaneous  Best Verbal Response: Oriented  Best Motor Response: Obeys commands  Rosalee Coma Scale Score: 15                     CIWA Assessment  BP: (!) 101/58  Pulse: 72                 PHYSICAL EXAM    (up to 7 for level 4, 8 or more for level 5)     ED Triage Vitals [04/28/22 1439]   BP Temp Temp Source Pulse Resp SpO2 Height Weight   103/82 98.2 °F (36.8 °C) Oral 75 20 98 % 5' 5\" (1.651 m) 200 lb (90.7 kg)       Physical Exam  Constitutional:       General: She is not in acute distress. Appearance: She is well-developed. She is not toxic-appearing. HENT:      Head: Normocephalic and atraumatic. Nose: Nose normal.      Mouth/Throat:      Mouth: Mucous membranes are moist.   Eyes:      Pupils: Pupils are equal, round, and reactive to light. Cardiovascular:      Rate and Rhythm: Normal rate and regular rhythm. Heart sounds: No murmur heard. No friction rub. No gallop. Pulmonary:      Effort: Pulmonary effort is normal.      Breath sounds: Wheezing present. No rhonchi or rales. Abdominal:      General: Bowel sounds are normal. There is no distension. Palpations: Abdomen is soft. Tenderness: There is no abdominal tenderness. There is no guarding or rebound. Musculoskeletal:         General: No swelling. Cervical back: Normal range of motion. Right lower leg: No edema. Left lower leg: No edema. Skin:     General: Skin is warm and dry. Capillary Refill: Capillary refill takes less than 2 seconds. Findings: No rash. Neurological:      General: No focal deficit present. Mental Status: She is alert and oriented to person, place, and time. DIAGNOSTIC RESULTS     EKG: All EKG's are interpreted by the Emergency Department Physician who either signs or Co-signs this chart in the absence of a cardiologist.    EKG shows NSR with HR 65, normal axis, , no ST changes.         RADIOLOGY:   Non-plain film images such as CT, Ultrasound and MRI are read by the radiologist. Plain radiographic images are visualized and preliminarily interpreted by the emergency physician with the below findings:      Interpretation per the Radiologist below, if available at the time of this note:    CTA Chest W WO  (PE study)   Final Result   No CT evidence pulmonary embolism. All CT scans at this facility use dose modulation, iterative reconstruction, and/or weight based dosing when appropriate to reduce radiation dose to as low as reasonably achievable. ED BEDSIDE ULTRASOUND:   Performed by ED Physician - none    LABS:  Labs Reviewed   COMPREHENSIVE METABOLIC PANEL - Abnormal; Notable for the following components:       Result Value    Sodium 146 (*)     Chloride 108 (*)     Glucose 106 (*)     BUN 22 (*)     CREATININE 1.26 (*)     GFR Non- 43.4 (*)     GFR  52.5 (*)     All other components within normal limits   CBC WITH AUTO DIFFERENTIAL - Abnormal; Notable for the following components:    WBC 4.5 (*)     RBC 4.12 (*)     MCH 31.8 (*)     RDW 15.3 (*)     All other components within normal limits   MAGNESIUM - Abnormal; Notable for the following components:    Magnesium 2.6 (*)     All other components within normal limits   POCT VENOUS - Abnormal; Notable for the following components:    GFR Non- 46 (*)     GFR  56 (*)     All other components within normal limits   POCT CREATININE - URINE - Normal   COVID-19, RAPID   RAPID INFLUENZA A/B ANTIGENS   LACTIC ACID   TROPONIN   BRAIN NATRIURETIC PEPTIDE   URINALYSIS WITH REFLEX TO CULTURE       All other labs were within normal range or not returned as of this dictation. EMERGENCY DEPARTMENT COURSE and DIFFERENTIAL DIAGNOSIS/MDM:   Vitals:    Vitals:    04/28/22 1439 04/28/22 1546 04/28/22 1648 04/28/22 1751   BP: 103/82 99/64 115/70 (!) 101/58   Pulse: 75 72 65 72   Resp: 20 18 16 13   Temp: 98.2 °F (36.8 °C)      TempSrc: Oral      SpO2: 98% 98% 96% 93%   Weight: 200 lb (90.7 kg)      Height: 5' 5\" (1.651 m)          MDM    Pt is a 58 yo F who presents to the ED for evaluation of cough. She is afebrile and hemodynamically stable.   No respiratory distress, 98% on room air. She was given 1 L IV normal saline IV Solu-Medrol p.o. Tylenol and DuoNebs in the ED. EKG shows NSR with HR 65, normal axis, , no ST changes. CMP is remarkable for creatinine of 1.26 BUN 22 slight increase from baseline. Remainder of labs are unremarkable. COVID and flu negative. Troponin and BNP within normal limits. Chest x-ray that was done outpatient and shows a right lower lung zone infiltrate. CT of the chest is negative for PE, no evidence of pneumonia. Patient reassessed with improvement of her symptoms. She remains hemodynamically stable stable for discharge. We will treat for bronchitis with COPD exacerbation with prednisone Mucinex Tessalon and doxycycline. Follow-up with primary care 1 to 2 days return to the ED for worsening symptoms given warning signs for which she should return. Patient understands agrees to plan. REASSESSMENT          CRITICAL CARE TIME   Total Critical Care time was 0 minutes, excluding separately reportable procedures. There was a high probability of clinically significant/life threatening deterioration in the patient's condition which required my urgent intervention. CONSULTS:  None    PROCEDURES:  Unless otherwise noted below, none     Procedures        FINAL IMPRESSION      1. Cough    2.  COPD exacerbation [facility])          DISPOSITION/PLAN   DISPOSITION Decision To Discharge 04/28/2022 06:13:26 PM      PATIENT REFERRED TO:  Sisi Montoya PA-C  [address] [address]  [phone]    Schedule an appointment as soon as possible for a visit in 1 day      [facility]) ED  [address]  [phone]  Go to   As needed, If symptoms worsen    Mike Diaz, [address]  [facility] [place] [phone]    Schedule an appointment as soon as possible for a visit in 1 day        DISCHARGE MEDICATIONS:  Discharge Medication List as of 4/28/2022  6:03 PM      START taking these medications    Details   doxycycline hyclate (VIBRA-TABS) 100 MG tablet Take 1 tablet by mouth 2 times daily for 10 days, Disp-20 tablet, R-0Print      guaiFENesin (MUCINEX) 600 MG extended release tablet Take 1 tablet by mouth 2 times daily for 15 days, Disp-30 tablet, R-0Print      benzonatate (TESSALON) 100 MG capsule Take 1-2 capsules by mouth 3 times daily as needed for Cough, Disp-60 capsule, R-0Print      predniSONE (DELTASONE) 50 MG tablet Take 1 tablet by mouth daily for 5 days, Disp-5 tablet, R-0Print           Controlled Substances Monitoring:     No flowsheet data found.     (Please note that portions of this note were completed with a voice recognition program.  Efforts were made to edit the dictations but occasionally words are mis-transcribed.)    Lv Urrutia PA-C (electronically signed)             Lv Urrutia PA-C  05/03/22 1034 unable to fully assess; pt confused. no family at bedside present

## 2022-04-28 NOTE — ED NOTES
Discharge instructions reviewed with patient. Medications reviewed and explained to patient. Patient denies any further questions at this time. Pt encouraged to make follow up appointments with PCP and any speciality referrals.         Franck Morillo RN  04/28/22 4945

## 2022-04-28 NOTE — CARE COORDINATION
Medications faxed to [facility] via the MAP program. Pt instructed on where to  the medications and I did speak with the [facility] pharmacist that they are being faxed. I received a fax stating that the [facility] fax line is busy. I refaxed the scripts and called the pharmacy and left a message for them to call me. If the fax doesn't go through the patient will need to return to the ER and pick them up at the desk and take them to the pharmacy. I had tried to call the patient but got an unidentified voicemail.

## 2022-04-28 NOTE — ED TRIAGE NOTES
Pt arrived to the Ed via EMS with co Pneumonia, Pt was seen at the [facility] where they did a chest xray and seen the pneumonia. Pt states she has been sick since February with this pneumonia. The doctor at the [facility] advised the pt to come to the ER. Upon assessment pt is a/ox4 resp even and unlabored, skin pwd. Pt states she has had fevers, chills, dizziness, nausea, and chest pain. Pt denies vomiting and diarrhea.

## 2022-04-28 NOTE — PROGRESS NOTES
Subjective:      Patient ID: Ron Ascencio is a 59 y.o. female who presents today for:  Chief Complaint   Patient presents with    Finger Pain     Patient is here c/o finger pain. Pt states she has pain in R middle finger, located in fingernail. Pt describes pain as sharp and shooting, states pain increased when touched and bumped into something. Pt states pain has been ongoing for about 2 weeks.  Pneumonia     Patient is here c/o possible Pneumonia. Pt states she had pneumonia back in February, states she was diagnosed when being discharged from the hospital.       HPI  59year old female who presents for possible pneumonia, states that she was diagnosed with pneumonia on 3/2022. States she was never treated. Has harsh non productive cough. Audible wheezing. Oxygenation saturation of 96% on room air. States symptoms have been worsening over the past two weeks.  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    24 hour urine studies and aldosterone test negative     Chronic pain     Deep venous thrombosis ([facility] [address] 2011    Depression     Essential hypertension     Fibromyalgia     GERD (gastroesophageal reflux disease)     Heat intolerance     Hip pain     History of craniotomy     [facility]    Hyperlipidemia 2009    IBS (irritable bowel syndrome)     Meralgia paraesthetica     Migraine headache     Obesity     Osteopenia     Pedal edema     Sleep apnea     Umbilical hernia     Uterine fibroid      Past Surgical History:   Procedure Laterality Date     SECTION      x3    COLONOSCOPY  2013    normal    CRANIOTOMY      [facility]    FIBULA FRACTURE SURGERY  2009    ORIF right tibia and fibula     ORIF FEMUR DECOMPRESSION      right    PATELLA SURGERY  2007    partial resection right patella     TONSILLECTOMY      UPPER GASTROINTESTINAL ENDOSCOPY 2013     Social History     Socioeconomic History    Marital status:      Spouse name: Not on file    Number of children: Not on file    Years of education: Not on file    Highest education level: Not on file   Occupational History    Not on file   Tobacco Use    Smoking status: Current Every Day Smoker     Packs/day: 0.25     Years: 40.00     Pack years: 10.00     Types: Cigarettes     Last attempt to quit: 2016     Years since quittin.9    Smokeless tobacco: Never Used   Substance and Sexual Activity    Alcohol use: No     Alcohol/week: 0.0 standard drinks    Drug use: No    Sexual activity: Not on file   Other Topics Concern    Not on file   Social History Narrative    Not on file     Social Determinants of Health     Financial Resource Strain: High Risk    Difficulty of Paying Living Expenses: Very hard   Food Insecurity: Food Insecurity Present    Worried About Running Out of Food in the Last Year: Sometimes true    Paula of Food in the Last Year: Sometimes true   Transportation Needs: Unmet Transportation Needs    Lack of Transportation (Medical):  Yes    Lack of Transportation (Non-Medical): Yes   Physical Activity:     Days of Exercise per Week: Not on file    Minutes of Exercise per Session: Not on file   Stress:     Feeling of Stress : Not on file   Social Connections:     Frequency of Communication with Friends and Family: Not on file    Frequency of Social Gatherings with Friends and Family: Not on file    Attends Jainism Services: Not on file    Active Member of Clubs or Organizations: Not on file    Attends Club or Organization Meetings: Not on file    Marital Status: Not on file   Intimate Partner Violence:     Fear of Current or Ex-Partner: Not on file    Emotionally Abused: Not on file    Physically Abused: Not on file    Sexually Abused: Not on file   Housing Stability:     Unable to Pay for Housing in the Last Year: Not on file    Number of Places Lived in the Last Year: Not on file    Unstable Housing in the Last Year: Not on file     Family History   Problem Relation Age of Onset    Other Father         ALS    Cancer Maternal Grandmother     Cancer Maternal Grandfather     Cancer Paternal Grandmother     Cancer Paternal Grandfather      Allergies   Allergen Reactions    Latex Itching     bruising    Aspirin     Niaspan [Niacin Er]      Itching      Simvastatin      Current Outpatient Medications   Medication Sig Dispense Refill    SPIRIVA HANDIHALER 18 MCG inhalation capsule INHALE THE CONTENTS OF ONE (1) CAPSULE BY MOUTH VIA HANDIHALER ONCE DAILY AS DIRECTED      pantoprazole (PROTONIX) 40 MG tablet TAKE 1 TABLET BY MOUTH DAILY      azithromycin (ZITHROMAX) 500 MG tablet Take by mouth      cephALEXin (KEFLEX) 500 MG capsule Take 1 capsule by mouth 2 times daily 20 capsule 0    furosemide (LASIX) 20 MG tablet Take 20 mg by mouth daily      clotrimazole-betamethasone (LOTRISONE) 1-0.05 % cream APPLY TO AFFECTED AREA TOPICALLY TWICE DAILY      diclofenac (VOLTAREN) 75 MG EC tablet Take 75 mg by mouth 2 times daily      lidocaine 4 % external patch Place 1 patch onto the skin daily as needed      traMADol (ULTRAM) 50 MG tablet TAKE 1 TABLET BY MOUTH EVERY 6 HOURS AS NEEDED      traZODone (DESYREL) 50 MG tablet Take 50 mg by mouth nightly      fluticasone (FLOVENT HFA) 220 MCG/ACT inhaler Inhale 1 puff into the lungs 2 times daily      budesonide-formoterol (SYMBICORT) 80-4.5 MCG/ACT AERO Inhale 2 puffs into the lungs 2 times daily      albuterol (PROVENTIL) (2.5 MG/3ML) 0.083% nebulizer solution Take 2.5 mg by nebulization every 6 hours as needed for Wheezing      sertraline (ZOLOFT) 100 MG tablet Take 1 tablet by mouth daily 30 tablet 3    tiZANidine (ZANAFLEX) 4 MG tablet Take 4 mg by mouth every 8 hours as needed      rivaroxaban (XARELTO) 20 MG TABS tablet Take 20 mg by mouth Daily with supper      pantoprazole sodium (PROTONIX) 40 MG PACK packet Take 40 mg by mouth every morning (before breakfast)      propranolol (INDERAL) 40 MG tablet Take 40 mg by mouth 2 times daily      topiramate (TOPAMAX) 100 MG tablet Take 100 mg by mouth 2 times daily      topiramate (TOPAMAX) 25 MG tablet Take 25 mg by mouth 2 times daily      gabapentin (NEURONTIN) 800 MG tablet Take 1 tablet by mouth 3 times daily 270 tablet 1    ketoconazole (NIZORAL) 2 % cream Apply topically daily Apply topically daily. Skin folds and under breasts      LYRICA 225 MG capsule Take 1 capsule by mouth 2 times daily  3    nortriptyline (PAMELOR) 25 MG capsule Take 50 mg by mouth nightly      potassium chloride SA (K-DUR;KLOR-CON M) 20 MEQ tablet Take 1 tablet by mouth daily (Patient taking differently: Take 20 mEq by mouth 2 times daily ) 90 tablet 3    triamterene-hydrochlorothiazide (MAXZIDE-25) 37.5-25 MG per tablet Take 1 tablet by mouth daily (Patient taking differently: Take 1 tablet by mouth at bedtime ) 90 tablet 3    levothyroxine (LEVOTHROID) 75 MCG tablet Take 1 tablet by mouth daily 90 tablet 3    rosuvastatin (CRESTOR) 40 MG tablet Take 1 tablet by mouth every evening 90 tablet 3    DULoxetine (CYMBALTA) 60 MG capsule Take 1 capsule by mouth 2 times daily (Patient taking differently: Take 60 mg by mouth daily ) 180 capsule 3    dicyclomine (BENTYL) 20 MG tablet Take 1 tablet by mouth three times daily (Patient taking differently: Take 20 mg by mouth 3 times daily as needed ) 270 tablet 3    albuterol sulfate HFA (PROVENTIL HFA) 108 (90 BASE) MCG/ACT inhaler Inhale 2 puffs into the lungs every 6 hours as needed for Wheezing 3 Inhaler 3     No current facility-administered medications for this visit. Review of Systems   Respiratory: Positive for cough, chest tightness, shortness of breath and wheezing. Psychiatric/Behavioral: Negative for agitation, behavioral problems and confusion.        Objective:   /72 (Site: Left Upper Arm, Position: Sitting, Cuff Size: Large Adult)   Pulse 56   Temp 96.5 °F (35.8 °C)   Ht 5' 5\" (1.651 m)   Wt 200 lb (90.7 kg)   LMP 07/30/2014   SpO2 96%   BMI 33.28 kg/m²     Physical Exam  Vitals and nursing note reviewed. Constitutional:       General: She is awake. She is not in acute distress. Appearance: Normal appearance. She is well-developed and normal weight. She is not ill-appearing, toxic-appearing or diaphoretic. Comments: No photophobia. No phonophobia. HENT:      Head: Normocephalic and atraumatic. No Zhou's sign. Right Ear: External ear normal.      Left Ear: External ear normal.      Nose: Nose normal. No congestion or rhinorrhea. Mouth/Throat:      Mouth: Mucous membranes are moist.      Pharynx: Oropharynx is clear. No oropharyngeal exudate or posterior oropharyngeal erythema. Eyes:      General: No scleral icterus. Right eye: No foreign body or discharge. Left eye: No discharge. Extraocular Movements: Extraocular movements intact. Conjunctiva/sclera: Conjunctivae normal.      Left eye: No exudate. Pupils: Pupils are equal, round, and reactive to light. Neck:      Vascular: No JVD. Trachea: No tracheal deviation. Comments: No meningismus. Cardiovascular:      Rate and Rhythm: Normal rate and regular rhythm. Heart sounds: Normal heart sounds. Heart sounds not distant. No murmur heard. No friction rub. No gallop. Pulmonary:      Effort: Pulmonary effort is normal. No respiratory distress. Breath sounds: Normal breath sounds. No stridor. No wheezing, rhonchi or rales. Chest:      Chest wall: No tenderness. Abdominal:      General: Abdomen is flat. Bowel sounds are normal. There is no distension. Palpations: Abdomen is soft. There is no mass. Tenderness: There is no abdominal tenderness. There is no right CVA tenderness, left CVA tenderness, guarding or rebound. Hernia: No hernia is present. Musculoskeletal:         General: No swelling, tenderness, deformity or signs of injury. Normal range of motion. Cervical back: Normal range of motion and neck supple. No rigidity. Lymphadenopathy:      Head:      Right side of head: No submental adenopathy. Left side of head: No submental adenopathy. Skin:     General: Skin is warm and dry. Capillary Refill: Capillary refill takes less than 2 seconds. Coloration: Skin is not jaundiced or pale. Findings: No bruising, erythema, lesion or rash. Neurological:      General: No focal deficit present. Mental Status: She is alert and oriented to person, place, and time. Mental status is at baseline. Cranial Nerves: No cranial nerve deficit. Sensory: No sensory deficit. Motor: No weakness. Coordination: Coordination normal.      Deep Tendon Reflexes: Reflexes are normal and symmetric. Psychiatric:         Mood and Affect: Mood normal.         Behavior: Behavior normal. Behavior is cooperative. Thought Content: Thought content normal.         Judgment: Judgment normal.         Assessment:       Diagnosis Orders   1. Cough  POCT COVID-19, Antigen    XR CHEST STANDARD (2 VW)   2. History of pneumonia  XR CHEST STANDARD (2 VW)   3. Shortness of breath       No results found for this visit on 04/28/22. Plan:     Assessment & Plan   Celsa Lainez was seen today for finger pain and pneumonia. Diagnoses and all orders for this visit:    Cough  -     POCT COVID-19, Antigen  -     XR CHEST STANDARD (2 VW); Future    History of pneumonia  -     XR CHEST STANDARD (2 VW);  Future    Shortness of breath      Orders Placed This Encounter   Procedures    XR CHEST STANDARD (2 VW)     Standing Status:   Future     Number of Occurrences:   1     Standing Expiration Date:   4/28/2023     Order Specific Question:   Reason for exam:     Answer:   r/o pna    POCT COVID-19, Antigen     Order Specific Question:   Is this test for diagnosis or screening? Answer:   Diagnosis of ill patient     Order Specific Question:   Symptomatic for COVID-19 as defined by CDC? Answer:   Yes     Order Specific Question:   Date of Symptom Onset     Answer:   4/14/2022     Order Specific Question:   Hospitalized for COVID-19? Answer:   No     Order Specific Question:   Admitted to ICU for COVID-19? Answer:   No     Order Specific Question:   Employed in healthcare setting? Answer:   Unknown     Order Specific Question:   Resident in a congregate (group) care setting? Answer:   Unknown     Order Specific Question:   Pregnant? Answer:   No     Order Specific Question:   Previously tested for COVID-19? Answer:   Unknown     No orders of the defined types were placed in this encounter. There are no discontinued medications. Return if symptoms worsen or fail to improve. During evaluation of patient, she requested that 911 be called and that EMS take her to the ED for immediate evaluation. 911 was called, EMS currently here to transport patient. Reviewed with the patient/family: current clinical status & medications. Side effects of the medication prescribed today, as well as treatment plan/rationale and result expectations have been discussed with the patient/family who expresses understanding. Patient will be discharged home in stable condition. Follow up with PCP to evaluate treatment results or return if symptoms worsen or fail to improve. Discussed signs and symptoms which require immediate follow-up in ED/call to 911. Understanding verbalized. I have reviewed the patient's medical history in detail and updated the computerized patient record.     ZION Jennings

## 2022-04-30 LAB
EKG ATRIAL RATE: 65 BPM
EKG P AXIS: 67 DEGREES
EKG P-R INTERVAL: 220 MS
EKG Q-T INTERVAL: 420 MS
EKG QRS DURATION: 70 MS
EKG QTC CALCULATION (BAZETT): 436 MS
EKG R AXIS: 11 DEGREES
EKG T AXIS: 103 DEGREES
EKG VENTRICULAR RATE: 65 BPM

## 2022-04-30 PROCEDURE — 93010 ELECTROCARDIOGRAM REPORT: CPT | Performed by: INTERNAL MEDICINE

## 2022-05-28 PROBLEM — R05.9 COUGH: Status: RESOLVED | Noted: 2022-04-28 | Resolved: 2022-05-28

## 2022-06-02 ENCOUNTER — OFFICE VISIT (OUTPATIENT)
Dept: FAMILY MEDICINE CLINIC | Age: 57
End: 2022-06-02
Payer: MEDICARE

## 2022-06-02 VITALS
DIASTOLIC BLOOD PRESSURE: 60 MMHG | TEMPERATURE: 97.2 F | OXYGEN SATURATION: 98 % | BODY MASS INDEX: 34.79 KG/M2 | HEIGHT: 65 IN | WEIGHT: 208.8 LBS | HEART RATE: 64 BPM | SYSTOLIC BLOOD PRESSURE: 99 MMHG

## 2022-06-02 DIAGNOSIS — R55 SYNCOPE, UNSPECIFIED SYNCOPE TYPE: Primary | ICD-10-CM
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
## 2022-06-02 PROCEDURE — 99214 OFFICE O/P EST MOD 30 MIN: CPT | Performed by: NURSE PRACTITIONER
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
## 2022-06-02 NOTE — PATIENT INSTRUCTIONS
Patient Education        Fainting: Care Instructions  Your Care Instructions     When you faint, or pass out, you lose consciousness for a short time. A brief drop in blood flow to the brain often causes it. When you fall or lie down,more blood flows to your brain and you regain consciousness. Emotional stress, pain, or overheating--especially if you have been standing--can make you faint. In these cases, fainting is usually not serious. But fainting can be a sign of a more serious problem. Your doctor may want you to have moretests to rule out other causes. The treatment you need depends on the reason why you fainted. The doctor has checked you carefully, but problems can develop later. If you notice any problems or new symptoms, get medical treatment right away. Follow-up care is a key part of your treatment and safety. Be sure to make and go to all appointments, and call your doctor if you are having problems. It's also a good idea to know your test results and keep alist of the medicines you take. How can you care for yourself at home?  Drink plenty of fluids to prevent dehydration. If you have kidney, heart, or liver disease and have to limit fluids, talk with your doctor before you increase your fluid intake. When should you call for help? Call 911 anytime you think you may need emergency care. For example, call if:     You have symptoms of a heart problem. These may include:  ? Chest pain or pressure. ? Severe trouble breathing. ? A fast or irregular heartbeat. ? Lightheadedness or sudden weakness. ? Coughing up pink, foamy mucus. ? Passing out. After you call 911, the  may tell you to chew 1 adult-strength or 2 to 4 low-dose aspirin. Wait for an ambulance. Do not try to drive yourself.      You have symptoms of a stroke. These may include:  ? Sudden numbness, tingling, weakness, or loss of movement in your face, arm, or leg, especially on only one side of your body.   ? Sudden vision changes. ? Sudden trouble speaking. ? Sudden confusion or trouble understanding simple statements. ? Sudden problems with walking or balance. ? A sudden, severe headache that is different from past headaches.      You passed out (lost consciousness) again. Watch closely for changes in your health, and be sure to contact your doctor if:     You do not get better as expected. Where can you learn more? Go to [email or web address] [email or web address]. org and sign in to your [employer] account. Enter A008 in the [employer] box to learn more about \"Fainting: Care Instructions. \"     If you do not have an account, please click on the \"Sign Up Now\" link. Current as of: July 1, 2021               Content Version: 13.2  © 2006-2022 Healthwise, Incorporated. Care instructions adapted under license by [facility] ([facility]). If you have questions about a medical condition or this instruction, always ask your healthcare professional. [place] [id number] any warranty or liability for your use of this information.

## 2022-06-06 ASSESSMENT — ENCOUNTER SYMPTOMS
SHORTNESS OF BREATH: 0
WHEEZING: 0
COUGH: 0

## 2022-07-18 ENCOUNTER — HOSPITAL ENCOUNTER (OUTPATIENT)
Dept: WOMENS IMAGING | Age: 57
Discharge: HOME | End: 2022-07-20
Payer: MEDICARE

## 2022-07-18 DIAGNOSIS — Z12.31 SCREENING MAMMOGRAM, ENCOUNTER FOR: ICD-10-CM

## 2022-07-18 PROCEDURE — 77063 BREAST TOMOSYNTHESIS BI: CPT

## 2022-07-28 ENCOUNTER — HOSPITAL ENCOUNTER (OUTPATIENT)
Dept: NEUROLOGY | Age: 57
Discharge: HOME | End: 2022-07-28
Payer: MEDICARE

## 2022-07-28 DIAGNOSIS — R55 SYNCOPE AND COLLAPSE: ICD-10-CM

## 2022-07-28 PROCEDURE — 95816 EEG AWAKE AND DROWSY: CPT

## 2022-07-30 NOTE — PROCEDURES
[address]                      [facility], [address] [facility]                          ELECTROENCEPHALOGRAM REPORT    PATIENT NAME: Ruma Sotelo              :        1965  MED REC NO:   [id number]                            ROOM:  ACCOUNT NO:   [de-identified]                           ADMIT DATE: 2022  PROVIDER:     Fermín Esquivel MD    DATE OF EE2022    REFERRING PROVIDER:  Fannie Arora MD    REASON FOR STUDY:  The patient had a history of syncope and depression. Hyperventilation could not be done during the study. EEG FINDINGS:  This is a 20-channel EEG. The patient has a background  of 8 to 9 Hz alpha activity which is moderately well attenuated and  moderately well organized. Artifacts appear to the record due to the  patient's movements, muscle activity, electrical interference and  electrodes. Cardiac monitor shows a heart rate of 92 beats per minute  and is regular. Eye opening suppresses the background well. During  sleep slowing of the background activity appears. On photic  stimulation, good photic drive is seen. No epileptic discharges were  seen during or after the photic stimulation. IMPRESSION:  This is a normal awake, drowsy, and sleep EEG. If a  convulsive disorder is strongly suspected, we may repeat the EEG with  sleep deprivation.         Vadim Arellano MD    D: 2022 17:07:51       T: 2022 20:12:24     [email or web address]  Job#: [id number]     Doc#: [id number]    CC:

## 2022-08-03 LAB
ALBUMIN SERPL-MCNC: 4.2 G/DL (ref 3.5–4.6)
ALP BLD-CCNC: 140 U/L (ref 40–130)
ALT SERPL-CCNC: 36 U/L (ref 0–33)
ANION GAP SERPL CALCULATED.3IONS-SCNC: 9 MEQ/L (ref 9–15)
AST SERPL-CCNC: 24 U/L (ref 0–35)
BILIRUB SERPL-MCNC: <0.2 MG/DL (ref 0.2–0.7)
BILIRUBIN DIRECT: <0.2 MG/DL (ref 0–0.4)
BILIRUBIN, INDIRECT: ABNORMAL MG/DL (ref 0–0.6)
BUN BLDV-MCNC: 35 MG/DL (ref 6–20)
CALCIUM SERPL-MCNC: 9.2 MG/DL (ref 8.5–9.9)
CHLORIDE BLD-SCNC: 101 MEQ/L (ref 95–107)
CHOLESTEROL, TOTAL: 145 MG/DL (ref 0–199)
CO2: 31 MEQ/L (ref 20–31)
CREAT SERPL-MCNC: 2.02 MG/DL (ref 0.5–0.9)
GFR AFRICAN AMERICAN: 30.4
GFR NON-AFRICAN AMERICAN: 25.2
GLUCOSE BLD-MCNC: 105 MG/DL (ref 70–99)
HCT VFR BLD CALC: 37 % (ref 37–47)
HDLC SERPL-MCNC: 33 MG/DL (ref 40–59)
HEMOGLOBIN: 12.2 G/DL (ref 12–16)
LDL CHOLESTEROL CALCULATED: 77 MG/DL (ref 0–129)
MCH RBC QN AUTO: 30.8 PG (ref 27–31.3)
MCHC RBC AUTO-ENTMCNC: 33 % (ref 33–37)
MCV RBC AUTO: 93.5 FL (ref 82–100)
PDW BLD-RTO: 15.5 % (ref 11.5–14.5)
PLATELET # BLD: 210 K/UL (ref 130–400)
POTASSIUM SERPL-SCNC: 3.2 MEQ/L (ref 3.4–4.9)
RBC # BLD: 3.96 M/UL (ref 4.2–5.4)
SODIUM BLD-SCNC: 141 MEQ/L (ref 135–144)
TOTAL PROTEIN: 6.8 G/DL (ref 6.3–8)
TRIGL SERPL-MCNC: 176 MG/DL (ref 0–150)
WBC # BLD: 4.4 K/UL (ref 4.8–10.8)

## 2022-09-13 ENCOUNTER — OFFICE VISIT (OUTPATIENT)
Dept: FAMILY MEDICINE CLINIC | Age: 57
End: 2022-09-13
Payer: MEDICARE

## 2022-09-13 VITALS
WEIGHT: 185 LBS | DIASTOLIC BLOOD PRESSURE: 68 MMHG | OXYGEN SATURATION: 95 % | BODY MASS INDEX: 30.82 KG/M2 | SYSTOLIC BLOOD PRESSURE: 100 MMHG | HEART RATE: 65 BPM | TEMPERATURE: 98.1 F | HEIGHT: 65 IN

## 2022-09-13 DIAGNOSIS — E78.2 MIXED HYPERLIPIDEMIA: ICD-10-CM

## 2022-09-13 DIAGNOSIS — M54.50 CHRONIC MIDLINE LOW BACK PAIN, UNSPECIFIED WHETHER SCIATICA PRESENT: Primary | ICD-10-CM

## 2022-09-13 DIAGNOSIS — G89.29 CHRONIC MIDLINE LOW BACK PAIN, UNSPECIFIED WHETHER SCIATICA PRESENT: ICD-10-CM

## 2022-09-13 DIAGNOSIS — M54.50 CHRONIC MIDLINE LOW BACK PAIN, UNSPECIFIED WHETHER SCIATICA PRESENT: ICD-10-CM

## 2022-09-13 DIAGNOSIS — G89.29 CHRONIC MIDLINE LOW BACK PAIN, UNSPECIFIED WHETHER SCIATICA PRESENT: Primary | ICD-10-CM

## 2022-09-13 LAB
RHEUMATOID FACTOR: <10 IU/ML
SEDIMENTATION RATE, ERYTHROCYTE: 12 MM (ref 0–30)

## 2022-09-13 PROCEDURE — 99214 OFFICE O/P EST MOD 30 MIN: CPT | Performed by: PHYSICIAN ASSISTANT

## 2022-09-13 NOTE — PROGRESS NOTES
Subjective  Mayela Radford, 59 y.o. female presents today with:  Chief Complaint   Patient presents with    Follow-up     Patient presents today for a f/u. HPI  Reports syncopal episode 2 times in 3 mos  Las time 18 yrs ago prior to brain surgery    Review of Systems      Past Medical History:   Diagnosis Date    Acquired hypothyroidism 2010    Adrenal gland anomaly 2013    24 hour urine studies and aldosterone test negative     Chronic pain     Deep venous thrombosis ([address] 2011    Depression     Essential hypertension     Fibromyalgia     GERD (gastroesophageal reflux disease)     Heat intolerance     Hip pain     History of craniotomy 2007    [facility]    Hyperlipidemia 2009    IBS (irritable bowel syndrome)     Meralgia paraesthetica     Migraine headache     Obesity     Osteopenia     Pedal edema     Sleep apnea     Umbilical hernia     Uterine fibroid      Past Surgical History:   Procedure Laterality Date     SECTION      x3    COLONOSCOPY  2013    normal    CRANIOTOMY  2007    [facility] [place]    FIBULA FRACTURE SURGERY  2009    ORIF right tibia and fibula     ORIF FEMUR DECOMPRESSION      right    PATELLA SURGERY  2007    partial resection right patella     TONSILLECTOMY      UPPER GASTROINTESTINAL ENDOSCOPY  2013     Social History     Socioeconomic History    Marital status:       Spouse name: Not on file    Number of children: Not on file    Years of education: Not on file    Highest education level: Not on file   Occupational History    Not on file   Tobacco Use    Smoking status: Every Day     Packs/day: 0.25     Years: 40.00     Pack years: 10.00     Types: Cigarettes     Last attempt to quit: 2016     Years since quittin.3    Smokeless tobacco: Never   Substance and Sexual Activity    Alcohol use: No     Alcohol/week: 0.0 standard drinks    Drug use: No    Sexual activity: Not on file   Other Topics Concern    Not on file   Social History Narrative    Not on file     Social Determinants of Health     Financial Resource Strain: High Risk    Difficulty of Paying Living Expenses: Very hard   Food Insecurity: Food Insecurity Present    Worried About Running Out of Food in the Last Year: Sometimes true    Ran Out of Food in the Last Year: Sometimes true   Transportation Needs: Unmet Transportation Needs    Lack of Transportation (Medical): Yes    Lack of Transportation (Non-Medical): Yes   Physical Activity: Not on file   Stress: Not on file   Social Connections: Not on file   Intimate Partner Violence: Not on file   Housing Stability: Not on file     Family History   Problem Relation Age of Onset    Other Father         ALS    Cancer Maternal Grandmother     Cancer Maternal Grandfather     Cancer Paternal Grandmother     Cancer Paternal Grandfather         Allergies   Allergen Reactions    Latex Itching     bruising    Aspirin     Niaspan [Niacin Er]      Itching      Simvastatin      Current Outpatient Medications   Medication Sig Dispense Refill    metoprolol tartrate (LOPRESSOR) 25 MG tablet Take 25 mg by mouth 2 times daily      pantoprazole (PROTONIX) 40 MG tablet TAKE 1 TABLET BY MOUTH DAILY      furosemide (LASIX) 20 MG tablet Take 20 mg by mouth daily      clotrimazole-betamethasone (LOTRISONE) 1-0.05 % cream APPLY TO AFFECTED AREA TOPICALLY TWICE DAILY      diclofenac (VOLTAREN) 75 MG EC tablet Take 75 mg by mouth 2 times daily      traZODone (DESYREL) 50 MG tablet Take 50 mg by mouth nightly      budesonide-formoterol (SYMBICORT) 80-4.5 MCG/ACT AERO Inhale 2 puffs into the lungs 2 times daily      albuterol (PROVENTIL) (2.5 MG/3ML) 0.083% nebulizer solution Take 2.5 mg by nebulization every 6 hours as needed for Wheezing      rivaroxaban (XARELTO) 20 MG TABS tablet Take 20 mg by mouth Daily with supper      topiramate (TOPAMAX) 100 MG tablet Take 100 mg by mouth 2 times daily      topiramate (TOPAMAX) 25 MG tablet Take 25 mg by mouth 2 times daily      gabapentin (NEURONTIN) 800 MG tablet Take 1 tablet by mouth 3 times daily 270 tablet 1    nortriptyline (PAMELOR) 25 MG capsule Take 10 mg by mouth nightly      potassium chloride SA (K-DUR;KLOR-CON M) 20 MEQ tablet Take 1 tablet by mouth daily (Patient taking differently: Take 20 mEq by mouth 2 times daily) 90 tablet 3    triamterene-hydrochlorothiazide (MAXZIDE-25) 37.5-25 MG per tablet Take 1 tablet by mouth daily (Patient taking differently: Take 1 tablet by mouth at bedtime) 90 tablet 3    levothyroxine (LEVOTHROID) 75 MCG tablet Take 1 tablet by mouth daily 90 tablet 3    propranolol (INDERAL) 40 MG tablet Take 40 mg by mouth 2 times daily (Patient not taking: Reported on 9/13/2022)      albuterol sulfate HFA (PROVENTIL HFA) 108 (90 BASE) MCG/ACT inhaler Inhale 2 puffs into the lungs every 6 hours as needed for Wheezing 3 Inhaler 3     No current facility-administered medications for this visit.        Objective    Vitals:    09/13/22 0920   BP: 100/68   Site: Right Upper Arm   Position: Sitting   Cuff Size: Medium Adult   Pulse: 65   Temp: 98.1 °F (36.7 °C)   TempSrc: Temporal   SpO2: 95%   Weight: 185 lb (83.9 kg)   Height: 5' 5\" (1.651 m)     Physical Exam

## 2022-09-16 DIAGNOSIS — M47.816 OSTEOARTHRITIS OF LUMBAR SPINE, UNSPECIFIED SPINAL OSTEOARTHRITIS COMPLICATION STATUS: Primary | ICD-10-CM

## 2022-09-17 LAB — ANA IGG, ELISA: NORMAL

## 2022-09-21 ENCOUNTER — TELEPHONE (OUTPATIENT)
Dept: FAMILY MEDICINE CLINIC | Age: 57
End: 2022-09-21

## 2022-09-22 NOTE — TELEPHONE ENCOUNTER
Spoke with patient who stated that was not inquiring about pain medication but antibiotic for her hands.

## 2022-09-26 ENCOUNTER — OFFICE VISIT (OUTPATIENT)
Dept: FAMILY MEDICINE CLINIC | Age: 57
End: 2022-09-26

## 2022-09-26 VITALS
HEART RATE: 73 BPM | HEIGHT: 65 IN | SYSTOLIC BLOOD PRESSURE: 110 MMHG | BODY MASS INDEX: 29.99 KG/M2 | TEMPERATURE: 98.2 F | WEIGHT: 180 LBS | DIASTOLIC BLOOD PRESSURE: 70 MMHG | OXYGEN SATURATION: 92 %

## 2022-09-26 DIAGNOSIS — F41.9 ANXIETY: ICD-10-CM

## 2022-09-26 DIAGNOSIS — Z00.00 INITIAL MEDICARE ANNUAL WELLNESS VISIT: ICD-10-CM

## 2022-09-26 DIAGNOSIS — M25.50 ARTHRALGIA, UNSPECIFIED JOINT: ICD-10-CM

## 2022-09-26 DIAGNOSIS — R25.2 LEG CRAMPS: ICD-10-CM

## 2022-09-26 DIAGNOSIS — F33.41 RECURRENT MAJOR DEPRESSIVE DISORDER, IN PARTIAL REMISSION (HCC): Primary | ICD-10-CM

## 2022-09-26 DIAGNOSIS — K59.09 OTHER CONSTIPATION: ICD-10-CM

## 2022-09-26 DIAGNOSIS — E87.6 HYPOKALEMIA: ICD-10-CM

## 2022-09-26 LAB — POTASSIUM SERPL-SCNC: 3.5 MEQ/L (ref 3.4–4.9)

## 2022-09-26 PROCEDURE — 90471 IMMUNIZATION ADMIN: CPT | Performed by: PHYSICIAN ASSISTANT

## 2022-09-26 PROCEDURE — 90715 TDAP VACCINE 7 YRS/> IM: CPT | Performed by: PHYSICIAN ASSISTANT

## 2022-09-26 PROCEDURE — G0438 PPPS, INITIAL VISIT: HCPCS | Performed by: PHYSICIAN ASSISTANT

## 2022-09-26 RX ORDER — DOCUSATE SODIUM 100 MG/1
100 CAPSULE, LIQUID FILLED ORAL 2 TIMES DAILY
Qty: 90 CAPSULE | Refills: 3 | Status: SHIPPED | OUTPATIENT
Start: 2022-09-26 | End: 2022-10-26

## 2022-09-26 RX ORDER — ACETAMINOPHEN 500 MG
500 TABLET ORAL 4 TIMES DAILY PRN
Qty: 120 TABLET | Refills: 5 | Status: SHIPPED | OUTPATIENT
Start: 2022-09-26

## 2022-09-26 ASSESSMENT — PATIENT HEALTH QUESTIONNAIRE - PHQ9
SUM OF ALL RESPONSES TO PHQ QUESTIONS 1-9: 16
2. FEELING DOWN, DEPRESSED OR HOPELESS: 1
7. TROUBLE CONCENTRATING ON THINGS, SUCH AS READING THE NEWSPAPER OR WATCHING TELEVISION: 3
SUM OF ALL RESPONSES TO PHQ QUESTIONS 1-9: 16
8. MOVING OR SPEAKING SO SLOWLY THAT OTHER PEOPLE COULD HAVE NOTICED. OR THE OPPOSITE, BEING SO FIGETY OR RESTLESS THAT YOU HAVE BEEN MOVING AROUND A LOT MORE THAN USUAL: 1
3. TROUBLE FALLING OR STAYING ASLEEP: 3
SUM OF ALL RESPONSES TO PHQ QUESTIONS 1-9: 16
1. LITTLE INTEREST OR PLEASURE IN DOING THINGS: 1
6. FEELING BAD ABOUT YOURSELF - OR THAT YOU ARE A FAILURE OR HAVE LET YOURSELF OR YOUR FAMILY DOWN: 1
5. POOR APPETITE OR OVEREATING: 3
4. FEELING TIRED OR HAVING LITTLE ENERGY: 3
9. THOUGHTS THAT YOU WOULD BE BETTER OFF DEAD, OR OF HURTING YOURSELF: 0
10. IF YOU CHECKED OFF ANY PROBLEMS, HOW DIFFICULT HAVE THESE PROBLEMS MADE IT FOR YOU TO DO YOUR WORK, TAKE CARE OF THINGS AT HOME, OR GET ALONG WITH OTHER PEOPLE: 0
SUM OF ALL RESPONSES TO PHQ QUESTIONS 1-9: 16
SUM OF ALL RESPONSES TO PHQ9 QUESTIONS 1 & 2: 2

## 2022-09-26 ASSESSMENT — LIFESTYLE VARIABLES
HOW MANY STANDARD DRINKS CONTAINING ALCOHOL DO YOU HAVE ON A TYPICAL DAY: 1 OR 2
HOW OFTEN DO YOU HAVE A DRINK CONTAINING ALCOHOL: 2-4 TIMES A MONTH

## 2022-09-26 NOTE — PATIENT INSTRUCTIONS
Personalized Preventive Plan for Kell Fuentes - 9/26/2022  Medicare offers a range of preventive health benefits. Some of the tests and screenings are paid in full while other may be subject to a deductible, co-insurance, and/or copay. Some of these benefits include a comprehensive review of your medical history including lifestyle, illnesses that may run in your family, and various assessments and screenings as appropriate. After reviewing your medical record and screening and assessments performed today your provider may have ordered immunizations, labs, imaging, and/or referrals for you. A list of these orders (if applicable) as well as your Preventive Care list are included within your After Visit Summary for your review. Other Preventive Recommendations:    A preventive eye exam performed by an eye specialist is recommended every 1-2 years to screen for glaucoma; cataracts, macular degeneration, and other eye disorders. A preventive dental visit is recommended every 6 months. Try to get at least 150 minutes of exercise per week or 10,000 steps per day on a pedometer . Order or download the FREE \"Exercise & Physical Activity: Your Everyday Guide\" from [employer]. Call 4-708.824.3930 or search [employer] online. You need 8459-6596 mg of calcium and 6960-0494 IU of vitamin D per day. It is possible to meet your calcium requirement with diet alone, but a vitamin D supplement is usually necessary to meet this goal.  When exposed to the sun, use a sunscreen that protects against both UVA and UVB radiation with an SPF of 30 or greater. Reapply every 2 to 3 hours or after sweating, drying off with a towel, or swimming. Always wear a seat belt when traveling in a car. Always wear a helmet when riding a bicycle or motorcycle.

## 2022-09-26 NOTE — PROGRESS NOTES
Medicare Annual Wellness Visit    Steve Long is here for Medicare AWV (Patient presents today for medica annual wellness visit. )    Assessment & Plan   Other constipation  -     docusate sodium (COLACE) 100 MG capsule; Take 1 capsule by mouth 2 times daily, Disp-90 capsule, R-3Normal  Recurrent major depressive disorder, in partial remission ([facility] [address]  -     Amb External Referral To Psychiatry  Anxiety  -     Amb External Referral To Psychiatry  Muscle spasm  Leg cramps  Arthralgia, unspecified joint  Hypokalemia  -     Potassium; Future  Initial Medicare annual wellness visit    Recommendations for Preventive Services Due: see orders and patient instructions/AVS.  Recommended screening schedule for the next 5-10 years is provided to the patient in written form: see Patient Instructions/AVS.     Return in 3 months (on 12/26/2022) for Medicare Annual Wellness Visit in 1 year. Subjective     Pt with c.o wide spread joint pain  - using diclofenac rx'd by previous PCP bid. Denies abnormal bleeding  - pt is on xarelto  Labs creat 2.02 gfr 30.4 on 8/3  C.o dry cracked skin on hands  - peeling bleeding improved with neosporin and emollient  C.o leg cramps - K at 3.2 on 8/3  Takes potassium 20meq daily on lasix 20mg daily  C.o legs cramps and spasm in her feet worsened over the past week  C.o depression - has received therapy in the past - denies SI    Patient's complete Health Risk Assessment and screening values have been reviewed and are found in Flowsheets. The following problems were reviewed today and where indicated follow up appointments were made and/or referrals ordered.     Positive Risk Factor Screenings with Interventions:    Fall Risk:  Do you feel unsteady or are you worried about falling? : (!) yes  2 or more falls in past year?: (!) yes  Fall with injury in past year?: no   Fall Risk Interventions:    Home safety tips provided     Depression:  PHQ-2 Score: 2  PHQ-9 Total Score: 16    Severity:1-4 you had an eye exam within the past year?: Yes  No results found. Hearing/Vision Interventions:  Vision concerns:  patient encouraged to make appointment with his/her eye specialist     ADLs:  In the past 7 days, did you need help from others to perform any of the following everyday activities: Eating, dressing, grooming, bathing, toileting, or walking/balance?: (!) Yes  Select all that apply: (!) Walking/Balance, Toileting, Bathing, Grooming, Dressing, Eating  In the past 7 days, did you need help from others to take care of any of the following: Laundry, housekeeping, banking/finances, shopping, telephone use, food preparation, transportation, or taking medications?: (!) Yes  Select all that apply: [employer], Housekeeping, Banking/Finances, Shopping, Telephone Use, Food Preparation, Transportation  ADL Interventions:  Patient declines any further evaluation/treatment for this issue          Objective   Vitals:    09/26/22 0801   BP: 110/70   Site: Right Upper Arm   Position: Sitting   Cuff Size: Medium Adult   Pulse: 73   Temp: 98.2 °F (36.8 °C)   TempSrc: Temporal   SpO2: 92%   Weight: 180 lb (81.6 kg)   Height: 5' 5\" (1.651 m)      Body mass index is 29.95 kg/m². Allergies   Allergen Reactions    Latex Itching     bruising    Aspirin     Niaspan [Niacin Er]      Itching      Simvastatin      Muscle pain     Prior to Visit Medications    Medication Sig Taking?  Authorizing Provider   acetaminophen (TYLENOL) 500 MG tablet Take 1 tablet by mouth 4 times daily as needed for Pain Yes Simin Miles PA-C   docusate sodium (COLACE) 100 MG capsule Take 1 capsule by mouth 2 times daily Yes Simin Miles PA-C   metoprolol tartrate (LOPRESSOR) 25 MG tablet Take 25 mg by mouth 2 times daily Yes Historical Provider, MD   pantoprazole (PROTONIX) 40 MG tablet TAKE 1 TABLET BY MOUTH DAILY Yes Historical Provider, MD   furosemide (LASIX) 20 MG tablet Take 20 mg by mouth daily Yes Historical Provider, MD   clotrimazole-betamethasone (LOTRISONE) 1-0.05 % cream APPLY TO AFFECTED AREA TOPICALLY TWICE DAILY Yes Historical Provider, MD   diclofenac (VOLTAREN) 75 MG EC tablet Take 75 mg by mouth 2 times daily Yes Historical Provider, MD   traZODone (DESYREL) 50 MG tablet Take 50 mg by mouth nightly Yes Historical Provider, MD   budesonide-formoterol (SYMBICORT) 80-4.5 MCG/ACT AERO Inhale 2 puffs into the lungs 2 times daily Yes Historical Provider, MD   albuterol (PROVENTIL) (2.5 MG/3ML) 0.083% nebulizer solution Take 2.5 mg by nebulization every 6 hours as needed for Wheezing Yes Historical Provider, MD   rivaroxaban (XARELTO) 20 MG TABS tablet Take 20 mg by mouth Daily with supper Yes Historical Provider, MD   propranolol (INDERAL) 40 MG tablet Take 40 mg by mouth 2 times daily Yes Historical Provider, MD   topiramate (TOPAMAX) 100 MG tablet Take 100 mg by mouth 2 times daily Yes Historical Provider, MD   topiramate (TOPAMAX) 25 MG tablet Take 25 mg by mouth 2 times daily Yes Historical Provider, MD   gabapentin (NEURONTIN) 800 MG tablet Take 1 tablet by mouth 3 times daily Yes Ck Martinez MD   nortriptyline (PAMELOR) 25 MG capsule Take 10 mg by mouth nightly Yes Historical Provider, MD   potassium chloride SA (K-DUR;KLOR-CON M) 20 MEQ tablet Take 1 tablet by mouth daily  Patient taking differently: Take 20 mEq by mouth 2 times daily Yes Ck Martniez MD   triamterene-hydrochlorothiazide (MAXZIDE-25) 37.5-25 MG per tablet Take 1 tablet by mouth daily  Patient taking differently: Take 1 tablet by mouth at bedtime Yes Ck Martinez MD   levothyroxine (LEVOTHROID) 75 MCG tablet Take 1 tablet by mouth daily Yes Ck Martinez MD   albuterol sulfate HFA (PROVENTIL HFA) 108 (90 BASE) MCG/ACT inhaler Inhale 2 puffs into the lungs every 6 hours as needed for Wheezing  Ck Martinez MD       CareTeam (Including outside providers/suppliers regularly involved in providing care):   Patient Care Team:  Simin Miles KINSEY as PCP - General (Physician Assistant)  Yaa Scruggs PA-C as PCP - [address] [place] [address] [facility]  Sabrina Chandra MD as Consulting Physician (Gastroenterology)  Nano Real MD as Consulting Physician (Mala Parmar)  Humera Miramontes MD as Consulting Physician (Gastroenterology)  Shruti Ya MD (Pain Management)     Reviewed and updated this visit:  Tobacco  Allergies  Meds  Med Hx  Surg Hx  Soc Hx  Fam Hx               Medicare Annual Wellness Visit    Anila Caro is here for Medicare AWV (Patient presents today for medica annual wellness visit. )    Assessment & Plan   Other constipation  -     docusate sodium (COLACE) 100 MG capsule; Take 1 capsule by mouth 2 times daily, Disp-90 capsule, R-3Normal  Recurrent major depressive disorder, in partial remission ([facility] [address]  -     Amb External Referral To Psychiatry  Anxiety  -     Amb External Referral To Psychiatry  Muscle spasm  Leg cramps  Arthralgia, unspecified joint  Hypokalemia  -     Potassium; Future  Initial Medicare annual wellness visit    Recommendations for Preventive Services Due: see orders and patient instructions/AVS.  Recommended screening schedule for the next 5-10 years is provided to the patient in written form: see Patient Instructions/AVS.     Return in 3 months (on 12/26/2022) for Medicare Annual Wellness Visit in 1 year. Subjective       Patient's complete Health Risk Assessment and screening values have been reviewed and are found in Flowsheets. The following problems were reviewed today and where indicated follow up appointments were made and/or referrals ordered.     Positive Risk Factor Screenings with Interventions:    Fall Risk:  Do you feel unsteady or are you worried about falling? : (!) yes  2 or more falls in past year?: (!) yes  Fall with injury in past year?: no   Fall Risk Interventions:         Depression:  PHQ-2 Score: 2  PHQ-9 Total Score: 16    Severity:1-4 = minimal depression, 5-9 = mild depression, 10-14 = moderate depression, 15-19 = moderately severe depression, 20-27 = severe depression  Depression Interventions:      Tobacco Use:  Tobacco Use: High Risk    Smoking Tobacco Use: Every Day    Smokeless Tobacco Use: Never     E-cigarette/Vaping       Questions Responses    E-cigarette/Vaping Use     Start Date     Passive Exposure     Quit Date     Counseling Given     Comments           Substance Use - Tobacco Interventions:           General Health and ACP:  General  In general, how would you say your health is?: Fair  In the past 7 days, have you experienced any of the following: New or Increased Pain, New or Increased Fatigue, Loneliness, Social Isolation, Stress or Anger?: (!) Yes  Select all that apply: (!) New or Increased Pain, New or Increased Fatigue, Social Isolation, Stress, Anger  Do you get the social and emotional support that you need?: (!) No  Do you have a Living Will?: (!) No    Advance Directives       Power of  Living Will ACP-Advance Directive ACP-Power of     Not on File Not on File Not on File Not on File          General Health Risk Interventions:      Health Habits/Nutrition:  Physical Activity: Sufficiently Active    Days of Exercise per Week: 7 days    Minutes of Exercise per Session: 90 min     Have you lost any weight without trying in the past 3 months?: (!) Yes  Body mass index: (!) 29.95  Have you seen the dentist within the past year?: Yes  Health Habits/Nutrition Interventions:      Hearing/Vision:  Do you or your family notice any trouble with your hearing that hasn't been managed with hearing aids?: (!) Yes  Do you have difficulty driving, watching TV, or doing any of your daily activities because of your eyesight?: (!) Yes  Have you had an eye exam within the past year?: Yes  No results found.   Hearing/Vision Interventions:       ADLs:  In the past 7 days, did you need help from others to perform any of the following everyday activities: Eating, dressing, grooming, bathing, toileting, or walking/balance?: (!) Yes  Select all that apply: (!) Walking/Balance, Toileting, Bathing, Grooming, Dressing, Eating  In the past 7 days, did you need help from others to take care of any of the following: Laundry, housekeeping, banking/finances, shopping, telephone use, food preparation, transportation, or taking medications?: (!) Yes  Select all that apply: [employer], Housekeeping, Banking/Finances, Shopping, Telephone Use, Food Preparation, Transportation  ADL Interventions:            Objective   Vitals:    09/26/22 0801   BP: 110/70   Site: Right Upper Arm   Position: Sitting   Cuff Size: Medium Adult   Pulse: 73   Temp: 98.2 °F (36.8 °C)   TempSrc: Temporal   SpO2: 92%   Weight: 180 lb (81.6 kg)   Height: 5' 5\" (1.651 m)      Body mass index is 29.95 kg/m². Allergies   Allergen Reactions    Latex Itching     bruising    Aspirin     Niaspan [Niacin Er]      Itching      Simvastatin      Muscle pain     Prior to Visit Medications    Medication Sig Taking?  Authorizing Provider   acetaminophen (TYLENOL) 500 MG tablet Take 1 tablet by mouth 4 times daily as needed for Pain Yes Simin Miles PA-C   docusate sodium (COLACE) 100 MG capsule Take 1 capsule by mouth 2 times daily Yes Simin Miles PA-C   metoprolol tartrate (LOPRESSOR) 25 MG tablet Take 25 mg by mouth 2 times daily Yes Historical Provider, MD   pantoprazole (PROTONIX) 40 MG tablet TAKE 1 TABLET BY MOUTH DAILY Yes Historical Provider, MD   furosemide (LASIX) 20 MG tablet Take 20 mg by mouth daily Yes Historical Provider, MD   clotrimazole-betamethasone (LOTRISONE) 1-0.05 % cream APPLY TO AFFECTED AREA TOPICALLY TWICE DAILY Yes Historical Provider, MD   diclofenac (VOLTAREN) 75 MG EC tablet Take 75 mg by mouth 2 times daily Yes Historical Provider, MD   traZODone (DESYREL) 50 MG tablet Take 50 mg by mouth nightly Yes Historical Provider, MD   budesonide-formoterol (SYMBICORT) 80-4.5 MCG/ACT AERO Inhale 2 puffs into the lungs 2 times daily Yes Historical Provider, MD   albuterol (PROVENTIL) (2.5 MG/3ML) 0.083% nebulizer solution Take 2.5 mg by nebulization every 6 hours as needed for Wheezing Yes Historical Provider, MD   rivaroxaban (XARELTO) 20 MG TABS tablet Take 20 mg by mouth Daily with supper Yes Historical Provider, MD   propranolol (INDERAL) 40 MG tablet Take 40 mg by mouth 2 times daily Yes Historical Provider, MD   topiramate (TOPAMAX) 100 MG tablet Take 100 mg by mouth 2 times daily Yes Historical Provider, MD   topiramate (TOPAMAX) 25 MG tablet Take 25 mg by mouth 2 times daily Yes Historical Provider, MD   gabapentin (NEURONTIN) 800 MG tablet Take 1 tablet by mouth 3 times daily Yes Fantasma Reid MD   nortriptyline (PAMELOR) 25 MG capsule Take 10 mg by mouth nightly Yes Historical Provider, MD   potassium chloride SA (K-DUR;KLOR-CON M) 20 MEQ tablet Take 1 tablet by mouth daily  Patient taking differently: Take 20 mEq by mouth 2 times daily Yes Fantasma Reid MD   triamterene-hydrochlorothiazide (MAXZIDE-25) 37.5-25 MG per tablet Take 1 tablet by mouth daily  Patient taking differently: Take 1 tablet by mouth at bedtime Yes Fantasma Reid MD   levothyroxine (LEVOTHROID) 75 MCG tablet Take 1 tablet by mouth daily Yes Fantasma Reid MD   albuterol sulfate HFA (PROVENTIL HFA) 108 (90 BASE) MCG/ACT inhaler Inhale 2 puffs into the lungs every 6 hours as needed for Wheezing  Fantasma Reid MD       [facility] (Including outside providers/suppliers regularly involved in providing care):   Patient Care Team:  Yamilet Guerrero PA-C as PCP - General (Physician Assistant)  Yamilet Guerrero PA-C as PCP - [facility] Empaneled Provider  Yina Burks MD as Consulting Physician (Gastroenterology)  Leigh Ann Mejia MD as Consulting Physician (Medical Oncology)  Charo Trejo MD as Consulting Physician (Gastroenterology)  Eugenio Wright MD (Pain Management)     Reviewed and updated this visit:  Tobacco

## 2022-09-26 NOTE — PROGRESS NOTES
Subjective  Andrew Katz, 59 y.o. female presents today with:  Chief Complaint   Patient presents with    Medicare AWV     Patient presents today for medica annual wellness visit. HPI      Review of Systems      Past Medical History:   Diagnosis Date    Acquired hypothyroidism 2010    Adrenal gland anomaly 2013    24 hour urine studies and aldosterone test negative     Chronic pain     Deep venous thrombosis ([facility] [address] 2011    Depression     Essential hypertension     Fibromyalgia     GERD (gastroesophageal reflux disease)     Heat intolerance     Hip pain     History of craniotomy 2007    [facility]    Hyperlipidemia 2009    IBS (irritable bowel syndrome)     Meralgia paraesthetica     Migraine headache     Obesity     Osteopenia     Pedal edema     Sleep apnea     Umbilical hernia     Uterine fibroid      Past Surgical History:   Procedure Laterality Date     SECTION      x3    COLONOSCOPY  2013    normal    CRANIOTOMY  2007    [facility] [place]    FIBULA FRACTURE SURGERY  2009    ORIF right tibia and fibula     ORIF FEMUR DECOMPRESSION      right    PATELLA SURGERY  2007    partial resection right patella     TONSILLECTOMY      UPPER GASTROINTESTINAL ENDOSCOPY  2013     Social History     Socioeconomic History    Marital status:       Spouse name: Not on file    Number of children: Not on file    Years of education: Not on file    Highest education level: Not on file   Occupational History    Not on file   Tobacco Use    Smoking status: Every Day     Packs/day: 0.25     Years: 40.00     Pack years: 10.00     Types: Cigarettes     Last attempt to quit: 2016     Years since quittin.3    Smokeless tobacco: Never   Substance and Sexual Activity    Alcohol use: No     Alcohol/week: 0.0 standard drinks    Drug use: No    Sexual activity: Not on file   Other Topics Concern    Not on file Social History Narrative    Not on file     Social Determinants of Health     Financial Resource Strain: High Risk    Difficulty of Paying Living Expenses: Very hard   Food Insecurity: Food Insecurity Present    Worried About Running Out of Food in the Last Year: Sometimes true    Ran Out of Food in the Last Year: Sometimes true   Transportation Needs: Unmet Transportation Needs    Lack of Transportation (Medical): Yes    Lack of Transportation (Non-Medical): Yes   Physical Activity: Sufficiently Active    Days of Exercise per Week: 7 days    Minutes of Exercise per Session: 90 min   Stress: Not on file   Social Connections: Not on file   Intimate Partner Violence: Not on file   Housing Stability: Not on file     Family History   Problem Relation Age of Onset    Other Father         ALS    Cancer Maternal Grandmother     Cancer Maternal Grandfather     Cancer Paternal Grandmother     Cancer Paternal Grandfather         Allergies   Allergen Reactions    Latex Itching     bruising    Aspirin     Niaspan [Niacin Er]      Itching      Simvastatin      Muscle pain     Current Outpatient Medications   Medication Sig Dispense Refill    metoprolol tartrate (LOPRESSOR) 25 MG tablet Take 25 mg by mouth 2 times daily      pantoprazole (PROTONIX) 40 MG tablet TAKE 1 TABLET BY MOUTH DAILY      furosemide (LASIX) 20 MG tablet Take 20 mg by mouth daily      clotrimazole-betamethasone (LOTRISONE) 1-0.05 % cream APPLY TO AFFECTED AREA TOPICALLY TWICE DAILY      diclofenac (VOLTAREN) 75 MG EC tablet Take 75 mg by mouth 2 times daily      traZODone (DESYREL) 50 MG tablet Take 50 mg by mouth nightly      budesonide-formoterol (SYMBICORT) 80-4.5 MCG/ACT AERO Inhale 2 puffs into the lungs 2 times daily      albuterol (PROVENTIL) (2.5 MG/3ML) 0.083% nebulizer solution Take 2.5 mg by nebulization every 6 hours as needed for Wheezing      rivaroxaban (XARELTO) 20 MG TABS tablet Take 20 mg by mouth Daily with supper      propranolol (INDERAL) 40 MG tablet Take 40 mg by mouth 2 times daily      topiramate (TOPAMAX) 100 MG tablet Take 100 mg by mouth 2 times daily      topiramate (TOPAMAX) 25 MG tablet Take 25 mg by mouth 2 times daily      gabapentin (NEURONTIN) 800 MG tablet Take 1 tablet by mouth 3 times daily 270 tablet 1    nortriptyline (PAMELOR) 25 MG capsule Take 10 mg by mouth nightly      potassium chloride SA (K-DUR;KLOR-CON M) 20 MEQ tablet Take 1 tablet by mouth daily (Patient taking differently: Take 20 mEq by mouth 2 times daily) 90 tablet 3    triamterene-hydrochlorothiazide (MAXZIDE-25) 37.5-25 MG per tablet Take 1 tablet by mouth daily (Patient taking differently: Take 1 tablet by mouth at bedtime) 90 tablet 3    levothyroxine (LEVOTHROID) 75 MCG tablet Take 1 tablet by mouth daily 90 tablet 3    albuterol sulfate HFA (PROVENTIL HFA) 108 (90 BASE) MCG/ACT inhaler Inhale 2 puffs into the lungs every 6 hours as needed for Wheezing 3 Inhaler 3     No current facility-administered medications for this visit.        Objective    Vitals:    09/26/22 0801   BP: 110/70   Site: Right Upper Arm   Position: Sitting   Cuff Size: Medium Adult   Pulse: 73   Temp: 98.2 °F (36.8 °C)   TempSrc: Temporal   SpO2: 92%   Weight: 180 lb (81.6 kg)   Height: 5' 5\" (1.651 m)     Physical Exam

## 2022-10-10 LAB
ALBUMIN SERPL-MCNC: 4.6 G/DL (ref 3.5–4.6)
ALP BLD-CCNC: 140 U/L (ref 40–130)
ALT SERPL-CCNC: 22 U/L (ref 0–33)
ANION GAP SERPL CALCULATED.3IONS-SCNC: 14 MEQ/L (ref 9–15)
AST SERPL-CCNC: 20 U/L (ref 0–35)
BILIRUB SERPL-MCNC: <0.2 MG/DL (ref 0.2–0.7)
BILIRUBIN DIRECT: <0.2 MG/DL (ref 0–0.4)
BILIRUBIN, INDIRECT: NORMAL MG/DL (ref 0–0.6)
BUN BLDV-MCNC: 20 MG/DL (ref 6–20)
CALCIUM SERPL-MCNC: 9.3 MG/DL (ref 8.5–9.9)
CHLORIDE BLD-SCNC: 103 MEQ/L (ref 95–107)
CHOLESTEROL, TOTAL: 151 MG/DL (ref 0–199)
CO2: 25 MEQ/L (ref 20–31)
CREAT SERPL-MCNC: 1.09 MG/DL (ref 0.5–0.9)
GFR AFRICAN AMERICAN: >60
GFR NON-AFRICAN AMERICAN: 51.3
GLOBULIN: 2.8 G/DL (ref 2.3–3.5)
GLUCOSE BLD-MCNC: 124 MG/DL (ref 70–99)
HCT VFR BLD CALC: 38.7 % (ref 37–47)
HDLC SERPL-MCNC: 41 MG/DL (ref 40–59)
HEMOGLOBIN: 13 G/DL (ref 12–16)
LDL CHOLESTEROL CALCULATED: 79 MG/DL (ref 0–129)
MAGNESIUM: 2.5 MG/DL (ref 1.7–2.4)
MCH RBC QN AUTO: 31.2 PG (ref 27–31.3)
MCHC RBC AUTO-ENTMCNC: 33.6 % (ref 33–37)
MCV RBC AUTO: 92.7 FL (ref 82–100)
PDW BLD-RTO: 16.2 % (ref 11.5–14.5)
PLATELET # BLD: 237 K/UL (ref 130–400)
POTASSIUM SERPL-SCNC: 3 MEQ/L (ref 3.4–4.9)
RBC # BLD: 4.17 M/UL (ref 4.2–5.4)
SODIUM BLD-SCNC: 142 MEQ/L (ref 135–144)
TOTAL PROTEIN: 7.4 G/DL (ref 6.3–8)
TRIGL SERPL-MCNC: 154 MG/DL (ref 0–150)
WBC # BLD: 5.7 K/UL (ref 4.8–10.8)

## 2022-10-11 ENCOUNTER — INITIAL CONSULT (OUTPATIENT)
Dept: PAIN MANAGEMENT | Age: 57
End: 2022-10-11
Payer: COMMERCIAL

## 2022-10-11 VITALS
DIASTOLIC BLOOD PRESSURE: 82 MMHG | HEIGHT: 65 IN | WEIGHT: 180 LBS | TEMPERATURE: 97.6 F | SYSTOLIC BLOOD PRESSURE: 130 MMHG | BODY MASS INDEX: 29.99 KG/M2

## 2022-10-11 DIAGNOSIS — M54.16 LUMBAR RADICULOPATHY: Primary | ICD-10-CM

## 2022-10-11 DIAGNOSIS — M47.812 CERVICAL SPONDYLOSIS WITHOUT MYELOPATHY: ICD-10-CM

## 2022-10-11 PROCEDURE — 99203 OFFICE O/P NEW LOW 30 MIN: CPT | Performed by: PAIN MEDICINE

## 2022-10-11 ASSESSMENT — ENCOUNTER SYMPTOMS
GASTROINTESTINAL NEGATIVE: 1
EYES NEGATIVE: 1
RESPIRATORY NEGATIVE: 1
ALLERGIC/IMMUNOLOGIC NEGATIVE: 1
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
## 2022-10-14 ENCOUNTER — TELEPHONE (OUTPATIENT)
Dept: PAIN MANAGEMENT | Age: 57
End: 2022-10-14

## 2022-10-14 NOTE — TELEPHONE ENCOUNTER
LEFT L4-5 TFESI    AUTH APPROVED FROM 10/12/22-1/12/23    OK to schedule procedure approved as above. Please note sides/levels approved and date range.    (If applicable, sides/levels approved may differ from those ordered)    TO BE SCHEDULED WITH Nga Pop

## 2022-10-18 ENCOUNTER — OFFICE VISIT (OUTPATIENT)
Dept: FAMILY MEDICINE CLINIC | Age: 57
End: 2022-10-18
Payer: COMMERCIAL

## 2022-10-18 VITALS
SYSTOLIC BLOOD PRESSURE: 110 MMHG | OXYGEN SATURATION: 97 % | TEMPERATURE: 97.8 F | HEART RATE: 68 BPM | WEIGHT: 180 LBS | BODY MASS INDEX: 29.99 KG/M2 | DIASTOLIC BLOOD PRESSURE: 82 MMHG | RESPIRATION RATE: 18 BRPM | HEIGHT: 65 IN

## 2022-10-18 DIAGNOSIS — J06.9 URI WITH COUGH AND CONGESTION: ICD-10-CM

## 2022-10-18 DIAGNOSIS — J40 BRONCHITIS: Primary | ICD-10-CM

## 2022-10-18 LAB
INFLUENZA A ANTIBODY: NORMAL
INFLUENZA B ANTIBODY: NORMAL
Lab: NORMAL
PERFORMING INSTRUMENT: NORMAL
QC PASS/FAIL: NORMAL
SARS-COV-2, POC: NORMAL

## 2022-10-18 PROCEDURE — 87426 SARSCOV CORONAVIRUS AG IA: CPT | Performed by: PHYSICIAN ASSISTANT

## 2022-10-18 PROCEDURE — 99213 OFFICE O/P EST LOW 20 MIN: CPT | Performed by: PHYSICIAN ASSISTANT

## 2022-10-18 PROCEDURE — 87804 INFLUENZA ASSAY W/OPTIC: CPT | Performed by: PHYSICIAN ASSISTANT

## 2022-10-18 RX ORDER — DEXTROMETHORPHAN HYDROBROMIDE AND PROMETHAZINE HYDROCHLORIDE 15; 6.25 MG/5ML; MG/5ML
5 SYRUP ORAL 4 TIMES DAILY PRN
Qty: 150 ML | Refills: 0 | Status: SHIPPED | OUTPATIENT
Start: 2022-10-18 | End: 2022-10-25

## 2022-10-18 RX ORDER — METHYLPREDNISOLONE 4 MG/1
TABLET ORAL
Qty: 1 KIT | Refills: 0 | Status: SHIPPED | OUTPATIENT
Start: 2022-10-18 | End: 2022-10-24

## 2022-10-18 RX ORDER — ROSUVASTATIN CALCIUM 40 MG/1
TABLET, COATED ORAL
COMMUNITY
Start: 2022-10-18

## 2022-10-18 RX ORDER — AMOXICILLIN AND CLAVULANATE POTASSIUM 875; 125 MG/1; MG/1
1 TABLET, FILM COATED ORAL 2 TIMES DAILY
Qty: 20 TABLET | Refills: 0 | Status: SHIPPED | OUTPATIENT
Start: 2022-10-18 | End: 2022-10-28

## 2022-10-18 RX ORDER — DULOXETIN HYDROCHLORIDE 60 MG/1
CAPSULE, DELAYED RELEASE ORAL
COMMUNITY
Start: 2022-10-18

## 2022-10-18 RX ORDER — SERTRALINE HYDROCHLORIDE 100 MG/1
TABLET, FILM COATED ORAL
COMMUNITY
Start: 2022-10-18

## 2022-10-18 ASSESSMENT — ENCOUNTER SYMPTOMS
COUGH: 1
CHEST TIGHTNESS: 0
TROUBLE SWALLOWING: 0
VOMITING: 0
DIARRHEA: 0
ABDOMINAL PAIN: 0
BACK PAIN: 0
SORE THROAT: 1
SHORTNESS OF BREATH: 0
SINUS PRESSURE: 0

## 2022-10-18 NOTE — PROGRESS NOTES
Subjective:      Patient ID: Claudeen Bare is a 59 y.o. female who presents today for:  Chief Complaint   Patient presents with    Cough     Does have COPD, cough has been productive- congestion, sore throat sx started last monday       HPI  59year old female with a known history of copd. Complains of productive cough of yellow sputum, congestion and sore throat for the past week    Past Medical History:   Diagnosis Date    Acquired hypothyroidism 2010    Adrenal gland anomaly 2013    24 hour urine studies and aldosterone test negative     Chronic pain     Deep venous thrombosis ([address] 2011    Depression     Essential hypertension     Fibromyalgia     GERD (gastroesophageal reflux disease)     Heat intolerance     Hip pain     History of craniotomy 2007    [facility]    Hyperlipidemia 2009    IBS (irritable bowel syndrome)     Meralgia paraesthetica     Migraine headache     Obesity     Osteopenia     Pedal edema     Sleep apnea     Umbilical hernia     Uterine fibroid      Past Surgical History:   Procedure Laterality Date     SECTION      x3    COLONOSCOPY  2013    normal    CRANIOTOMY  2007    [facility] [place]    FIBULA FRACTURE SURGERY  2009    ORIF right tibia and fibula     ORIF FEMUR DECOMPRESSION      right    PATELLA SURGERY  2007    partial resection right patella     TONSILLECTOMY      UPPER GASTROINTESTINAL ENDOSCOPY  2013     Social History     Socioeconomic History    Marital status:       Spouse name: Not on file    Number of children: Not on file    Years of education: Not on file    Highest education level: Not on file   Occupational History    Not on file   Tobacco Use    Smoking status: Every Day     Packs/day: 0.25     Years: 40.00     Pack years: 10.00     Types: Cigarettes     Last attempt to quit: 2016     Years since quittin.4    Smokeless tobacco: Never   Substance and Sexual Activity    Alcohol use: No     Alcohol/week: 0.0 standard drinks    Drug use: No    Sexual activity: Not on file   Other Topics Concern    Not on file   Social History Narrative    Not on file     Social Determinants of Health     Financial Resource Strain: High Risk    Difficulty of Paying Living Expenses: Very hard   Food Insecurity: Food Insecurity Present    Worried About Running Out of Food in the Last Year: Sometimes true    Ran Out of Food in the Last Year: Sometimes true   Transportation Needs: Unmet Transportation Needs    Lack of Transportation (Medical): Yes    Lack of Transportation (Non-Medical): Yes   Physical Activity: Sufficiently Active    Days of Exercise per Week: 7 days    Minutes of Exercise per Session: 90 min   Stress: Not on file   Social Connections: Not on file   Intimate Partner Violence: Not on file   Housing Stability: Not on file     Family History   Problem Relation Age of Onset    Other Father         ALS    Cancer Maternal Grandmother     Cancer Maternal Grandfather     Cancer Paternal Grandmother     Cancer Paternal Grandfather      Allergies   Allergen Reactions    Latex Itching     bruising    Aspirin     Niaspan [Niacin Er]      Itching      Simvastatin      Muscle pain     Current Outpatient Medications   Medication Sig Dispense Refill    DULoxetine (CYMBALTA) 60 MG extended release capsule       rosuvastatin (CRESTOR) 40 MG tablet       sertraline (ZOLOFT) 100 MG tablet       amoxicillin-clavulanate (AUGMENTIN) 875-125 MG per tablet Take 1 tablet by mouth 2 times daily for 10 days 20 tablet 0    promethazine-dextromethorphan (PROMETHAZINE-DM) 6.25-15 MG/5ML syrup Take 5 mLs by mouth 4 times daily as needed for Cough 150 mL 0    methylPREDNISolone (MEDROL DOSEPACK) 4 MG tablet Take by mouth.  1 kit 0    acetaminophen (TYLENOL) 500 MG tablet Take 1 tablet by mouth 4 times daily as needed for Pain 120 tablet 5    docusate sodium (COLACE) 100 MG capsule Take 1 capsule by mouth 2 times daily 90 capsule 3    metoprolol tartrate (LOPRESSOR) 25 MG tablet Take 25 mg by mouth 2 times daily      pantoprazole (PROTONIX) 40 MG tablet TAKE 1 TABLET BY MOUTH DAILY      furosemide (LASIX) 20 MG tablet Take 20 mg by mouth daily      clotrimazole-betamethasone (LOTRISONE) 1-0.05 % cream APPLY TO AFFECTED AREA TOPICALLY TWICE DAILY      diclofenac (VOLTAREN) 75 MG EC tablet Take 75 mg by mouth in the morning and 75 mg in the evening. traZODone (DESYREL) 50 MG tablet Take 50 mg by mouth nightly      budesonide-formoterol (SYMBICORT) 80-4.5 MCG/ACT AERO Inhale 2 puffs into the lungs 2 times daily      albuterol (PROVENTIL) (2.5 MG/3ML) 0.083% nebulizer solution Take 2.5 mg by nebulization every 6 hours as needed for Wheezing      rivaroxaban (XARELTO) 20 MG TABS tablet Take 20 mg by mouth Daily with supper      propranolol (INDERAL) 40 MG tablet Take 40 mg by mouth 2 times daily      topiramate (TOPAMAX) 100 MG tablet Take 100 mg by mouth 2 times daily      topiramate (TOPAMAX) 25 MG tablet Take 25 mg by mouth 2 times daily      gabapentin (NEURONTIN) 800 MG tablet Take 1 tablet by mouth 3 times daily 270 tablet 1    nortriptyline (PAMELOR) 25 MG capsule Take 10 mg by mouth nightly      potassium chloride SA (K-DUR;KLOR-CON M) 20 MEQ tablet Take 1 tablet by mouth daily (Patient taking differently: Take 20 mEq by mouth 2 times daily) 90 tablet 3    triamterene-hydrochlorothiazide (MAXZIDE-25) 37.5-25 MG per tablet Take 1 tablet by mouth daily (Patient taking differently: Take 1 tablet by mouth at bedtime) 90 tablet 3    levothyroxine (LEVOTHROID) 75 MCG tablet Take 1 tablet by mouth daily 90 tablet 3    albuterol sulfate HFA (PROVENTIL HFA) 108 (90 BASE) MCG/ACT inhaler Inhale 2 puffs into the lungs every 6 hours as needed for Wheezing 3 Inhaler 3     No current facility-administered medications for this visit.           Review of Systems   Constitutional:  Negative for activity change, appetite change, chills, fever and unexpected weight change. HENT:  Positive for congestion and sore throat. Negative for drooling, ear pain, nosebleeds, sinus pressure and trouble swallowing. Respiratory:  Positive for cough. Negative for chest tightness and shortness of breath. Cardiovascular:  Negative for chest pain and leg swelling. Gastrointestinal:  Negative for abdominal pain, diarrhea and vomiting. Endocrine: Negative for polydipsia and polyphagia. Genitourinary:  Negative for dysuria, flank pain and frequency. Musculoskeletal:  Negative for back pain and myalgias. Skin:  Negative for pallor and rash. Neurological:  Negative for syncope, weakness and headaches. Hematological:  Does not bruise/bleed easily. Psychiatric/Behavioral:  Negative for agitation, behavioral problems and confusion. All other systems reviewed and are negative. Objective:   /82 (Site: Right Upper Arm, Position: Sitting, Cuff Size: Medium Adult)   Pulse 68   Temp 97.8 °F (36.6 °C) (Temporal)   Resp 18   Ht 5' 5\" (1.651 m)   Wt 180 lb (81.6 kg)   LMP 07/30/2014   SpO2 97%   BMI 29.95 kg/m²     Physical Exam  Vitals and nursing note reviewed. Constitutional:       General: She is awake. She is not in acute distress. Appearance: Normal appearance. She is well-developed and normal weight. She is not ill-appearing, toxic-appearing or diaphoretic. Comments: No photophobia. No phonophobia. HENT:      Head: Normocephalic and atraumatic. No Zhou's sign. Right Ear: External ear normal.      Left Ear: External ear normal.      Nose: Nose normal. No congestion or rhinorrhea. Mouth/Throat:      Mouth: Mucous membranes are moist.      Pharynx: Oropharynx is clear. No oropharyngeal exudate or posterior oropharyngeal erythema. Eyes:      General: No scleral icterus. Right eye: No foreign body or discharge. Left eye: No discharge.       Extraocular Movements: Extraocular movements intact. Conjunctiva/sclera: Conjunctivae normal.      Left eye: No exudate. Pupils: Pupils are equal, round, and reactive to light. Neck:      Vascular: No JVD. Trachea: No tracheal deviation. Comments: No meningismus. Cardiovascular:      Rate and Rhythm: Normal rate and regular rhythm. Heart sounds: Normal heart sounds. Heart sounds not distant. No murmur heard. No friction rub. No gallop. Pulmonary:      Effort: Pulmonary effort is normal. No respiratory distress. Breath sounds: Normal breath sounds. No stridor. No wheezing, rhonchi or rales. Chest:      Chest wall: No tenderness. Abdominal:      General: Abdomen is flat. Bowel sounds are normal. There is no distension. Palpations: Abdomen is soft. There is no mass. Tenderness: There is no abdominal tenderness. There is no right CVA tenderness, left CVA tenderness, guarding or rebound. Hernia: No hernia is present. Musculoskeletal:         General: No swelling, tenderness, deformity or signs of injury. Normal range of motion. Cervical back: Normal range of motion and neck supple. No rigidity. Lymphadenopathy:      Head:      Right side of head: No submental adenopathy. Left side of head: No submental adenopathy. Skin:     General: Skin is warm and dry. Capillary Refill: Capillary refill takes less than 2 seconds. Coloration: Skin is not jaundiced or pale. Findings: No bruising, erythema, lesion or rash. Neurological:      General: No focal deficit present. Mental Status: She is alert and oriented to person, place, and time. Mental status is at baseline. Cranial Nerves: No cranial nerve deficit. Sensory: No sensory deficit. Motor: No weakness. Coordination: Coordination normal.      Deep Tendon Reflexes: Reflexes are normal and symmetric.    Psychiatric:         Mood and Affect: Mood normal.         Behavior: Behavior normal. Behavior is cooperative. Thought Content: Thought content normal.         Judgment: Judgment normal.       Assessment:       Diagnosis Orders   1. Bronchitis  amoxicillin-clavulanate (AUGMENTIN) 875-125 MG per tablet    promethazine-dextromethorphan (PROMETHAZINE-DM) 6.25-15 MG/5ML syrup    methylPREDNISolone (MEDROL DOSEPACK) 4 MG tablet    XR CHEST STANDARD (2 VW)      2. URI with cough and congestion  POCT COVID-19, Antigen    POCT Influenza A/B    amoxicillin-clavulanate (AUGMENTIN) 875-125 MG per tablet    promethazine-dextromethorphan (PROMETHAZINE-DM) 6.25-15 MG/5ML syrup    methylPREDNISolone (MEDROL DOSEPACK) 4 MG tablet    XR CHEST STANDARD (2 VW)        Results for POC orders placed in visit on 10/18/22   POCT Influenza A/B   Result Value Ref Range    Influenza A Ab neg     Influenza B Ab neg       Plan:     Assessment & Plan   Meghann Lynch was seen today for cough. Diagnoses and all orders for this visit:    Bronchitis  -     amoxicillin-clavulanate (AUGMENTIN) 875-125 MG per tablet; Take 1 tablet by mouth 2 times daily for 10 days  -     promethazine-dextromethorphan (PROMETHAZINE-DM) 6.25-15 MG/5ML syrup; Take 5 mLs by mouth 4 times daily as needed for Cough  -     methylPREDNISolone (MEDROL DOSEPACK) 4 MG tablet; Take by mouth. -     XR CHEST STANDARD (2 VW); Future    URI with cough and congestion  -     POCT COVID-19, Antigen  -     POCT Influenza A/B  -     amoxicillin-clavulanate (AUGMENTIN) 875-125 MG per tablet; Take 1 tablet by mouth 2 times daily for 10 days  -     promethazine-dextromethorphan (PROMETHAZINE-DM) 6.25-15 MG/5ML syrup; Take 5 mLs by mouth 4 times daily as needed for Cough  -     methylPREDNISolone (MEDROL DOSEPACK) 4 MG tablet; Take by mouth. -     XR CHEST STANDARD (2 VW);  Future    Orders Placed This Encounter   Procedures    XR CHEST STANDARD (2 VW)     Standing Status:   Future     Standing Expiration Date:   10/18/2023     Order Specific Question:   Reason for exam:     Answer: r/o pna    POCT COVID-19, Antigen     Order Specific Question:   Is this test for diagnosis or screening? Answer:   Diagnosis of ill patient     Order Specific Question:   Symptomatic for COVID-19 as defined by CDC? Answer:   Yes     Order Specific Question:   Date of Symptom Onset     Answer:   10/16/2022     Order Specific Question:   Hospitalized for COVID-19? Answer:   No     Order Specific Question:   Admitted to ICU for COVID-19? Answer:   No     Order Specific Question:   Employed in healthcare setting? Answer:   No     Order Specific Question:   Resident in a congregate (group) care setting? Answer:   No     Order Specific Question:   Pregnant? Answer:   No    POCT Influenza A/B     Orders Placed This Encounter   Medications    amoxicillin-clavulanate (AUGMENTIN) 875-125 MG per tablet     Sig: Take 1 tablet by mouth 2 times daily for 10 days     Dispense:  20 tablet     Refill:  0    promethazine-dextromethorphan (PROMETHAZINE-DM) 6.25-15 MG/5ML syrup     Sig: Take 5 mLs by mouth 4 times daily as needed for Cough     Dispense:  150 mL     Refill:  0    methylPREDNISolone (MEDROL DOSEPACK) 4 MG tablet     Sig: Take by mouth. Dispense:  1 kit     Refill:  0     There are no discontinued medications. Return if symptoms worsen or fail to improve. Reviewed with the patient/family: current clinical status & medications. Side effects of the medication prescribed today, as well as treatment plan/rationale and result expectations have been discussed with the patient/family who expresses understanding. Patient will be discharged home in stable condition. Follow up with PCP to evaluate treatment results or return if symptoms worsen or fail to improve. Discussed signs and symptoms which require immediate follow-up in ED/call to 911. Understanding verbalized. I have reviewed the patient's medical history in detail and updated the computerized patient record.     Carolina Alfonso Ranjan Hunt, PA

## 2022-10-19 NOTE — TELEPHONE ENCOUNTER
Left voicemail for the patient to schedule procedure.     (MK-LEFT L4-5 TFESI-AUTH APPROVED 10/12/22-1/12/23)

## 2022-10-26 ENCOUNTER — HOSPITAL ENCOUNTER (OUTPATIENT)
Dept: NEUROLOGY | Age: 57
Discharge: HOME | End: 2022-10-26
Payer: COMMERCIAL

## 2022-10-26 DIAGNOSIS — M54.16 LUMBAR RADICULOPATHY: ICD-10-CM

## 2022-10-26 DIAGNOSIS — G60.0 HEREDITARY MOTOR AND SENSORY NEUROPATHY: ICD-10-CM

## 2022-10-26 DIAGNOSIS — M54.50 LOW BACK PAIN, UNSPECIFIED BACK PAIN LATERALITY, UNSPECIFIED CHRONICITY, UNSPECIFIED WHETHER SCIATICA PRESENT: ICD-10-CM

## 2022-10-26 PROCEDURE — 95910 NRV CNDJ TEST 7-8 STUDIES: CPT

## 2022-10-26 PROCEDURE — 95886 MUSC TEST DONE W/N TEST COMP: CPT
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
## 2022-11-10 NOTE — TELEPHONE ENCOUNTER
PT scheduled procedure, she says that she is on Xarelto prescribed by Dr. Silvia Cassidy, does she need to stop this prior to her LEFT L4-5 TFESI?

## 2022-11-25 DIAGNOSIS — G89.29 OTHER CHRONIC PAIN: ICD-10-CM

## 2022-11-25 NOTE — TELEPHONE ENCOUNTER
requesting medication refill.  Please approve or deny this request.    Rx requested:  Requested Prescriptions     Pending Prescriptions Disp Refills    sertraline (ZOLOFT) 100 MG tablet [Pharmacy Med Name: SERTRALINE 100 MG  Tablet] 60 tablet 10     Sig: TAKE 2 TABLETS BY MOUTH DAILY    gabapentin (NEURONTIN) 800 MG tablet [Pharmacy Med Name: GABAPENTIN 800 MG TABS 800 Tablet] 90 tablet 10     Sig: TAKE 1 TABLET BY MOUTH THREE TIMES DAILY    clotrimazole-betamethasone (Singh ) 1-0.05 % cream [Pharmacy Med Name: Clarnce Sleeper 45GM 1-0.05 Cream] 45 g 10     Sig: APPLY TO [facility] Office Visit:   9/26/2022      Next Visit Date:  Future Appointments   Date Time Provider Adonis Hylton   12/1/2022  9:30 AM MD WANDA Mcdaniel [facility] EMERGENCY [facility] AT [place]   12/27/2022  8:00 AM Simin Miles PA-C [address]

## 2022-11-27 RX ORDER — SERTRALINE HYDROCHLORIDE 100 MG/1
TABLET, FILM COATED ORAL
Qty: 60 TABLET | Refills: 5 | Status: SHIPPED | OUTPATIENT
Start: 2022-11-27

## 2022-11-27 RX ORDER — CLOTRIMAZOLE AND BETAMETHASONE DIPROPIONATE 10; .64 MG/G; MG/G
CREAM TOPICAL
Qty: 45 G | Refills: 3 | Status: SHIPPED | OUTPATIENT
Start: 2022-11-27

## 2022-11-27 RX ORDER — GABAPENTIN 800 MG/1
TABLET ORAL
Qty: 90 TABLET | Refills: 5 | Status: SHIPPED | OUTPATIENT
Start: 2022-11-27 | End: 2022-12-27

## 2022-12-01 ENCOUNTER — TELEPHONE (OUTPATIENT)
Dept: PAIN MANAGEMENT | Age: 57
End: 2022-12-01

## 2022-12-01 ENCOUNTER — PROCEDURE VISIT (OUTPATIENT)
Dept: PAIN MANAGEMENT | Age: 57
End: 2022-12-01

## 2022-12-01 DIAGNOSIS — M54.16 LUMBAR RADICULOPATHY: Primary | ICD-10-CM

## 2022-12-01 RX ORDER — LIDOCAINE HYDROCHLORIDE 10 MG/ML
10 INJECTION, SOLUTION EPIDURAL; INFILTRATION; INTRACAUDAL; PERINEURAL ONCE
Status: COMPLETED | OUTPATIENT
Start: 2022-12-01 | End: 2022-12-01

## 2022-12-01 RX ORDER — TRIAMCINOLONE ACETONIDE 40 MG/ML
40 INJECTION, SUSPENSION INTRA-ARTICULAR; INTRAMUSCULAR ONCE
Status: COMPLETED | OUTPATIENT
Start: 2022-12-01 | End: 2022-12-01

## 2022-12-01 RX ORDER — BUPIVACAINE HYDROCHLORIDE 2.5 MG/ML
30 INJECTION, SOLUTION INFILTRATION; PERINEURAL ONCE
Status: COMPLETED | OUTPATIENT
Start: 2022-12-01 | End: 2022-12-01

## 2022-12-01 RX ADMIN — BUPIVACAINE HYDROCHLORIDE 75 MG: 2.5 INJECTION, SOLUTION INFILTRATION; PERINEURAL at 09:29

## 2022-12-01 RX ADMIN — TRIAMCINOLONE ACETONIDE 40 MG: 40 INJECTION, SUSPENSION INTRA-ARTICULAR; INTRAMUSCULAR at 09:23

## 2022-12-01 RX ADMIN — LIDOCAINE HYDROCHLORIDE 10 MG: 10 INJECTION, SOLUTION EPIDURAL; INFILTRATION; INTRACAUDAL; PERINEURAL at 09:28

## 2022-12-01 ASSESSMENT — ENCOUNTER SYMPTOMS
EYES NEGATIVE: 1
RESPIRATORY NEGATIVE: 1
GASTROINTESTINAL NEGATIVE: 1
ALLERGIC/IMMUNOLOGIC NEGATIVE: 1
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
## 2022-12-19 ENCOUNTER — OFFICE VISIT (OUTPATIENT)
Dept: PAIN MANAGEMENT | Age: 57
End: 2022-12-19
Payer: COMMERCIAL

## 2022-12-19 VITALS
TEMPERATURE: 97.9 F | WEIGHT: 178 LBS | SYSTOLIC BLOOD PRESSURE: 108 MMHG | BODY MASS INDEX: 29.66 KG/M2 | HEIGHT: 65 IN | DIASTOLIC BLOOD PRESSURE: 78 MMHG

## 2022-12-19 DIAGNOSIS — G89.29 OTHER CHRONIC PAIN: ICD-10-CM

## 2022-12-19 PROCEDURE — 99203 OFFICE O/P NEW LOW 30 MIN: CPT | Performed by: PAIN MEDICINE

## 2022-12-19 RX ORDER — GABAPENTIN 800 MG/1
800 TABLET ORAL 3 TIMES DAILY
Qty: 90 TABLET | Refills: 5 | Status: SHIPPED | OUTPATIENT
Start: 2022-12-19 | End: 2023-01-18

## 2022-12-19 ASSESSMENT — ENCOUNTER SYMPTOMS
ALLERGIC/IMMUNOLOGIC NEGATIVE: 1
RESPIRATORY NEGATIVE: 1
EYES NEGATIVE: 1
GASTROINTESTINAL NEGATIVE: 1
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
## 2022-12-20 ENCOUNTER — TELEPHONE (OUTPATIENT)
Dept: PAIN MANAGEMENT | Age: 57
End: 2022-12-20

## 2022-12-20 NOTE — TELEPHONE ENCOUNTER
12/1/22 INITIAL TFESI DONE W/ 50% PAIN RELIEF    PATIENT CANNOT HAVE REPEAT UNLESS 2 MONTHS HAVE PASSED SINCE PREVIOUS TFESI    PLEASE ADVISE PATIENT AND PROVIDER THAT PATIENT IS NOT ELIGIBLE UNTIL AFTER 2/1/23

## 2023-01-05 NOTE — TELEPHONE ENCOUNTER
DR. Julio Counter RESPONSE, \"Follow up Please\". I TRIED CALLING PT, BUT UNABLE TO LM - VOICEMAIL BOX NOT SET UP. PLEASE TRY AGAIN. NEED TO INFORM PT OF LINDSAY GARAY AND PER DR. RICHEY PLEASE SCHEDULE FOLLOW UP TO DISCUSS.

## 2023-01-09 NOTE — TELEPHONE ENCOUNTER
PT WAS CALLED AND HAS NEW INSURANCE - DEVOTED  NEW INSURANCE INFO WAS ENTERED. PT ASKING IF WE CAN CHECK ON AUTH STATUS FOR TESI WITH THIS NEW INSURANCE?

## 2023-01-11 NOTE — TELEPHONE ENCOUNTER
PATIENTS INSURANCE FOLLOWS MEDICARE GUIDELINES AND THEY ALLOW REPEAT TFESI'S EVERY 3 MONTHS AS LONG AS THE PATIENT HAS HAD 50% PAIN RELIEF. ITS BEEN DOCUMENTED THAT PATIENT HAS HAD ATLEAST 50% PAIN RELIEF FROM THE FIRST PROCEDURE, HOWEVER IT IS TOO SOON TO SUBMIT FOR AUTH FOR A REPEAT INJECTION.

## 2023-02-23 ENCOUNTER — HOSPITAL ENCOUNTER (OUTPATIENT)
Dept: MRI IMAGING | Age: 58
Discharge: HOME | End: 2023-02-25
Payer: COMMERCIAL

## 2023-02-23 DIAGNOSIS — M54.16 RADICULOPATHY, LUMBAR REGION: ICD-10-CM

## 2023-02-23 DIAGNOSIS — M54.12 RADICULOPATHY, CERVICAL REGION: ICD-10-CM

## 2023-02-23 PROCEDURE — 72156 MRI NECK SPINE W/O & W/DYE: CPT

## 2023-02-23 PROCEDURE — A9579 GAD-BASE MR CONTRAST NOS,1ML: HCPCS | Performed by: PSYCHIATRY & NEUROLOGY

## 2023-02-23 PROCEDURE — 72158 MRI LUMBAR SPINE W/O & W/DYE: CPT

## 2023-02-23 PROCEDURE — 6360000004 HC RX CONTRAST MEDICATION: Performed by: PSYCHIATRY & NEUROLOGY

## 2023-02-23 RX ADMIN — GADOTERIDOL 15 ML: 279.3 INJECTION, SOLUTION INTRAVENOUS at 15:15

## 2023-03-16 RX ORDER — CLOTRIMAZOLE AND BETAMETHASONE DIPROPIONATE 10; .64 MG/G; MG/G
CREAM TOPICAL
Qty: 45 G | Refills: 10 | OUTPATIENT
Start: 2023-03-16

## 2023-03-22 RX ORDER — CLOTRIMAZOLE AND BETAMETHASONE DIPROPIONATE 10; .64 MG/G; MG/G
CREAM TOPICAL
Qty: 45 G | Refills: 10 | Status: SHIPPED | OUTPATIENT
Start: 2023-03-22

## 2023-03-22 NOTE — TELEPHONE ENCOUNTER
Patient requesting medication refill. Please approve or deny this request.    Rx requested:  Requested Prescriptions     Pending Prescriptions Disp Refills    clotrimazole-betamethasone (LOTRISONE) 1-0.05 % cream [Pharmacy Med Name: CLOTRIM-BETAM 45GM CRM 1-0.05 Cream] 45 g 10     Sig: APPLY TO AFFECTED AREA(S) TOPICALLY TWICE DAILY         Last Office Visit:   9/26/2022      Next Visit Date:  No future appointments.

## 2023-04-18 ENCOUNTER — APPOINTMENT (OUTPATIENT)
Dept: ULTRASOUND IMAGING | Age: 58
End: 2023-04-18
Payer: COMMERCIAL

## 2023-04-18 ENCOUNTER — HOSPITAL ENCOUNTER (EMERGENCY)
Age: 58
Discharge: HOME | End: 2023-04-18
Attending: EMERGENCY MEDICINE
Payer: COMMERCIAL

## 2023-04-18 VITALS
DIASTOLIC BLOOD PRESSURE: 56 MMHG | HEIGHT: 65 IN | OXYGEN SATURATION: 98 % | RESPIRATION RATE: 18 BRPM | SYSTOLIC BLOOD PRESSURE: 104 MMHG | HEART RATE: 68 BPM | TEMPERATURE: 98.2 F | WEIGHT: 182 LBS | BODY MASS INDEX: 30.32 KG/M2

## 2023-04-18 DIAGNOSIS — M79.89 LEG SWELLING: Primary | ICD-10-CM

## 2023-04-18 LAB
ALBUMIN SERPL-MCNC: 3.9 G/DL (ref 3.5–4.6)
ALP SERPL-CCNC: 106 U/L (ref 40–130)
ALT SERPL-CCNC: 12 U/L (ref 0–33)
ANION GAP SERPL CALCULATED.3IONS-SCNC: 13 MEQ/L (ref 9–15)
AST SERPL-CCNC: 13 U/L (ref 0–35)
BASOPHILS # BLD: 0 K/UL (ref 0–0.2)
BASOPHILS NFR BLD: 0.7 %
BILIRUB SERPL-MCNC: 0.4 MG/DL (ref 0.2–0.7)
BUN SERPL-MCNC: 14 MG/DL (ref 8–23)
CALCIUM SERPL-MCNC: 9.4 MG/DL (ref 8.5–9.9)
CHLORIDE SERPL-SCNC: 106 MEQ/L (ref 95–107)
CO2 SERPL-SCNC: 24 MEQ/L (ref 20–31)
CREAT SERPL-MCNC: 0.94 MG/DL (ref 0.5–0.9)
EOSINOPHIL # BLD: 0 K/UL (ref 0–0.7)
EOSINOPHIL NFR BLD: 0.1 %
ERYTHROCYTE [DISTWIDTH] IN BLOOD BY AUTOMATED COUNT: 16 % (ref 11.5–14.5)
GLOBULIN SER CALC-MCNC: 3.5 G/DL (ref 2.3–3.5)
GLUCOSE SERPL-MCNC: 88 MG/DL (ref 70–99)
HCT VFR BLD AUTO: 38 % (ref 37–47)
HGB BLD-MCNC: 12.6 G/DL (ref 12–16)
INR PPP: 1.1
LYMPHOCYTES # BLD: 1.6 K/UL (ref 1–4.8)
LYMPHOCYTES NFR BLD: 32.2 %
MCH RBC QN AUTO: 30.5 PG (ref 27–31.3)
MCHC RBC AUTO-ENTMCNC: 33 % (ref 33–37)
MCV RBC AUTO: 92.2 FL (ref 79.4–94.8)
MONOCYTES # BLD: 0.4 K/UL (ref 0.2–0.8)
MONOCYTES NFR BLD: 8.1 %
NEUTROPHILS # BLD: 3 K/UL (ref 1.4–6.5)
NEUTS SEG NFR BLD: 58.9 %
PLATELET # BLD AUTO: 224 K/UL (ref 130–400)
POTASSIUM SERPL-SCNC: 4.2 MEQ/L (ref 3.4–4.9)
PROT SERPL-MCNC: 7.4 G/DL (ref 6.3–8)
PROTHROMBIN TIME: 14.7 SEC (ref 12.3–14.9)
RBC # BLD AUTO: 4.12 M/UL (ref 4.2–5.4)
SODIUM SERPL-SCNC: 143 MEQ/L (ref 135–144)
WBC # BLD AUTO: 5.1 K/UL (ref 4.8–10.8)

## 2023-04-18 PROCEDURE — 6360000002 HC RX W HCPCS: Performed by: EMERGENCY MEDICINE

## 2023-04-18 PROCEDURE — 36415 COLL VENOUS BLD VENIPUNCTURE: CPT

## 2023-04-18 PROCEDURE — 80053 COMPREHEN METABOLIC PANEL: CPT

## 2023-04-18 PROCEDURE — 99284 EMERGENCY DEPT VISIT MOD MDM: CPT

## 2023-04-18 PROCEDURE — 85025 COMPLETE CBC W/AUTO DIFF WBC: CPT

## 2023-04-18 PROCEDURE — 93971 EXTREMITY STUDY: CPT

## 2023-04-18 PROCEDURE — 96375 TX/PRO/DX INJ NEW DRUG ADDON: CPT

## 2023-04-18 PROCEDURE — 85610 PROTHROMBIN TIME: CPT

## 2023-04-18 PROCEDURE — 96374 THER/PROPH/DIAG INJ IV PUSH: CPT

## 2023-04-18 RX ORDER — HYDROCODONE BITARTRATE AND ACETAMINOPHEN 5; 325 MG/1; MG/1
1 TABLET ORAL EVERY 6 HOURS PRN
Qty: 12 TABLET | Refills: 0 | Status: SHIPPED | OUTPATIENT
Start: 2023-04-18 | End: 2023-04-21

## 2023-04-18 RX ORDER — MORPHINE SULFATE 4 MG/ML
4 INJECTION, SOLUTION INTRAMUSCULAR; INTRAVENOUS ONCE
Status: COMPLETED | OUTPATIENT
Start: 2023-04-18 | End: 2023-04-18

## 2023-04-18 RX ORDER — ONDANSETRON 2 MG/ML
4 INJECTION INTRAMUSCULAR; INTRAVENOUS ONCE
Status: COMPLETED | OUTPATIENT
Start: 2023-04-18 | End: 2023-04-18

## 2023-04-18 RX ADMIN — MORPHINE SULFATE 4 MG: 4 INJECTION, SOLUTION INTRAMUSCULAR; INTRAVENOUS at 17:53

## 2023-04-18 RX ADMIN — ONDANSETRON 4 MG: 2 INJECTION INTRAMUSCULAR; INTRAVENOUS at 17:52

## 2023-04-18 ASSESSMENT — ENCOUNTER SYMPTOMS
SHORTNESS OF BREATH: 0
COUGH: 0
EYE DISCHARGE: 0
WHEEZING: 0
VOMITING: 0
ABDOMINAL DISTENTION: 0
PHOTOPHOBIA: 0
ABDOMINAL PAIN: 0
CHEST TIGHTNESS: 0
SORE THROAT: 0

## 2023-04-18 ASSESSMENT — PAIN SCALES - GENERAL: PAINLEVEL_OUTOF10: 10

## 2023-04-18 ASSESSMENT — PAIN DESCRIPTION - FREQUENCY: FREQUENCY: CONTINUOUS

## 2023-04-18 ASSESSMENT — PAIN - FUNCTIONAL ASSESSMENT
PAIN_FUNCTIONAL_ASSESSMENT: 0-10
PAIN_FUNCTIONAL_ASSESSMENT: NONE - DENIES PAIN

## 2023-04-18 ASSESSMENT — PAIN DESCRIPTION - ORIENTATION: ORIENTATION: LEFT;LOWER;MID

## 2023-04-18 ASSESSMENT — PAIN DESCRIPTION - ONSET: ONSET: ON-GOING

## 2023-04-18 ASSESSMENT — PAIN DESCRIPTION - LOCATION: LOCATION: LEG

## 2023-04-18 ASSESSMENT — PAIN DESCRIPTION - DESCRIPTORS: DESCRIPTORS: THROBBING;SHOOTING;STABBING

## 2023-04-18 NOTE — ED TRIAGE NOTES
Pt a/o x 3 ski n pink w/d resp non labored. Denies sob. Pt reports lt leg swollen 6 days ago. Pt returned from [facility] via plane and the next day leg swelled. O/e lt leg swollen compared to rt leg.neg redness noted but leg is tender.

## 2023-04-18 NOTE — ED PROVIDER NOTES
DAILY    DICLOFENAC (VOLTAREN) 75 MG EC TABLET    Take 75 mg by mouth in the morning and 75 mg in the evening. FUROSEMIDE (LASIX) 20 MG TABLET    Take 20 mg by mouth daily    GABAPENTIN (NEURONTIN) 800 MG TABLET    Take 1 tablet by mouth 3 times daily for 30 days. LEVOTHYROXINE (LEVOTHROID) 75 MCG TABLET    Take 1 tablet by mouth daily    METOPROLOL TARTRATE (LOPRESSOR) 25 MG TABLET    Take 25 mg by mouth 2 times daily    PANTOPRAZOLE (PROTONIX) 40 MG TABLET    TAKE 1 TABLET BY MOUTH DAILY    POTASSIUM CHLORIDE SA (K-DUR;KLOR-CON M) 20 MEQ TABLET    Take 1 tablet by mouth daily    PROPRANOLOL (INDERAL) 40 MG TABLET    Take 40 mg by mouth 2 times daily    RIVAROXABAN (XARELTO) 20 MG TABS TABLET    Take 20 mg by mouth Daily with supper    ROSUVASTATIN (CRESTOR) 40 MG TABLET        SERTRALINE (ZOLOFT) 100 MG TABLET    TAKE 2 TABLETS BY MOUTH DAILY    TOPIRAMATE (TOPAMAX) 100 MG TABLET    Take 100 mg by mouth 2 times daily    TOPIRAMATE (TOPAMAX) 25 MG TABLET    Take 25 mg by mouth 2 times daily    TRAZODONE (DESYREL) 50 MG TABLET    Take 50 mg by mouth nightly    TRIAMTERENE-HYDROCHLOROTHIAZIDE (MAXZIDE-25) 37.5-25 MG PER TABLET    Take 1 tablet by mouth daily       ALLERGIES     Latex, Aspirin, Niaspan [niacin er], and Simvastatin    FAMILY HISTORY       Family History   Problem Relation Age of Onset    Other Father         ALS    Cancer Maternal Grandmother     Cancer Maternal Grandfather     Cancer Paternal Grandmother     Cancer Paternal Grandfather           SOCIAL HISTORY       Social History     Socioeconomic History    Marital status:     Tobacco Use    Smoking status: Every Day     Packs/day: 0.25     Years: 40.00     Pack years: 10.00     Types: Cigarettes     Last attempt to quit: 2016     Years since quittin.9    Smokeless tobacco: Never   Substance and Sexual Activity    Alcohol use: No     Alcohol/week: 0.0 standard drinks    Drug use: No     Social Determinants of Health     Physical

## 2023-04-18 NOTE — ED NOTES
Bedside report obtained from [address] [place] [address]. No acute distress noted. Resps even non labored. Skin p/w/d.      Rupert Yates RN  04/18/23 1949

## 2023-04-18 NOTE — ED NOTES
Pt states poss blood clot in left lower leg. Pt states on blood thinner, and pain started last Thursday after a flight to [facility]. Pt states left lower leg swollen and painful to the touch. Pt states was directed to ED for further evaluation from PCP.       Jorge Alberto Menjivar  04/18/23 [id number]

## 2023-04-19 NOTE — ED NOTES
Discharge instructions reviewed with pt. Pt verbalized understanding with no questions or concerns. Resps even, non labored. Skin p/w/d. IV removed.       Rosangela Horn RN  04/18/23 2032

## 2023-10-10 ENCOUNTER — HOSPITAL ENCOUNTER (OUTPATIENT)
Dept: ORTHOPEDIC SURGERY | Age: 58
Discharge: HOME | End: 2023-10-12
Payer: COMMERCIAL

## 2023-10-10 ENCOUNTER — OFFICE VISIT (OUTPATIENT)
Dept: ORTHOPEDIC SURGERY | Age: 58
End: 2023-10-10

## 2023-10-10 VITALS
HEART RATE: 80 BPM | OXYGEN SATURATION: 93 % | BODY MASS INDEX: 28.82 KG/M2 | TEMPERATURE: 97.4 F | WEIGHT: 173 LBS | HEIGHT: 65 IN

## 2023-10-10 DIAGNOSIS — R52 PAIN: ICD-10-CM

## 2023-10-10 DIAGNOSIS — M25.561 RIGHT KNEE PAIN, UNSPECIFIED CHRONICITY: ICD-10-CM

## 2023-10-10 DIAGNOSIS — R52 PAIN: Primary | ICD-10-CM

## 2023-10-10 DIAGNOSIS — M25.562 LEFT KNEE PAIN, UNSPECIFIED CHRONICITY: ICD-10-CM

## 2023-10-10 PROCEDURE — 73564 X-RAY EXAM KNEE 4 OR MORE: CPT

## 2023-10-10 RX ORDER — FLUTICASONE PROPIONATE 220 UG/1
AEROSOL, METERED RESPIRATORY (INHALATION)
COMMUNITY
Start: 2016-03-16

## 2023-10-10 RX ORDER — HYDROXYZINE PAMOATE 25 MG/1
25 CAPSULE ORAL 3 TIMES DAILY PRN
COMMUNITY
Start: 2023-09-22

## 2023-10-10 RX ORDER — TRIAMCINOLONE ACETONIDE 1 MG/G
CREAM TOPICAL
COMMUNITY
Start: 2023-05-03

## 2023-10-10 RX ORDER — PRAZOSIN HYDROCHLORIDE 1 MG/1
CAPSULE ORAL
COMMUNITY
Start: 2023-09-13

## 2023-10-10 RX ORDER — TIOTROPIUM BROMIDE 18 UG/1
CAPSULE ORAL; RESPIRATORY (INHALATION)
COMMUNITY
Start: 2017-10-12

## 2023-10-10 RX ORDER — DULOXETIN HYDROCHLORIDE 20 MG/1
30 CAPSULE, DELAYED RELEASE ORAL DAILY
COMMUNITY
Start: 2023-09-25 | End: 2023-12-24

## 2023-10-10 RX ORDER — NICOTINE POLACRILEX 2 MG
60 GUM BUCCAL DAILY
COMMUNITY
Start: 2023-09-25 | End: 2023-12-24

## 2023-10-10 RX ORDER — ARIPIPRAZOLE 2 MG/1
TABLET ORAL
COMMUNITY
Start: 2023-02-21

## 2023-10-10 RX ORDER — KETOCONAZOLE 20 MG/G
CREAM TOPICAL
COMMUNITY

## 2023-10-10 RX ORDER — CLOTRIMAZOLE 1 %
CREAM (GRAM) TOPICAL
COMMUNITY

## 2023-10-10 RX ORDER — DULOXETIN HYDROCHLORIDE 20 MG/1
1 CAPSULE, DELAYED RELEASE ORAL
COMMUNITY
Start: 2015-02-02

## 2023-10-17 ENCOUNTER — HOSPITAL ENCOUNTER (OUTPATIENT)
Dept: MRI IMAGING | Age: 58
Discharge: HOME | End: 2023-10-19
Attending: ORTHOPAEDIC SURGERY
Payer: COMMERCIAL

## 2023-10-17 DIAGNOSIS — M25.561 RIGHT KNEE PAIN, UNSPECIFIED CHRONICITY: ICD-10-CM

## 2023-10-17 DIAGNOSIS — M25.562 LEFT KNEE PAIN, UNSPECIFIED CHRONICITY: ICD-10-CM

## 2023-10-17 PROCEDURE — 73721 MRI JNT OF LWR EXTRE W/O DYE: CPT

## 2023-10-24 ENCOUNTER — OFFICE VISIT (OUTPATIENT)
Dept: ORTHOPEDIC SURGERY | Age: 58
End: 2023-10-24

## 2023-10-24 ENCOUNTER — HOSPITAL ENCOUNTER (OUTPATIENT)
Dept: ORTHOPEDIC SURGERY | Age: 58
Discharge: HOME | End: 2023-10-26
Payer: COMMERCIAL

## 2023-10-24 VITALS
HEIGHT: 65 IN | BODY MASS INDEX: 28.82 KG/M2 | TEMPERATURE: 97.4 F | HEART RATE: 69 BPM | OXYGEN SATURATION: 96 % | WEIGHT: 173 LBS

## 2023-10-24 DIAGNOSIS — R52 PAIN: Primary | ICD-10-CM

## 2023-10-24 DIAGNOSIS — R52 PAIN: ICD-10-CM

## 2023-10-24 PROCEDURE — 73552 X-RAY EXAM OF FEMUR 2/>: CPT

## 2023-10-24 PROCEDURE — 73590 X-RAY EXAM OF LOWER LEG: CPT

## 2023-10-24 RX ORDER — DULOXETIN HYDROCHLORIDE 60 MG/1
60 CAPSULE, DELAYED RELEASE ORAL DAILY
COMMUNITY
Start: 2023-10-20

## 2023-10-24 RX ORDER — DULOXETIN HYDROCHLORIDE 30 MG/1
30 CAPSULE, DELAYED RELEASE ORAL DAILY
COMMUNITY
Start: 2023-10-20

## 2023-10-24 RX ORDER — CHLORHEXIDINE GLUCONATE 4 G/100ML
SOLUTION TOPICAL
Qty: 118 ML | Refills: 0 | Status: SHIPPED | OUTPATIENT
Start: 2023-10-24

## 2023-10-24 NOTE — PROGRESS NOTES
by nebulization every 6 hours as needed for Wheezing, Disp: , Rfl:     rivaroxaban (XARELTO) 20 MG TABS tablet, Take 1 tablet by mouth Daily with supper, Disp: , Rfl:     propranolol (INDERAL) 40 MG tablet, Take 1 tablet by mouth 2 times daily, Disp: , Rfl:     topiramate (TOPAMAX) 100 MG tablet, Take 1 tablet by mouth 2 times daily, Disp: , Rfl:     topiramate (TOPAMAX) 25 MG tablet, Take 1 tablet by mouth 2 times daily, Disp: , Rfl:     potassium chloride SA (K-DUR;KLOR-CON M) 20 MEQ tablet, Take 1 tablet by mouth daily (Patient taking differently: Take 1 tablet by mouth 2 times daily), Disp: 90 tablet, Rfl: 3    triamterene-hydrochlorothiazide (MAXZIDE-25) 37.5-25 MG per tablet, Take 1 tablet by mouth daily (Patient taking differently: Take 1 tablet by mouth at bedtime), Disp: 90 tablet, Rfl: 3    levothyroxine (LEVOTHROID) 75 MCG tablet, Take 1 tablet by mouth daily, Disp: 90 tablet, Rfl: 3    DULoxetine (CYMBALTA) 30 MG extended release capsule, Take 1 capsule by mouth daily, Disp: , Rfl:     DULoxetine (CYMBALTA) 60 MG extended release capsule, Take 1 capsule by mouth daily, Disp: , Rfl:     gabapentin (NEURONTIN) 800 MG tablet, Take 1 tablet by mouth 3 times daily for 30 days. , Disp: 90 tablet, Rfl: 5    metoprolol tartrate (LOPRESSOR) 25 MG tablet, Take 25 mg by mouth 2 times daily, Disp: , Rfl:     traZODone (DESYREL) 50 MG tablet, Take 50 mg by mouth nightly, Disp: , Rfl:         Aniyah De Luna DO  Orthopedic and Spine Surgeon  [facility]  [phone]

## 2023-11-06 ENCOUNTER — PREP FOR PROCEDURE (OUTPATIENT)
Dept: ORTHOPEDIC SURGERY | Age: 58
End: 2023-11-06

## 2023-11-06 DIAGNOSIS — M23.222 DERANGEMENT OF POSTERIOR HORN OF MEDIAL MENISCUS OF LEFT KNEE DUE TO OLD INJURY: ICD-10-CM

## 2023-11-13 ENCOUNTER — TELEPHONE (OUTPATIENT)
Dept: ORTHOPEDIC SURGERY | Age: 58
End: 2023-11-13

## 2023-12-14 DIAGNOSIS — Z01.818 PRE-OP EXAM: ICD-10-CM

## 2023-12-14 LAB
ANION GAP SERPL CALCULATED.3IONS-SCNC: 9 MEQ/L (ref 9–15)
BASOPHILS # BLD: 0 K/UL (ref 0–0.2)
BASOPHILS NFR BLD: 0.5 %
BUN SERPL-MCNC: 18 MG/DL (ref 8–23)
CALCIUM SERPL-MCNC: 9.2 MG/DL (ref 8.5–9.9)
CHLORIDE SERPL-SCNC: 105 MEQ/L (ref 95–107)
CO2 SERPL-SCNC: 27 MEQ/L (ref 20–31)
CREAT SERPL-MCNC: 1.03 MG/DL (ref 0.5–0.9)
EOSINOPHIL # BLD: 0 K/UL (ref 0–0.7)
EOSINOPHIL NFR BLD: 0.2 %
ERYTHROCYTE [DISTWIDTH] IN BLOOD BY AUTOMATED COUNT: 15.1 % (ref 11.5–14.5)
GLUCOSE SERPL-MCNC: 98 MG/DL (ref 70–99)
HCT VFR BLD AUTO: 42.3 % (ref 37–47)
HGB BLD-MCNC: 14.3 G/DL (ref 12–16)
LYMPHOCYTES # BLD: 2.1 K/UL (ref 1–4.8)
LYMPHOCYTES NFR BLD: 47.7 %
MCH RBC QN AUTO: 30.6 PG (ref 27–31.3)
MCHC RBC AUTO-ENTMCNC: 33.8 % (ref 33–37)
MCV RBC AUTO: 90.6 FL (ref 79.4–94.8)
MONOCYTES # BLD: 0.6 K/UL (ref 0.2–0.8)
MONOCYTES NFR BLD: 13.4 %
NEUTROPHILS # BLD: 1.6 K/UL (ref 1.4–6.5)
NEUTS SEG NFR BLD: 38 %
PLATELET # BLD AUTO: 238 K/UL (ref 130–400)
POTASSIUM SERPL-SCNC: 3.6 MEQ/L (ref 3.4–4.9)
RBC # BLD AUTO: 4.67 M/UL (ref 4.2–5.4)
SODIUM SERPL-SCNC: 141 MEQ/L (ref 135–144)
WBC # BLD AUTO: 4.3 K/UL (ref 4.8–10.8)

## 2023-12-18 PROBLEM — B35.4 TINEA CORPORIS: Status: ACTIVE | Noted: 2023-12-18

## 2023-12-18 PROBLEM — F41.9 ANXIETY: Status: ACTIVE | Noted: 2023-12-18

## 2023-12-18 PROBLEM — I26.99 PULMONARY EMBOLISM (MULTI): Status: ACTIVE | Noted: 2023-12-18

## 2023-12-18 PROBLEM — K58.9 IBS (IRRITABLE BOWEL SYNDROME): Status: ACTIVE | Noted: 2023-12-18

## 2023-12-18 PROBLEM — G89.29 CHRONIC PAIN: Status: ACTIVE | Noted: 2023-12-18

## 2023-12-18 PROBLEM — R05.9 COUGH: Status: ACTIVE | Noted: 2023-12-18

## 2023-12-18 PROBLEM — F51.04 CHRONIC INSOMNIA: Status: ACTIVE | Noted: 2023-12-18

## 2023-12-18 PROBLEM — M25.512 PAIN IN LEFT SHOULDER: Status: ACTIVE | Noted: 2023-12-18
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
## 2023-12-18 PROBLEM — E03.9 HYPOTHYROIDISM: Status: ACTIVE | Noted: 2023-12-18

## 2023-12-18 PROBLEM — E87.8 ELECTROLYTE AND FLUID DISORDER: Status: ACTIVE | Noted: 2023-12-18

## 2023-12-18 PROBLEM — R32 URINARY INCONTINENCE: Status: ACTIVE | Noted: 2023-12-18

## 2023-12-18 PROBLEM — R29.6 FALLS: Status: ACTIVE | Noted: 2023-12-18

## 2023-12-18 PROBLEM — J98.8 RTI (RESPIRATORY TRACT INFECTION): Status: ACTIVE | Noted: 2023-12-18

## 2023-12-18 PROBLEM — J32.9 CHRONIC SINUSITIS: Status: ACTIVE | Noted: 2023-12-18

## 2023-12-18 PROBLEM — R06.02 EXERTIONAL SHORTNESS OF BREATH: Status: ACTIVE | Noted: 2023-12-18

## 2023-12-18 PROBLEM — R93.1 ABNORMAL ECHOCARDIOGRAM: Status: ACTIVE | Noted: 2023-12-18

## 2023-12-18 PROBLEM — E87.6 HYPOKALEMIA: Status: ACTIVE | Noted: 2023-12-18

## 2023-12-18 PROBLEM — J20.9 ACUTE BRONCHITIS: Status: ACTIVE | Noted: 2023-12-18

## 2023-12-18 PROBLEM — M75.82 TENDINITIS OF LEFT ROTATOR CUFF: Status: ACTIVE | Noted: 2023-12-18

## 2023-12-18 PROBLEM — J42 CHRONIC BRONCHITIS (MULTI): Status: ACTIVE | Noted: 2023-12-18

## 2023-12-18 PROBLEM — R21 RASH, SKIN: Status: ACTIVE | Noted: 2023-12-18

## 2023-12-18 PROBLEM — R68.84 JAW PAIN: Status: ACTIVE | Noted: 2023-12-18

## 2023-12-18 PROBLEM — G43.909 MIGRAINES: Status: ACTIVE | Noted: 2023-12-18

## 2023-12-18 PROBLEM — R49.9 VOICE DISTURBANCE: Status: ACTIVE | Noted: 2023-12-18

## 2023-12-18 PROBLEM — G47.33 OBSTRUCTIVE SLEEP APNEA: Status: ACTIVE | Noted: 2023-12-18

## 2023-12-18 PROBLEM — M75.42 IMPINGEMENT SYNDROME OF LEFT SHOULDER: Status: ACTIVE | Noted: 2023-12-18

## 2023-12-18 PROBLEM — F32.A DEPRESSION: Status: ACTIVE | Noted: 2023-12-18

## 2023-12-18 PROBLEM — R22.1 NECK MASS: Status: ACTIVE | Noted: 2023-12-18

## 2023-12-18 PROBLEM — E55.9 VITAMIN D DEFICIENCY: Status: ACTIVE | Noted: 2023-12-18

## 2023-12-18 PROBLEM — J44.9 CHRONIC OBSTRUCTIVE PULMONARY DISEASE (MULTI): Status: ACTIVE | Noted: 2023-12-18

## 2023-12-18 PROBLEM — I10 HTN (HYPERTENSION): Status: ACTIVE | Noted: 2023-12-18

## 2023-12-18 PROBLEM — R55 SYNCOPE: Status: ACTIVE | Noted: 2023-12-18

## 2023-12-18 PROBLEM — M62.838 MUSCLE SPASM: Status: ACTIVE | Noted: 2023-12-18

## 2023-12-18 PROBLEM — F17.200 CURRENT EVERY DAY SMOKER: Status: ACTIVE | Noted: 2023-12-18

## 2023-12-18 PROBLEM — I26.92 SADDLE PULMONARY EMBOLUS (MULTI): Status: ACTIVE | Noted: 2023-12-18

## 2023-12-18 PROBLEM — G25.81 RLS (RESTLESS LEGS SYNDROME): Status: ACTIVE | Noted: 2023-12-18

## 2023-12-18 PROBLEM — D17.0 LIPOMA OF NECK: Status: ACTIVE | Noted: 2023-12-18

## 2023-12-18 PROBLEM — M79.7 FIBROMYALGIA: Status: ACTIVE | Noted: 2023-12-18

## 2023-12-18 PROBLEM — G47.31 CENTRAL SLEEP APNEA: Status: ACTIVE | Noted: 2023-12-18

## 2023-12-18 PROBLEM — H92.09 OTALGIA: Status: ACTIVE | Noted: 2023-12-18

## 2023-12-18 PROBLEM — R63.5 ABNORMAL WEIGHT GAIN: Status: ACTIVE | Noted: 2023-12-18

## 2023-12-18 PROBLEM — F43.81 COMPLEX GRIEF DISORDER LASTING LONGER THAN 12 MONTHS: Status: ACTIVE | Noted: 2023-12-18

## 2023-12-18 PROBLEM — F43.10 COMPLEX POSTTRAUMATIC STRESS DISORDER: Status: ACTIVE | Noted: 2023-12-18

## 2023-12-18 PROBLEM — F41.0 PANIC ATTACKS: Status: ACTIVE | Noted: 2023-12-18

## 2023-12-18 PROBLEM — E78.5 HYPERLIPIDEMIA: Status: ACTIVE | Noted: 2023-12-18

## 2023-12-18 PROBLEM — R60.0 BILATERAL EDEMA OF LOWER EXTREMITY: Status: ACTIVE | Noted: 2023-12-18

## 2023-12-18 RX ORDER — ACETAMINOPHEN 325 MG/1
TABLET ORAL EVERY 4 HOURS PRN
COMMUNITY
Start: 2017-12-21

## 2023-12-18 RX ORDER — ALBUTEROL SULFATE 90 UG/1
AEROSOL, METERED RESPIRATORY (INHALATION)
COMMUNITY

## 2023-12-18 RX ORDER — FENOFIBRATE 54 MG/1
1 TABLET ORAL DAILY
COMMUNITY
End: 2023-12-19

## 2023-12-18 RX ORDER — ROSUVASTATIN CALCIUM 40 MG/1
1 TABLET, COATED ORAL DAILY
COMMUNITY
Start: 2017-05-19 | End: 2024-01-09 | Stop reason: SDUPTHER

## 2023-12-18 RX ORDER — LEVOTHYROXINE SODIUM 75 UG/1
1 TABLET ORAL DAILY
COMMUNITY
Start: 2015-04-21

## 2023-12-18 RX ORDER — TRIAMTERENE/HYDROCHLOROTHIAZID 37.5-25 MG
1 TABLET ORAL DAILY
COMMUNITY
Start: 2015-04-21

## 2023-12-18 RX ORDER — GABAPENTIN 800 MG/1
1 TABLET ORAL 3 TIMES DAILY
COMMUNITY
Start: 2017-05-19

## 2023-12-18 RX ORDER — PANTOPRAZOLE SODIUM 40 MG/1
FOR SUSPENSION ORAL DAILY
COMMUNITY

## 2023-12-18 RX ORDER — FUROSEMIDE 20 MG/1
1 TABLET ORAL DAILY
COMMUNITY
Start: 2022-01-18 | End: 2024-01-09 | Stop reason: SDUPTHER

## 2023-12-18 RX ORDER — TOPIRAMATE 25 MG/1
1 TABLET ORAL 2 TIMES DAILY
COMMUNITY

## 2023-12-18 RX ORDER — METOPROLOL TARTRATE 25 MG/1
1 TABLET, FILM COATED ORAL 2 TIMES DAILY
COMMUNITY
Start: 2022-06-03 | End: 2024-01-09 | Stop reason: SDUPTHER

## 2023-12-18 RX ORDER — BUDESONIDE AND FORMOTEROL FUMARATE DIHYDRATE 80; 4.5 UG/1; UG/1
2 AEROSOL RESPIRATORY (INHALATION) 2 TIMES DAILY
COMMUNITY
End: 2023-12-19

## 2023-12-18 RX ORDER — SERTRALINE HYDROCHLORIDE 100 MG/1
100 TABLET, FILM COATED ORAL DAILY
COMMUNITY

## 2023-12-18 RX ORDER — DULOXETIN HYDROCHLORIDE 60 MG/1
60 CAPSULE, DELAYED RELEASE ORAL DAILY
COMMUNITY

## 2023-12-18 RX ORDER — TOPIRAMATE 100 MG/1
1 TABLET, FILM COATED ORAL 2 TIMES DAILY
COMMUNITY
Start: 2017-06-20

## 2023-12-18 RX ORDER — POTASSIUM CHLORIDE 20 MEQ/1
20 TABLET, EXTENDED RELEASE ORAL 2 TIMES DAILY
COMMUNITY
Start: 2015-04-21 | End: 2024-01-09 | Stop reason: SDUPTHER

## 2023-12-18 RX ORDER — ALBUTEROL SULFATE 1.25 MG/3ML
1.25 SOLUTION RESPIRATORY (INHALATION) EVERY 8 HOURS PRN
COMMUNITY
Start: 2022-02-18

## 2023-12-19 ENCOUNTER — OFFICE VISIT (OUTPATIENT)
Dept: CARDIOLOGY | Facility: CLINIC | Age: 58
End: 2023-12-19
Payer: COMMERCIAL

## 2023-12-19 VITALS
SYSTOLIC BLOOD PRESSURE: 102 MMHG | DIASTOLIC BLOOD PRESSURE: 72 MMHG | BODY MASS INDEX: 28.46 KG/M2 | HEART RATE: 82 BPM | WEIGHT: 171 LBS

## 2023-12-19 DIAGNOSIS — I10 PRIMARY HYPERTENSION: ICD-10-CM

## 2023-12-19 DIAGNOSIS — R60.0 BILATERAL EDEMA OF LOWER EXTREMITY: ICD-10-CM

## 2023-12-19 DIAGNOSIS — G47.33 OBSTRUCTIVE SLEEP APNEA: ICD-10-CM

## 2023-12-19 DIAGNOSIS — Z01.818 PRE-OPERATIVE EXAMINATION: Primary | ICD-10-CM

## 2023-12-19 DIAGNOSIS — J44.9 CHRONIC OBSTRUCTIVE PULMONARY DISEASE, UNSPECIFIED COPD TYPE (MULTI): ICD-10-CM

## 2023-12-19 DIAGNOSIS — E78.2 MIXED HYPERLIPIDEMIA: ICD-10-CM

## 2023-12-19 PROCEDURE — 3078F DIAST BP <80 MM HG: CPT | Performed by: NURSE PRACTITIONER

## 2023-12-19 PROCEDURE — 99214 OFFICE O/P EST MOD 30 MIN: CPT | Performed by: NURSE PRACTITIONER

## 2023-12-19 PROCEDURE — 3074F SYST BP LT 130 MM HG: CPT | Performed by: NURSE PRACTITIONER

## 2023-12-19 RX ORDER — DULOXETIN HYDROCHLORIDE 30 MG/1
30 CAPSULE, DELAYED RELEASE ORAL DAILY
COMMUNITY

## 2023-12-19 RX ORDER — HYDROXYZINE PAMOATE 25 MG/1
25 CAPSULE ORAL 3 TIMES DAILY PRN
COMMUNITY
Start: 2023-09-22
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
## 2024-01-08 ENCOUNTER — OFFICE VISIT (OUTPATIENT)
Dept: ORTHOPEDIC SURGERY | Age: 59
End: 2024-01-08

## 2024-01-08 VITALS
HEIGHT: 66 IN | OXYGEN SATURATION: 98 % | HEART RATE: 82 BPM | SYSTOLIC BLOOD PRESSURE: 107 MMHG | DIASTOLIC BLOOD PRESSURE: 67 MMHG | BODY MASS INDEX: 27.86 KG/M2 | TEMPERATURE: 98.9 F

## 2024-01-08 DIAGNOSIS — Z98.890 S/P LEFT KNEE ARTHROSCOPY: Primary | ICD-10-CM

## 2024-01-08 PROCEDURE — 99024 POSTOP FOLLOW-UP VISIT: CPT | Performed by: PHYSICIAN ASSISTANT

## 2024-01-08 NOTE — PROGRESS NOTES
Subjective:     Lin Liang is here for follow-up after left knee arthroscopic surgery. Findings at surgery: medial meniscus tear. Pain is controlled with current analgesics.  Medication(s) being used: ibuprofen (OTC). The patient denies fever, wound drainage, increasing redness, pus, increasing pain, increasing swelling. Post op problems reported: none He is ambulating with crutches.       Incision:  healing well, no significant drainage, no dehiscence, no significant erythema   Tenderness:  mild   Flexion ROM:  diminished range with pain   Extension ROM:  full range of motion   Effusion:  mild   DVT Evaluation:  No evidence of DVT seen on physical exam.  Negative Lorrie's sign.  No cords or calf tenderness.  No significant calf/ankle edema.     Imaging  No imaging performed today        Assessment:      Status post left knee arthroscopy Doing well postoperatively.      Plan:      Sutures removed today.  Physical Therapy for post-operative rehabilitation.  Touch weight bearing.  Follow up: 4 weeks.

## 2024-01-09 ENCOUNTER — OFFICE VISIT (OUTPATIENT)
Dept: CARDIOLOGY | Facility: CLINIC | Age: 59
End: 2024-01-09
Payer: COMMERCIAL

## 2024-01-09 VITALS
HEIGHT: 65 IN | SYSTOLIC BLOOD PRESSURE: 102 MMHG | DIASTOLIC BLOOD PRESSURE: 62 MMHG | WEIGHT: 170 LBS | BODY MASS INDEX: 28.32 KG/M2

## 2024-01-09 DIAGNOSIS — J44.9 CHRONIC OBSTRUCTIVE PULMONARY DISEASE, UNSPECIFIED COPD TYPE (MULTI): ICD-10-CM

## 2024-01-09 DIAGNOSIS — R93.1 ABNORMAL ECHOCARDIOGRAM: Primary | ICD-10-CM

## 2024-01-09 DIAGNOSIS — E55.9 VITAMIN D DEFICIENCY: ICD-10-CM

## 2024-01-09 DIAGNOSIS — G43.909 MIGRAINE WITHOUT STATUS MIGRAINOSUS, NOT INTRACTABLE, UNSPECIFIED MIGRAINE TYPE: ICD-10-CM

## 2024-01-09 DIAGNOSIS — F41.9 ANXIETY: ICD-10-CM

## 2024-01-09 DIAGNOSIS — R55 SYNCOPE, UNSPECIFIED SYNCOPE TYPE: ICD-10-CM

## 2024-01-09 DIAGNOSIS — I10 PRIMARY HYPERTENSION: ICD-10-CM

## 2024-01-09 DIAGNOSIS — R06.02 EXERTIONAL SHORTNESS OF BREATH: ICD-10-CM

## 2024-01-09 DIAGNOSIS — R60.0 BILATERAL EDEMA OF LOWER EXTREMITY: ICD-10-CM

## 2024-01-09 DIAGNOSIS — G89.29 OTHER CHRONIC PAIN: ICD-10-CM

## 2024-01-09 DIAGNOSIS — M79.7 FIBROMYALGIA: ICD-10-CM

## 2024-01-09 DIAGNOSIS — F41.0 PANIC ATTACKS: ICD-10-CM

## 2024-01-09 DIAGNOSIS — E78.2 MIXED HYPERLIPIDEMIA: ICD-10-CM

## 2024-01-09 DIAGNOSIS — G47.33 OBSTRUCTIVE SLEEP APNEA: ICD-10-CM

## 2024-01-09 DIAGNOSIS — I26.92 SADDLE EMBOLUS OF PULMONARY ARTERY, UNSPECIFIED CHRONICITY, UNSPECIFIED WHETHER ACUTE COR PULMONALE PRESENT (MULTI): ICD-10-CM

## 2024-01-09 DIAGNOSIS — E03.9 ACQUIRED HYPOTHYROIDISM: ICD-10-CM

## 2024-01-09 DIAGNOSIS — F17.200 CURRENT EVERY DAY SMOKER: ICD-10-CM

## 2024-01-09 DIAGNOSIS — G25.81 RLS (RESTLESS LEGS SYNDROME): ICD-10-CM

## 2024-01-09 DIAGNOSIS — Z01.818 PRE-OPERATIVE EXAMINATION: ICD-10-CM

## 2024-01-09 PROCEDURE — 99213 OFFICE O/P EST LOW 20 MIN: CPT | Performed by: INTERNAL MEDICINE

## 2024-01-09 PROCEDURE — 3074F SYST BP LT 130 MM HG: CPT | Performed by: INTERNAL MEDICINE

## 2024-01-09 PROCEDURE — 3078F DIAST BP <80 MM HG: CPT | Performed by: INTERNAL MEDICINE

## 2024-01-09 PROCEDURE — 4004F PT TOBACCO SCREEN RCVD TLK: CPT | Performed by: INTERNAL MEDICINE

## 2024-01-09 RX ORDER — METOPROLOL TARTRATE 25 MG/1
25 TABLET, FILM COATED ORAL 2 TIMES DAILY
Qty: 180 TABLET | Refills: 3 | Status: SHIPPED | OUTPATIENT
Start: 2024-01-09 | End: 2025-01-08

## 2024-01-09 RX ORDER — CHLORHEXIDINE GLUCONATE 4 %
LIQUID (ML) TOPICAL DAILY PRN
COMMUNITY
Start: 2023-12-14

## 2024-01-09 RX ORDER — CLOTRIMAZOLE AND BETAMETHASONE DIPROPIONATE 10; .64 MG/G; MG/G
1 CREAM TOPICAL 2 TIMES DAILY PRN
COMMUNITY
Start: 2018-11-15

## 2024-01-09 RX ORDER — ROSUVASTATIN CALCIUM 40 MG/1
40 TABLET, COATED ORAL DAILY
Qty: 90 TABLET | Refills: 3 | Status: SHIPPED | OUTPATIENT
Start: 2024-01-09 | End: 2025-01-08

## 2024-01-09 RX ORDER — FUROSEMIDE 20 MG/1
20 TABLET ORAL DAILY
Qty: 90 TABLET | Refills: 3 | Status: SHIPPED | OUTPATIENT
Start: 2024-01-09 | End: 2025-01-08

## 2024-01-09 RX ORDER — POTASSIUM CHLORIDE 20 MEQ/1
20 TABLET, EXTENDED RELEASE ORAL 2 TIMES DAILY
Qty: 180 TABLET | Refills: 3 | Status: SHIPPED | OUTPATIENT
Start: 2024-01-09 | End: 2025-01-08
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
## 2024-02-07 ENCOUNTER — HOSPITAL ENCOUNTER (OUTPATIENT)
Dept: PHYSICAL THERAPY | Age: 59
Setting detail: THERAPIES SERIES
Discharge: HOME | End: 2024-02-07
Payer: COMMERCIAL

## 2024-02-07 PROCEDURE — 97110 THERAPEUTIC EXERCISES: CPT

## 2024-02-07 PROCEDURE — 97162 PT EVAL MOD COMPLEX 30 MIN: CPT

## 2024-02-07 ASSESSMENT — PAIN DESCRIPTION - LOCATION: LOCATION: KNEE

## 2024-02-07 ASSESSMENT — PAIN DESCRIPTION - ORIENTATION: ORIENTATION: LEFT;ANTERIOR;MID

## 2024-02-07 ASSESSMENT — PAIN DESCRIPTION - PAIN TYPE: TYPE: CHRONIC PAIN

## 2024-02-07 ASSESSMENT — PAIN SCALES - GENERAL: PAINLEVEL_OUTOF10: 8

## 2024-02-07 NOTE — PLAN OF CARE
impairments and return to highest level of attainable function.  Therapy Prognosis: Fair    PT Education: Goals;PT Role;Plan of Care;Evaluative findings;Home Exercise Program    PLAN:  Evaluate and Treat  Frequency/Duration:  Plan Frequency: 1-2  Plan weeks: 6-8  Current Treatment Recommendations: Strengthening, ROM, Balance training, Functional mobility training, Transfer training, Gait training, Stair training, Neuromuscular re-education, Manual, Home exercise program, Pain management, Safety education & training, Patient/Caregiver education & training, Equipment evaluation, education, & procurement, Modalities, Positioning, Dry needling, Therapeutic activities, Group Therapy  Modalities: Heat/Cold, Ultrasound, E-stim - unattended, Fluidotherapy, E-stim - manual  Additional Comments: Pt may need 1x per wek for financial reasons     Precautions: Falls risk                   Patient Status:[x] Continue/ Initiate plan of Care     [] Discharge PT.  Recommend pt continue with HEP.      [] Additional visits requested, Please re-certify for additional visits:     [] Hold        Signature: Electronically signed by Emy Gregorio PT on 2/7/24 at 9:43 AM EST    If you have any questions or concerns, please don't hesitate to call.  Thank you for your referral.    I have reviewed this plan of care and certify a need for medically necessary rehabilitation services.    Physician Signature:__________________________________________________________  Date:  Please sign and return

## 2024-02-07 NOTE — PROGRESS NOTES
[facility] Physical Therapy-  [facility]  PHYSICAL THERAPY EVALUATION      Physical Therapy: Initial Evaluation    Patient: Lin Liang (59 y.o.     female)   Examination Date: 2024   :  1965 ;    Confirmed: Yes MRN: [id number]  CSN: [id number]   Insurance: Payor: [employer] / Plan: [employer] / Product Type: *No Product type* /   Insurance ID: [id number] - (Medicare Managed)    Secondary Insurance (if applicable):     Referring Physician: Jeremie Jamil PA       Visits to Date/Visits Approved: 1 /      No Show/Cancelled Appts: 0 / 0     Medical Diagnosis: S/P left knee arthroscopy [Z98.890]        Treatment Diagnosis: decreased L knee AROM, L LE strength, B SLS stability, impaired gait, decreased functional activity tolerance, impaired fucntional mobility, and p/o L knee pain     PERTINENT MEDICAL HISTORY   Patient Assessed for Rehabilitation Services: Yes       Medical History: Chart Reviewed: Yes   Past Medical History:   Diagnosis Date    Acquired hypothyroidism 2010    Adrenal gland anomaly 2013    24 hour urine studies and aldosterone test negative     Chronic pain     Deep venous thrombosis (HCC) 2011    Depression     Essential hypertension     Fibromyalgia     GERD (gastroesophageal reflux disease)     Heat intolerance     Hip pain     History of craniotomy 2007    [facility]    Hyperlipidemia 2009    IBS (irritable bowel syndrome)     Meralgia paraesthetica     Migraine headache     Obesity     Osteopenia     Pedal edema     Sleep apnea     Umbilical hernia     Uterine fibroid      Surgical History:   Past Surgical History:   Procedure Laterality Date     SECTION      x3    COLONOSCOPY  2013    normal    CRANIOTOMY  2007    [facility]    FIBULA FRACTURE SURGERY  2009    ORIF right tibia and fibula     KNEE ARTHROSCOPY Left 2023    Left knee

## 2024-02-09 ENCOUNTER — HOSPITAL ENCOUNTER (OUTPATIENT)
Dept: PHYSICAL THERAPY | Age: 59
Setting detail: THERAPIES SERIES
Discharge: HOME | End: 2024-02-09
Payer: COMMERCIAL

## 2024-02-09 PROCEDURE — 97110 THERAPEUTIC EXERCISES: CPT

## 2024-02-09 ASSESSMENT — PAIN DESCRIPTION - LOCATION: LOCATION: KNEE

## 2024-02-09 ASSESSMENT — PAIN DESCRIPTION - PAIN TYPE: TYPE: CHRONIC PAIN

## 2024-02-09 ASSESSMENT — PAIN DESCRIPTION - ORIENTATION: ORIENTATION: LEFT;ANTERIOR;MID

## 2024-02-09 ASSESSMENT — PAIN SCALES - GENERAL: PAINLEVEL_OUTOF10: 9

## 2024-02-09 ASSESSMENT — PAIN DESCRIPTION - DESCRIPTORS: DESCRIPTORS: ACHING;SHARP

## 2024-02-09 NOTE — PROGRESS NOTES
[facility]  Outpatient Physical Therapy    Treatment Note        Date: 2024  Patient: Lin Liang  : 1965   Confirmed: Yes  MRN: [id number]  Referring Provider: Jeremie Jamil PA    Medical Diagnosis: S/P left knee arthroscopy [Z98.890]       Treatment Diagnosis: decreased L knee AROM, L LE strength, B SLS stability, impaired gait, decreased functional activity tolerance, impaired fucntional mobility, and p/o L knee pain    Visit Information:  Insurance: Payor: [employer] / Plan: [employer] / Product Type: *No Product type* /   PT Visit Information  Onset Date: 23  Total # of Visits to Date: 2  Plan of Care/Certification Expiration Date: 24  No Show: 0  Progress Note Due Date: 24  Canceled Appointment: 0  Progress Note Counter: 2/10 (PN due 3/8/24)    Subjective Information:  Subjective: Pt states that she is having increased pain on this date d/t not sleeping last night.  HEP Compliance:  [x] Good [] Fair [] Poor [] Reports not doing due to:    Pain Screening  Patient Currently in Pain: Yes  Pain Assessment: 0-10  Pain Level: 9  Pain Type: Chronic pain  Pain Location: Knee  Pain Orientation: Left, Anterior, Mid  Pain Descriptors: Aching, Sharp    Treatment:  Exercises:  Exercises  Exercise 1: QS with towel under knee for comfort 5\"x10  Exercise 2: Heel slides sitting and supine 5\"x10  Exercise 3: NS attempted but pt unable to tolerate after 1 minutes at L1, S7, A9  Exercise 4: bridges*  Exercise 5: H/L March x10  Exercise 6: clams*  Exercise 7: seated ex*  Exercise 8: sit to stands*  Exercise 9: sink ex*  Exercise 10: SAQ 10 x 5\"  Exercise 11: PROM L knee x5 minutes  Exercise 19: *Pt needs HOB elevated ~45*  Exercise 20: HEP: cont current  Treatment Reasoning  Limitations addressed: Mobility    Modalities:  Moist Heat (CPT 69483)  Patient Position: Supine  Number Minutes Moist Heat: 10 minutes  Moist heat location: Left,

## 2024-02-12 ENCOUNTER — HOSPITAL ENCOUNTER (OUTPATIENT)
Dept: PHYSICAL THERAPY | Age: 59
Setting detail: THERAPIES SERIES
Discharge: HOME | End: 2024-02-12
Payer: COMMERCIAL

## 2024-02-12 NOTE — PROGRESS NOTES
Therapy                            Cancellation/No-show Note      Date: 2024  Patient: Lin Liang (59 y.o. female)  : 1965  MRN:  [id number]  Referring Physician: Jeremie Jamil PA    Medical Diagnosis: S/P left knee arthroscopy [Z98.890]      Visit Information:  Visits to Date 2   No Show/Cancelled Appts: 0       For today's appointment patient:  [x]  Cancelled  []  Rescheduled appointment  []  No-show   []  Called pt to remind of next appointment     Reason given by patient:  [x]  Patient ill  []  Conflicting appointment  []  No transportation    []  Conflict with work  []  No reason given  []  Other:      [x] Pt has future appointments scheduled, no follow up needed  [] Pt requests to be on hold.    Reason:   If > 2 weeks please discuss with therapist.  [] Therapist to call pt for follow up     Comments:       Signature: Electronically signed by Cesar Molina PTA on 24 at 8:51 AM EST

## 2024-02-13 RX ORDER — CLOTRIMAZOLE AND BETAMETHASONE DIPROPIONATE 10; .64 MG/G; MG/G
CREAM TOPICAL
Qty: 45 G | Refills: 10 | OUTPATIENT
Start: 2024-02-13

## 2024-02-13 NOTE — TELEPHONE ENCOUNTER
Patient is requesting medication refill. Please approve or deny this request.    Rx requested:  Requested Prescriptions     Pending Prescriptions Disp Refills    clotrimazole-betamethasone (LOTRISONE) 1-0.05 % cream [Pharmacy Med Name: CLOTRIM-BETAM 45GM CRM 1-0.05 Cream] 45 g 10     Sig: APPLY TO AFFECTED AREA(S) TOPICALLY TWICE DAILY         Last Office Visit:   9/26/2022      Next Visit Date:  Future Appointments   Date Time Provider Department Center   2/14/2024  9:30 AM Cesar Molina PTA [facility]   2/15/2024  2:15 [facility]   2/19/2024  9:30 AM Alanna Mitchell PTA [facility]   2/22/2024  9:30 AM Ivan Nash PTA [facility]   2/26/2024 10:15 AM Cesar Molina PTA [facility]   2/29/2024  9:30 AM Alex Patetrson PTA [facility]   3/4/2024 10:15 AM Alex Patterson PTA [facility]   3/7/2024  9:30 AM Emy Gregorio, PT [facility]   3/12/2024 10:15 AM Emy Gregorio, PT [facility]   3/15/2024  9:30 AM Emy Gregorio, PT [facility]

## 2024-02-14 ENCOUNTER — HOSPITAL ENCOUNTER (OUTPATIENT)
Dept: PHYSICAL THERAPY | Age: 59
Setting detail: THERAPIES SERIES
Discharge: HOME | End: 2024-02-14
Payer: COMMERCIAL

## 2024-02-14 PROCEDURE — 97110 THERAPEUTIC EXERCISES: CPT

## 2024-02-14 NOTE — PROGRESS NOTES
[facility]  Outpatient Physical Therapy    Treatment Note        Date: 2024  Patient: Lin Liang  : 1965   Confirmed: Yes  MRN: [id number]  Referring Provider: Jeremie Jamil PA    Medical Diagnosis: S/P left knee arthroscopy [Z98.890]       Treatment Diagnosis: decreased L knee AROM, L LE strength, B SLS stability, impaired gait, decreased functional activity tolerance, impaired fucntional mobility, and p/o L knee pain    Visit Information:  Insurance: Payor: [employer] / Plan: [employer]ANS / Product Type: *No Product type* /   PT Visit Information  Onset Date: 23  Total # of Visits to Date: 3  Plan of Care/Certification Expiration Date: 24  No Show: 0  Progress Note Due Date: 24  Canceled Appointment: 1  Progress Note Counter: 3/10 (PN due 3/8/24)    Subjective Information:  Subjective: Pt reports being without \"4 of my main medications since last week.\" Pt states that she should be getting them today by mail. Reports she was unable to get out of bed on Monday.  HEP Compliance:  [x] Good [] Fair [] Poor [] Reports not doing due to:    Pain Screening  Patient Currently in Pain: Yes  Pain Assessment: 0-10  Pain Level: 10 (\"Over 10.\")  Pain Type: Chronic pain  Pain Location: Knee  Pain Orientation: Left, Anterior, Mid  Pain Descriptors: Throbbing, Sharp, Aching    Treatment:  Exercises:  Exercises  Exercise 1: LAQ x 10  Exercise 2: Heel slides sitting 5\"x10  Exercise 3: Seated Hip Abduction (RTB)/adduction x 10  Exercise 4: STS x 5 (slow, increased effort d/t pain levels)  Exercise 5: Bridges x 10  Exercise 19: *Pt needs HOB elevated ~45*  Exercise 20: HEP: HIp abduction/adduction, LAQ, hip bridges     Objective Measures:         LTG 2 Current Status:: 24: L Knee: 0- 90* (HOB at 45*)  Assessment:   Body Structures, Functions, Activity Limitations Requiring Skilled Therapeutic Intervention: Decreased functional mobility ,

## 2024-02-19 ENCOUNTER — HOSPITAL ENCOUNTER (OUTPATIENT)
Dept: PHYSICAL THERAPY | Age: 59
Setting detail: THERAPIES SERIES
Discharge: HOME | End: 2024-02-19
Payer: COMMERCIAL

## 2024-02-19 PROCEDURE — 97110 THERAPEUTIC EXERCISES: CPT

## 2024-02-19 PROCEDURE — 97116 GAIT TRAINING THERAPY: CPT

## 2024-02-19 ASSESSMENT — PAIN DESCRIPTION - LOCATION: LOCATION: KNEE

## 2024-02-19 ASSESSMENT — PAIN DESCRIPTION - ORIENTATION: ORIENTATION: LEFT;ANTERIOR;INNER

## 2024-02-19 ASSESSMENT — PAIN DESCRIPTION - PAIN TYPE: TYPE: CHRONIC PAIN

## 2024-02-19 ASSESSMENT — PAIN SCALES - GENERAL: PAINLEVEL_OUTOF10: 9

## 2024-02-19 ASSESSMENT — PAIN DESCRIPTION - DESCRIPTORS: DESCRIPTORS: BURNING;STABBING

## 2024-02-19 NOTE — PROGRESS NOTES
[facility]  Outpatient Physical Therapy    Treatment Note        Date: 2024  Patient: Lin Liang  : 1965   Confirmed: Yes  MRN: [id number]  Referring Provider: Jeremie Jamil PA    Medical Diagnosis: S/P left knee arthroscopy [Z98.890]       Treatment Diagnosis: decreased L knee AROM, L LE strength, B SLS stability, impaired gait, decreased functional activity tolerance, impaired fucntional mobility, and p/o L knee pain    Visit Information:  Insurance: Payor: [employer] / Plan: [employer] PLANS / Product Type: *No Product type* /   PT Visit Information  Onset Date: 23  Total # of Visits to Date: 4  Plan of Care/Certification Expiration Date: 24  No Show: 0  Progress Note Due Date: 24  Canceled Appointment: 1  Progress Note Counter: 4/10 (PN due 3/8/24)    Subjective Information:  Subjective: Pt reports she is doing a little better today since getting her medications. Notes burning and stabbing in her L knee on the medial side.  HEP Compliance:  [] Good [] Fair [] Poor [] Reports not doing due to:    Pain Screening  Patient Currently in Pain: Yes  Pain Assessment: 0-10  Pain Level: 9  Pain Type: Chronic pain  Pain Location: Knee  Pain Orientation: Left, Anterior, Inner  Pain Descriptors: Burning, Stabbing    Treatment:  Exercises:  Exercises  Exercise 1: LAQ x 10-5\"  Exercise 2: Heel slides sitting/supine(HOB elevated) 5\"x 15-20 ea  Exercise 3: SLR with QS x 10 -3\"  Exercise 4: Gait mechanics exercises in // bars x 5 laps (1-2 UE support)  Exercise 5: STS x 10  Exercise 10: SAQ 10 x 5\"  Exercise 19: *Pt needs HOB elevated ~45*  Exercise 20: HEP: Cont current + SAQ/SLR - verbally discussed, declined handouts.         Modalities:  Moist Heat (CPT 54141)  Patient Position: Supine  Number Minutes Moist Heat: 10 minutes  Moist heat location: Left, Knee  Post treatment skin assessment: Intact  Limitations addressed: Pain modulation

## 2024-02-20 ENCOUNTER — OFFICE VISIT (OUTPATIENT)
Dept: ORTHOPEDIC SURGERY | Age: 59
End: 2024-02-20
Payer: COMMERCIAL

## 2024-02-20 VITALS
WEIGHT: 170 LBS | OXYGEN SATURATION: 95 % | TEMPERATURE: 97.3 F | HEIGHT: 66 IN | BODY MASS INDEX: 27.32 KG/M2 | HEART RATE: 69 BPM

## 2024-02-20 DIAGNOSIS — Z98.890 S/P LEFT KNEE ARTHROSCOPY: Primary | ICD-10-CM

## 2024-02-20 DIAGNOSIS — M17.12 PRIMARY OSTEOARTHRITIS OF LEFT KNEE: ICD-10-CM

## 2024-02-20 PROCEDURE — 20610 DRAIN/INJ JOINT/BURSA W/O US: CPT | Performed by: PHYSICIAN ASSISTANT

## 2024-02-20 PROCEDURE — 99024 POSTOP FOLLOW-UP VISIT: CPT | Performed by: PHYSICIAN ASSISTANT

## 2024-02-20 RX ORDER — TRIAMCINOLONE ACETONIDE 40 MG/ML
80 INJECTION, SUSPENSION INTRA-ARTICULAR; INTRAMUSCULAR ONCE
Status: COMPLETED | OUTPATIENT
Start: 2024-02-20 | End: 2024-02-20

## 2024-02-20 RX ORDER — LIDOCAINE HYDROCHLORIDE 10 MG/ML
8 INJECTION, SOLUTION INFILTRATION; PERINEURAL ONCE
Status: COMPLETED | OUTPATIENT
Start: 2024-02-20 | End: 2024-02-20

## 2024-02-20 RX ADMIN — LIDOCAINE HYDROCHLORIDE 8 ML: 10 INJECTION, SOLUTION INFILTRATION; PERINEURAL at 13:33

## 2024-02-20 RX ADMIN — TRIAMCINOLONE ACETONIDE 80 MG: 40 INJECTION, SUSPENSION INTRA-ARTICULAR; INTRAMUSCULAR at 13:40

## 2024-02-20 NOTE — PROGRESS NOTES
studies  in sterile specimen container as ordered to the lab     Diagnosis Orders   1. S/P left knee arthroscopy        2. Primary osteoarthritis of left knee             Range of motion and rehabilitation exercises discussed with the patient.  Physical Therapy for post-operative rehabilitation.  Full weight bearing.  Patient does have degenerative arthritis of the left knee as well as previous medial meniscal tear.  I injected the patient's knee today with cortisone.  She will continue in physical therapy.  She is to follow-up with me in about 3 weeks.  Follow up: 3 weeks.

## 2024-02-22 ENCOUNTER — HOSPITAL ENCOUNTER (OUTPATIENT)
Dept: PHYSICAL THERAPY | Age: 59
Setting detail: THERAPIES SERIES
Discharge: HOME | End: 2024-02-22
Payer: COMMERCIAL

## 2024-02-22 PROCEDURE — 97110 THERAPEUTIC EXERCISES: CPT

## 2024-02-22 ASSESSMENT — PAIN DESCRIPTION - LOCATION: LOCATION: KNEE

## 2024-02-22 ASSESSMENT — PAIN SCALES - GENERAL: PAINLEVEL_OUTOF10: 9

## 2024-02-22 ASSESSMENT — PAIN DESCRIPTION - DESCRIPTORS: DESCRIPTORS: THROBBING;STABBING;BURNING

## 2024-02-22 ASSESSMENT — PAIN DESCRIPTION - ORIENTATION: ORIENTATION: ANTERIOR;LEFT;INNER

## 2024-02-22 ASSESSMENT — PAIN DESCRIPTION - PAIN TYPE: TYPE: CHRONIC PAIN

## 2024-02-22 NOTE — PROGRESS NOTES
progress   LTG 2 The pt will demo improved L knee AROM >/=0-115* in order to increase ease of ADL's In progress   LTG 3 The pt will demo improved L LE strength >/= 4/5 in order to safely ambulate without AD with decreased pain/limitations In progress   LTG 4 The pt will be indep/compliant with HEP in order to self-manage symptoms upon D/C In progress   LTG 5 The pt will demo improved B SLS >/=4 seconds to increase ease and safety with ambulation In progress   LTG 6 The pt will perform stairs non-reciprocally mod indep with 1 HR and LRD In progress            Plan:  Frequency/Duration:  Plan  Plan Frequency: 1-2  Plan weeks: 6-8  Current Treatment Recommendations: Strengthening, ROM, Balance training, Functional mobility training, Transfer training, Gait training, Stair training, Neuromuscular re-education, Manual, Home exercise program, Pain management, Safety education & training, Patient/Caregiver education & training, Equipment evaluation, education, & procurement, Modalities, Positioning, Dry needling, Therapeutic activities, Group Therapy  Modalities: Heat/Cold, Ultrasound, E-stim - unattended, Fluidotherapy, E-stim - manual  Additional Comments: Pt may need 1x per wek for financial reasons  Pt to continue current HEP.  See objective section for any therapeutic exercise changes, additions or modifications this date.    Therapy Time:      PT Individual Minutes  Time In: 0929  Time Out: 1016  Minutes: 47  Timed Code Treatment Minutes: 47 Minutes  Procedure Minutes: 0  Timed Activity Minutes Units   Ther Ex  47 3     Electronically signed by Ivan Nash PTA on 2/22/24 at 10:30 AM EST

## 2024-02-26 ENCOUNTER — HOSPITAL ENCOUNTER (OUTPATIENT)
Dept: PHYSICAL THERAPY | Age: 59
Setting detail: THERAPIES SERIES
Discharge: HOME | End: 2024-02-26
Payer: COMMERCIAL

## 2024-02-26 NOTE — PROGRESS NOTES
Therapy                            Cancellation/No-show Note      Date: 2024  Patient: Lin Liang (59 y.o. female)  : 1965  MRN:  [id number]  Referring Physician: Jeremie Jamil PA    Medical Diagnosis: S/P left knee arthroscopy [Z98.890]      Visit Information:  Visits to Date 5   No Show/Cancelled Appts: 0 / 2      For today's appointment patient:  [x]  Cancelled  []  Rescheduled appointment  []  No-show   []  Called pt to remind of next appointment     Reason given by patient:  []  Patient ill  []  Conflicting appointment  []  No transportation    []  Conflict with work  []  No reason given  [x]  Other:  \"Not able to make it.\"    [x] Pt has future appointments scheduled, no follow up needed  [] Pt requests to be on hold.    Reason:   If > 2 weeks please discuss with therapist.  [] Therapist to call pt for follow up     Comments:       Signature: Electronically signed by Cesar Molina PTA on 24 at 9:38 AM EST

## 2024-02-29 ENCOUNTER — HOSPITAL ENCOUNTER (OUTPATIENT)
Dept: PHYSICAL THERAPY | Age: 59
Setting detail: THERAPIES SERIES
Discharge: HOME | End: 2024-02-29
Payer: COMMERCIAL

## 2024-02-29 PROCEDURE — 97110 THERAPEUTIC EXERCISES: CPT

## 2024-02-29 ASSESSMENT — PAIN DESCRIPTION - LOCATION: LOCATION: KNEE

## 2024-02-29 ASSESSMENT — PAIN DESCRIPTION - ORIENTATION: ORIENTATION: LEFT

## 2024-02-29 ASSESSMENT — PAIN DESCRIPTION - PAIN TYPE: TYPE: CHRONIC PAIN

## 2024-02-29 ASSESSMENT — PAIN SCALES - GENERAL: PAINLEVEL_OUTOF10: 7

## 2024-02-29 ASSESSMENT — PAIN DESCRIPTION - DESCRIPTORS: DESCRIPTORS: ACHING

## 2024-02-29 NOTE — PROGRESS NOTES
[facility]  Outpatient Physical Therapy    Treatment Note        Date: 2024  Patient: Lin Liang  : 1965   Confirmed: Yes  MRN: [id number]  Referring Provider: Jeremie Jamil PA    Medical Diagnosis: S/P left knee arthroscopy [Z98.890]       Treatment Diagnosis: decreased L knee AROM, L LE strength, B SLS stability, impaired gait, decreased functional activity tolerance, impaired fucntional mobility, and p/o L knee pain    Visit Information:  Insurance: Payor: [employer] / Plan: [employer]ANS / Product Type: *No Product type* /   PT Visit Information  Onset Date: 23  Total # of Visits to Date: 6  Plan of Care/Certification Expiration Date: 24  No Show: 0  Progress Note Due Date: 24  Canceled Appointment: 2  Progress Note Counter: 6/10 (PN due 3/8/24)    Subjective Information:  Subjective: Pt reports pain of 7/10, less stabbing pains and more achy currently. Pt reports good compliance with HEP  HEP Compliance:  [x] Good [] Fair [] Poor [] Reports not doing due to:    Pain Screening  Patient Currently in Pain: Yes  Pain Assessment: 0-10  Pain Level: 7  Pain Type: Chronic pain  Pain Location: Knee  Pain Orientation: Left  Pain Descriptors: Aching    Treatment:  Exercises:  Exercises  Exercise 1: LAQ w/ 2# x 15 -5\"   /   HS curls RTB x 15 B LE  Exercise 2: Seated Hip ADD ball squeeze 5\" x 15 / Hip ABD  RTB 5\" x 15  Exercise 7: Nustep: Seat 11, UE 10 Lvl 1 x 5 min.  Exercise 20: HEP: continue with current.         Objective Measures:              LTG 2 Current Status:: 24: L Knee: Flex  114* Measured in seated.     LTG 4 Current Status:: 24: Pt reports good complianc with HEP             Assessment:   Body Structures, Functions, Activity Limitations Requiring Skilled Therapeutic Intervention: Decreased functional mobility , Decreased ROM, Decreased ADL status, Decreased strength, Decreased balance, Decreased endurance,

## 2024-03-04 ENCOUNTER — HOSPITAL ENCOUNTER (OUTPATIENT)
Dept: PHYSICAL THERAPY | Age: 59
Setting detail: THERAPIES SERIES
Discharge: HOME | End: 2024-03-04
Payer: COMMERCIAL

## 2024-03-04 PROCEDURE — 97110 THERAPEUTIC EXERCISES: CPT

## 2024-03-04 ASSESSMENT — PAIN DESCRIPTION - DESCRIPTORS: DESCRIPTORS: ACHING

## 2024-03-04 ASSESSMENT — PAIN SCALES - GENERAL: PAINLEVEL_OUTOF10: 8

## 2024-03-04 ASSESSMENT — PAIN DESCRIPTION - LOCATION: LOCATION: KNEE

## 2024-03-04 ASSESSMENT — PAIN DESCRIPTION - ORIENTATION: ORIENTATION: RIGHT;LEFT

## 2024-03-04 ASSESSMENT — PAIN DESCRIPTION - PAIN TYPE: TYPE: CHRONIC PAIN

## 2024-03-04 NOTE — PROGRESS NOTES
[facility]  Outpatient Physical Therapy    Treatment Note        Date: 3/5/2024  Patient: Lin Liang  : 1965   Confirmed: Yes  MRN: [id number]  Referring Provider: Jeremie Jamil PA    Medical Diagnosis: S/P left knee arthroscopy [Z98.890]       Treatment Diagnosis: decreased L knee AROM, L LE strength, B SLS stability, impaired gait, decreased functional activity tolerance, impaired fucntional mobility, and p/o L knee pain    Visit Information:  Insurance: Payor: [employer] / Plan: DEV[employer]ED [employer] PLANS / Product Type: *No Product type* /   PT Visit Information  Onset Date: 23  Total # of Visits to Date: 7  Plan of Care/Certification Expiration Date: 24  No Show: 0  Progress Note Due Date: 24  Canceled Appointment: 2  Progress Note Counter: 7/10 (PN due 3/8/24)    Subjective Information:  Subjective: Pt reports elevated pain levels lasting the rest of the day, returned to normal pain levels by next day.  HEP Compliance:  [x] Good [] Fair [] Poor [] Reports not doing due to:    Pain Screening  Patient Currently in Pain: Yes  Pain Assessment: 0-10  Pain Level: 8  Pain Type: Chronic pain  Pain Location: Knee  Pain Orientation: Right, Left  Pain Descriptors: Aching    Treatment:  Exercises:  Exercises  Exercise 1: LAQ w/ 2# x 15 -5\"   /   HS curls RTB x 15 B LE / Seated March w/ 2# x 15  Exercise 2: Seated Hip ADD ball squeeze 5\" x 15 / Hip ABD  RTB 5\" x 15  Exercise 7: Nustep: Seat 11, UE 10 Lvl 2 x 6 min.  Exercise 8: sit to stands x 10  Exercise 20: HEP: continue with current.         Objective Measures:             Assessment:   Body Structures, Functions, Activity Limitations Requiring Skilled Therapeutic Intervention: Decreased functional mobility , Decreased ROM, Decreased ADL status, Decreased strength, Decreased balance, Decreased endurance, Decreased high-level IADLs, Increased pain  Assessment: No changes with current program as pt

## 2024-03-07 ENCOUNTER — HOSPITAL ENCOUNTER (OUTPATIENT)
Dept: PHYSICAL THERAPY | Age: 59
Setting detail: THERAPIES SERIES
Discharge: HOME | End: 2024-03-07
Payer: COMMERCIAL

## 2024-03-07 NOTE — PROGRESS NOTES
Therapy                            Cancellation/No-show Note      Date: 2024  Patient: Lin Liang (59 y.o. female)  : 1965  MRN:  [id number]  Referring Physician: Jeremie Jamil PA    Medical Diagnosis: S/P left knee arthroscopy [Z98.890]      Visit Information:  Visits to Date 7   No Show/Cancelled Appts: 0  3      For today's appointment patient:  [x]  Cancelled  []  Rescheduled appointment  []  No-show   []  Called pt to remind of next appointment     Reason given by patient:  [x]  Patient ill  []  Conflicting appointment  []  No transportation    []  Conflict with work  []  No reason given  []  Other:      [x] Pt has future appointments scheduled, no follow up needed  [] Pt requests to be on hold.    Reason:   If > 2 weeks please discuss with therapist.  [] Therapist to call pt for follow up     Comments:    Signature: Electronically signed by Madai Magana PT on 3/7/24 at 8:08 AM EST

## 2024-03-12 ENCOUNTER — HOSPITAL ENCOUNTER (OUTPATIENT)
Dept: PHYSICAL THERAPY | Age: 59
Setting detail: THERAPIES SERIES
Discharge: HOME | End: 2024-03-12
Payer: COMMERCIAL

## 2024-03-12 ENCOUNTER — OFFICE VISIT (OUTPATIENT)
Dept: ORTHOPEDIC SURGERY | Age: 59
End: 2024-03-12

## 2024-03-12 VITALS
OXYGEN SATURATION: 98 % | HEART RATE: 80 BPM | TEMPERATURE: 98.7 F | BODY MASS INDEX: 27.32 KG/M2 | HEIGHT: 66 IN | WEIGHT: 170 LBS

## 2024-03-12 DIAGNOSIS — M17.12 PRIMARY OSTEOARTHRITIS OF LEFT KNEE: ICD-10-CM

## 2024-03-12 DIAGNOSIS — Z98.890 S/P LEFT KNEE ARTHROSCOPY: Primary | ICD-10-CM

## 2024-03-12 PROCEDURE — 97110 THERAPEUTIC EXERCISES: CPT

## 2024-03-12 PROCEDURE — 99024 POSTOP FOLLOW-UP VISIT: CPT | Performed by: PHYSICIAN ASSISTANT

## 2024-03-12 ASSESSMENT — PAIN DESCRIPTION - PAIN TYPE: TYPE: CHRONIC PAIN

## 2024-03-12 ASSESSMENT — PAIN DESCRIPTION - DESCRIPTORS: DESCRIPTORS: ACHING

## 2024-03-12 ASSESSMENT — PAIN DESCRIPTION - LOCATION: LOCATION: KNEE

## 2024-03-12 ASSESSMENT — PAIN DESCRIPTION - ORIENTATION: ORIENTATION: LEFT

## 2024-03-12 ASSESSMENT — PAIN SCALES - GENERAL: PAINLEVEL_OUTOF10: 7

## 2024-03-12 NOTE — PROGRESS NOTES
Subjective:      Lin Liang is here for follow-up after left total knee replacement surgery. Findings at surgery: medial meniscus tear. Pain is controlled with current analgesics.  Medication(s) being used: acetaminophen. The patient denies fever, wound drainage, increasing redness, pus, increasing pain, increasing swelling. Post op problems reported: none She is ambulating with a crutch for the opposite knee she is doing better after cortisone injection in the left knee.       Objective:         General :    alert, appears stated age, and cooperative   Gait:  Antalgic.   Sutures:   Sutures out.   Incision:  healing well, no significant drainage, no dehiscence, no significant erythema   Tenderness:  none   Flexion ROM:  full range of motion   Extension ROM:  full range of motion   Effusion:  mild   DVT Evaluation:  No evidence of DVT seen on physical exam.     Imaging  None performed at this visit        Assessment:      Status post left knee arthroscopy Postoperative course complicated by continued pain and was improved with intra-articular cortisone injection      Plan:      Range of motion and rehabilitation exercises discussed with the patient.  Physical Therapy for post-operative rehabilitation.  Follow-up with Dr. Esposito to discuss right total knee replacement  Follow up: As needed .

## 2024-03-12 NOTE — PROGRESS NOTES
[facility]  PHYSICAL THERAPY PLAN OF CARE   [address] [place] [address]         Phone: [phone]  Fax: [phone]    [] Certification  [] Recertification []  Plan of Care  [x] Progress Note [] Discharge      Referring Provider: Jeremie Jamil PA     From:  Emy Gregorio, PT, DPT  Patient: Lin Liang (59 y.o. female) : 1965 Date: 3/12/2024  Medical Diagnosis: S/P left knee arthroscopy [Z98.890]       Treatment Diagnosis: decreased L knee AROM, L LE strength, B SLS stability, impaired gait, decreased functional activity tolerance, impaired fucntional mobility, and p/o L knee pain    Plan of Care/Certification Expiration Date: 24   Progress Report Period from:  2024  to 3/12/2024    Visits to Date: 8 No Show: 0 Cancelled Appts: 3    OBJECTIVE:   Short Term Goals - Time Frame for Short Term Goals: 2-3 weeks    Goals Current/Discharge status  Status   Short Term Goal 1: The pt will perform bed mobility/transfers indep with decreased effort to increase functional indep  STG 1 Current Status:: 3/12/24:  Performs chair transfers with heavy guarding. Required assistance with upper trunk for bed mobility this date d/t LBP   In progress   Long Term Goals - Time Frame for Long Term Goals : 6-8 weeks  Goals Current/ Discharge status Status   Long Term Goal 1: The pt will have an increase in LEFS score >/=40/80 points in order to increase functional activity tolerance LTG 1 Current Status:: 3/12/24: LEFS: 36/80   In progress   Long Term Goal 2: The pt will demo improved L knee AROM >/=0-115* in order to increase ease of ADL's LTG 2 Current Status:: 3/12/24: L Knee Flex: 132*   Met   Long Term Goal 3: The pt will demo improved L LE strength >/= 4/5 in order to safely ambulate without AD with decreased pain/limitations LTG 3 Current Status:: 3/12/24: Strength LLE  L Hip Flexion: 4-/5   L Hip Internal Rotation: 3+/5  L Hip External Rotation: 3+/5  L 
Ex 39 3   Electronically signed by Cesar Molina PTA on 3/12/24 at 2:25 PM EDT

## 2024-03-18 ENCOUNTER — OFFICE VISIT (OUTPATIENT)
Dept: ORTHOPEDIC SURGERY | Age: 59
End: 2024-03-18

## 2024-03-18 ENCOUNTER — HOSPITAL ENCOUNTER (OUTPATIENT)
Dept: PHYSICAL THERAPY | Age: 59
Setting detail: THERAPIES SERIES
Discharge: HOME | End: 2024-03-18
Payer: COMMERCIAL

## 2024-03-18 VITALS
HEIGHT: 66 IN | BODY MASS INDEX: 27.32 KG/M2 | TEMPERATURE: 97 F | OXYGEN SATURATION: 97 % | WEIGHT: 170 LBS | HEART RATE: 77 BPM

## 2024-03-18 DIAGNOSIS — M17.11 PRIMARY OSTEOARTHRITIS OF RIGHT KNEE: Primary | ICD-10-CM

## 2024-03-18 PROCEDURE — 99215 OFFICE O/P EST HI 40 MIN: CPT | Performed by: ORTHOPAEDIC SURGERY

## 2024-03-18 PROCEDURE — 97110 THERAPEUTIC EXERCISES: CPT

## 2024-03-18 ASSESSMENT — PAIN DESCRIPTION - DESCRIPTORS: DESCRIPTORS: ACHING

## 2024-03-18 ASSESSMENT — PAIN SCALES - GENERAL: PAINLEVEL_OUTOF10: 7

## 2024-03-18 ASSESSMENT — PAIN DESCRIPTION - ORIENTATION: ORIENTATION: LEFT

## 2024-03-18 ASSESSMENT — PAIN DESCRIPTION - PAIN TYPE: TYPE: CHRONIC PAIN

## 2024-03-18 ASSESSMENT — PAIN DESCRIPTION - LOCATION: LOCATION: KNEE

## 2024-03-18 NOTE — PROGRESS NOTES
pain/limitations In progress   LTG 4 The pt will be indep/compliant with HEP in order to self-manage symptoms upon D/C In progress, Partially met   LTG 5 The pt will demo improved B SLS >/=4 seconds to increase ease and safety with ambulation In progress   LTG 6 The pt will perform stairs non-reciprocally mod indep with 1 HR and LRD In progress               Plan:  Frequency/Duration:  Plan  Plan Frequency: 1-2  Plan weeks: 6-8  Current Treatment Recommendations: Strengthening, ROM, Balance training, Functional mobility training, Transfer training, Gait training, Stair training, Neuromuscular re-education, Manual, Home exercise program, Pain management, Safety education & training, Patient/Caregiver education & training, Equipment evaluation, education, & procurement, Modalities, Positioning, Dry needling, Therapeutic activities, Group Therapy  Modalities: Heat/Cold, Ultrasound, E-stim - unattended, Fluidotherapy, E-stim - manual  Additional Comments: Pt may need 1x per wek for financial reasons  Pt to continue current HEP.  See objective section for any therapeutic exercise changes, additions or modifications this date.    Therapy Time:      PT Individual Minutes  Time In: 1249  Time Out: 1328  Minutes: 39  Timed Code Treatment Minutes: 39 Minutes  Procedure Minutes: 0  Timed Activity Minutes Units   Ther Ex  39 3     Electronically signed by Ivan Nash PTA on 3/18/24 at 1:48 PM EDT

## 2024-03-18 NOTE — PROGRESS NOTES
Allergies: Latex, Bee pollen, Niaspan [niacin er], Simvastatin, Statins, Aspirin, Dust mite extract, and Niacin   [x] IV Start with J-loop     [x] ANCEF 2 gram IVPB. If > 120 kg then 3 grams IVPB within 1 hour of incision.  [x] TXA Protocol   [x] NPO   [x] Sequential compression device preferred  []Other: ______________________________________________________    PAT  [x] [facility] PAT Date/Time:                                                            [] Medical and Cardiac clearance need. Clearance done by  ___________________  [] Other: ____________________________________________________      Physician Signature Required: Alex Esposito DO Date/Time: 3/18/2024     -------------------------------------------------------------------------------------------------------------        Past Medical History:   Diagnosis Date    Acquired hypothyroidism 2010    Adrenal gland anomaly 2013    24 hour urine studies and aldosterone test negative     Chronic pain     Deep venous thrombosis (HCC) 2011    Depression     Essential hypertension     Fibromyalgia     GERD (gastroesophageal reflux disease)     Heat intolerance     Hip pain     History of craniotomy 2007    [facility]    Hyperlipidemia 2009    IBS (irritable bowel syndrome)     Meralgia paraesthetica     Migraine headache     Obesity     Osteopenia     Pedal edema     Sleep apnea     Umbilical hernia     Uterine fibroid      Past Surgical History:   Procedure Laterality Date     SECTION      x3    COLONOSCOPY  2013    normal    CRANIOTOMY  2007    [facility] [place]    FIBULA FRACTURE SURGERY  2009    ORIF right tibia and fibula     KNEE ARTHROSCOPY Left 2023    Left knee diagnostic arthroscopy with partial medial meniscectomy. performed by [place]ito [place] [facility]    ORIF FEMUR DECOMPRESSION      right    PATELLA SURGERY  2007    partial resection right patella

## 2024-03-22 ENCOUNTER — TELEPHONE (OUTPATIENT)
Dept: ORTHOPEDIC SURGERY | Age: 59
End: 2024-03-22

## 2024-03-22 DIAGNOSIS — M17.11 PRIMARY OSTEOARTHRITIS OF RIGHT KNEE: Primary | ICD-10-CM

## 2024-03-26 ENCOUNTER — HOSPITAL ENCOUNTER (OUTPATIENT)
Dept: CT IMAGING | Age: 59
Discharge: HOME | End: 2024-03-28
Attending: ORTHOPAEDIC SURGERY
Payer: COMMERCIAL

## 2024-03-26 DIAGNOSIS — M17.11 PRIMARY OSTEOARTHRITIS OF RIGHT KNEE: ICD-10-CM

## 2024-03-26 PROCEDURE — 73700 CT LOWER EXTREMITY W/O DYE: CPT

## 2024-04-05 ENCOUNTER — PREP FOR PROCEDURE (OUTPATIENT)
Dept: ORTHOPEDIC SURGERY | Age: 59
End: 2024-04-05

## 2024-04-05 PROBLEM — M17.11 OSTEOARTHRITIS OF RIGHT KNEE: Status: ACTIVE | Noted: 2024-04-05

## 2024-04-09 ENCOUNTER — TELEPHONE (OUTPATIENT)
Dept: ORTHOPEDIC SURGERY | Age: 59
End: 2024-04-09

## 2024-04-09 NOTE — TELEPHONE ENCOUNTER
VM box was full, unable to leave message. [employer] message and letter has been mailed out to patient with surgery details.

## 2024-04-25 ENCOUNTER — HOSPITAL ENCOUNTER (OUTPATIENT)
Dept: PREADMISSION TESTING | Age: 59
Discharge: HOME | End: 2024-04-29
Payer: COMMERCIAL

## 2024-04-25 ENCOUNTER — OFFICE VISIT (OUTPATIENT)
Dept: ORTHOPEDIC SURGERY | Age: 59
End: 2024-04-25

## 2024-04-25 VITALS
TEMPERATURE: 97.1 F | HEIGHT: 65 IN | OXYGEN SATURATION: 97 % | WEIGHT: 182 LBS | BODY MASS INDEX: 30.32 KG/M2 | DIASTOLIC BLOOD PRESSURE: 80 MMHG | HEART RATE: 77 BPM | SYSTOLIC BLOOD PRESSURE: 139 MMHG

## 2024-04-25 DIAGNOSIS — N30.90 CYSTITIS: ICD-10-CM

## 2024-04-25 DIAGNOSIS — M17.11 PRIMARY OSTEOARTHRITIS OF RIGHT KNEE: ICD-10-CM

## 2024-04-25 DIAGNOSIS — Z01.818 PRE-OP EXAM: ICD-10-CM

## 2024-04-25 DIAGNOSIS — R73.9 ELEVATED BLOOD SUGAR: ICD-10-CM

## 2024-04-25 DIAGNOSIS — Z01.818 PRE-OP EXAM: Primary | ICD-10-CM

## 2024-04-25 LAB
ABO + RH BLD: NORMAL
ANION GAP SERPL CALCULATED.3IONS-SCNC: 8 MEQ/L (ref 9–15)
BASOPHILS # BLD: 0 K/UL (ref 0–0.2)
BASOPHILS NFR BLD: 0.5 %
BILIRUB UR QL STRIP: NEGATIVE
BLD GP AB SCN SERPL QL: NORMAL
BUN SERPL-MCNC: 11 MG/DL (ref 8–23)
CALCIUM SERPL-MCNC: 8.7 MG/DL (ref 8.5–9.9)
CHLORIDE SERPL-SCNC: 111 MEQ/L (ref 95–107)
CLARITY UR: CLEAR
CO2 SERPL-SCNC: 23 MEQ/L (ref 20–31)
COLOR UR: ABNORMAL
CREAT SERPL-MCNC: 0.78 MG/DL (ref 0.5–0.9)
EOSINOPHIL # BLD: 0 K/UL (ref 0–0.7)
EOSINOPHIL NFR BLD: 0 %
ERYTHROCYTE [DISTWIDTH] IN BLOOD BY AUTOMATED COUNT: 14.8 % (ref 11.5–14.5)
GLUCOSE SERPL-MCNC: 115 MG/DL (ref 70–99)
GLUCOSE UR STRIP-MCNC: NEGATIVE MG/DL
HCT VFR BLD AUTO: 41.3 % (ref 37–47)
HGB BLD-MCNC: 14 G/DL (ref 12–16)
HGB UR QL STRIP: NEGATIVE
KETONES UR STRIP-MCNC: ABNORMAL MG/DL
LEUKOCYTE ESTERASE UR QL STRIP: NEGATIVE
LYMPHOCYTES # BLD: 1.3 K/UL (ref 1–4.8)
LYMPHOCYTES NFR BLD: 29.7 %
MCH RBC QN AUTO: 30.9 PG (ref 27–31.3)
MCHC RBC AUTO-ENTMCNC: 33.9 % (ref 33–37)
MCV RBC AUTO: 91.2 FL (ref 79.4–94.8)
MONOCYTES # BLD: 0.3 K/UL (ref 0.2–0.8)
MONOCYTES NFR BLD: 7.1 %
NEUTROPHILS # BLD: 2.7 K/UL (ref 1.4–6.5)
NEUTS SEG NFR BLD: 62.5 %
NITRITE UR QL STRIP: NEGATIVE
PH UR STRIP: 7 [PH] (ref 5–9)
PLATELET # BLD AUTO: 224 K/UL (ref 130–400)
POTASSIUM SERPL-SCNC: 3.6 MEQ/L (ref 3.4–4.9)
PROT UR STRIP-MCNC: ABNORMAL MG/DL
RBC # BLD AUTO: 4.53 M/UL (ref 4.2–5.4)
SODIUM SERPL-SCNC: 142 MEQ/L (ref 135–144)
SP GR UR STRIP: 1.03 (ref 1–1.03)
UROBILINOGEN UR STRIP-ACNC: 1 E.U./DL
WBC # BLD AUTO: 4.3 K/UL (ref 4.8–10.8)

## 2024-04-25 PROCEDURE — PREOPEXAM PRE-OP EXAM: Performed by: PHYSICIAN ASSISTANT

## 2024-04-25 PROCEDURE — 87641 MR-STAPH DNA AMP PROBE: CPT

## 2024-04-25 PROCEDURE — 83036 HEMOGLOBIN GLYCOSYLATED A1C: CPT

## 2024-04-25 PROCEDURE — 80048 BASIC METABOLIC PNL TOTAL CA: CPT

## 2024-04-25 PROCEDURE — 81003 URINALYSIS AUTO W/O SCOPE: CPT

## 2024-04-25 PROCEDURE — 86900 BLOOD TYPING SEROLOGIC ABO: CPT

## 2024-04-25 PROCEDURE — 86850 RBC ANTIBODY SCREEN: CPT

## 2024-04-25 PROCEDURE — 86901 BLOOD TYPING SEROLOGIC RH(D): CPT

## 2024-04-25 PROCEDURE — 87086 URINE CULTURE/COLONY COUNT: CPT

## 2024-04-25 PROCEDURE — 85025 COMPLETE CBC W/AUTO DIFF WBC: CPT

## 2024-04-25 RX ORDER — SODIUM CHLORIDE, SODIUM LACTATE, POTASSIUM CHLORIDE, CALCIUM CHLORIDE 600; 310; 30; 20 MG/100ML; MG/100ML; MG/100ML; MG/100ML
INJECTION, SOLUTION INTRAVENOUS CONTINUOUS
OUTPATIENT
Start: 2024-04-25

## 2024-04-25 RX ORDER — SODIUM CHLORIDE 9 MG/ML
INJECTION, SOLUTION INTRAVENOUS PRN
OUTPATIENT
Start: 2024-04-25

## 2024-04-25 RX ORDER — SODIUM CHLORIDE 0.9 % (FLUSH) 0.9 %
5-40 SYRINGE (ML) INJECTION PRN
OUTPATIENT
Start: 2024-04-25

## 2024-04-25 RX ORDER — SODIUM CHLORIDE 0.9 % (FLUSH) 0.9 %
5-40 SYRINGE (ML) INJECTION EVERY 12 HOURS SCHEDULED
OUTPATIENT
Start: 2024-04-25

## 2024-04-25 NOTE — H&P
organomegaly  Extremities: extremities normal, atraumatic, no cyanosis or edema  Pulses: 2+ and symmetric  Skin: Skin color, texture, turgor normal. No rashes or lesions  Lymph nodes: Cervical, supraclavicular, and axillary nodes normal.  Neurologic: Grossly normal    Imaging Review  Plain radiographs demonstrate severe degenerative joint disease of the right knee. The overall alignment is mild varus. The bone quality appears to be good for age and reported activity level.      Assessment:     End stage arthritis, right knee     Plan:     The patient history, physical examination and imaging studies are consistent with advanced degenerative joint disease of the right knee. The treatment options including medical management, injection therapy arthroscopy and arthroplasty were discussed at length. The risks and benefits of total knee arthroplasty were presented and reviewed. The risks due to aseptic loosening, infection, stiffness, patella tracking problems, thromboembolic complications among others were discussed. The patient acknowledged the explanation, agreed to proceed with the plan and a consent was signed.  Patient previously consented to right total knee arthroplasty to be performed by Dr. Alex leroy May 1, 2024 at [facility].  Patient understands to bathe daily with Hibiclens soap for 5 days prior to surgery.  She understands to take her metoprolol the morning of surgery but no other medications.  She will bring her walker with her the morning of surgery.

## 2024-04-26 LAB
BACTERIA UR CULT: NORMAL
MRSA, DNA, NASAL: ABNORMAL
SPECIMEN DESCRIPTION: ABNORMAL

## 2024-05-01 ENCOUNTER — ANESTHESIA EVENT (OUTPATIENT)
Dept: OPERATING ROOM | Age: 59
End: 2024-05-01
Payer: COMMERCIAL

## 2024-05-01 ENCOUNTER — HOSPITAL ENCOUNTER (OUTPATIENT)
Age: 59
Setting detail: OUTPATIENT SURGERY
Discharge: HOME | End: 2024-05-01
Attending: ORTHOPAEDIC SURGERY | Admitting: ORTHOPAEDIC SURGERY
Payer: COMMERCIAL

## 2024-05-01 ENCOUNTER — ANESTHESIA (OUTPATIENT)
Dept: OPERATING ROOM | Age: 59
End: 2024-05-01
Payer: COMMERCIAL

## 2024-05-01 VITALS
WEIGHT: 193 LBS | HEART RATE: 66 BPM | RESPIRATION RATE: 18 BRPM | DIASTOLIC BLOOD PRESSURE: 83 MMHG | TEMPERATURE: 97 F | OXYGEN SATURATION: 97 % | HEIGHT: 66 IN | BODY MASS INDEX: 31.02 KG/M2 | SYSTOLIC BLOOD PRESSURE: 132 MMHG

## 2024-05-01 LAB
GLUCOSE BLD-MCNC: 96 MG/DL (ref 70–99)
PERFORMED ON: NORMAL

## 2024-05-01 PROCEDURE — 2580000003 HC RX 258: Performed by: ANESTHESIOLOGY

## 2024-05-01 PROCEDURE — 7100000011 HC PHASE II RECOVERY - ADDTL 15 MIN: Performed by: ORTHOPAEDIC SURGERY

## 2024-05-01 PROCEDURE — 2580000003 HC RX 258: Performed by: PHYSICIAN ASSISTANT

## 2024-05-01 PROCEDURE — 3700000000 HC ANESTHESIA ATTENDED CARE: Performed by: ORTHOPAEDIC SURGERY

## 2024-05-01 PROCEDURE — 7100000010 HC PHASE II RECOVERY - FIRST 15 MIN: Performed by: ORTHOPAEDIC SURGERY

## 2024-05-01 PROCEDURE — 7100000000 HC PACU RECOVERY - FIRST 15 MIN: Performed by: ORTHOPAEDIC SURGERY

## 2024-05-01 PROCEDURE — 6360000002 HC RX W HCPCS: Performed by: ANESTHESIOLOGY

## 2024-05-01 PROCEDURE — 64447 NJX AA&/STRD FEMORAL NRV IMG: CPT | Performed by: ANESTHESIOLOGY

## 2024-05-01 PROCEDURE — 6370000000 HC RX 637 (ALT 250 FOR IP): Performed by: NURSE PRACTITIONER

## 2024-05-01 PROCEDURE — 2709999900 HC NON-CHARGEABLE SUPPLY: Performed by: ORTHOPAEDIC SURGERY

## 2024-05-01 PROCEDURE — 2500000003 HC RX 250 WO HCPCS: Performed by: ANESTHESIOLOGY

## 2024-05-01 PROCEDURE — 3600000015 HC SURGERY LEVEL 5 ADDTL 15MIN: Performed by: ORTHOPAEDIC SURGERY

## 2024-05-01 PROCEDURE — 64999 UNLISTED PX NERVOUS SYSTEM: CPT | Performed by: ANESTHESIOLOGY

## 2024-05-01 PROCEDURE — 2720000010 HC SURG SUPPLY STERILE: Performed by: ORTHOPAEDIC SURGERY

## 2024-05-01 PROCEDURE — 3600000005 HC SURGERY LEVEL 5 BASE: Performed by: ORTHOPAEDIC SURGERY

## 2024-05-01 PROCEDURE — 3700000001 HC ADD 15 MINUTES (ANESTHESIA): Performed by: ORTHOPAEDIC SURGERY

## 2024-05-01 PROCEDURE — 7100000001 HC PACU RECOVERY - ADDTL 15 MIN: Performed by: ORTHOPAEDIC SURGERY

## 2024-05-01 PROCEDURE — 6360000002 HC RX W HCPCS: Performed by: PHYSICIAN ASSISTANT

## 2024-05-01 RX ORDER — PROPOFOL 10 MG/ML
INJECTION, EMULSION INTRAVENOUS PRN
Status: DISCONTINUED | OUTPATIENT
Start: 2024-05-01 | End: 2024-05-01 | Stop reason: SDUPTHER

## 2024-05-01 RX ORDER — METOCLOPRAMIDE HYDROCHLORIDE 5 MG/ML
10 INJECTION INTRAMUSCULAR; INTRAVENOUS
Status: DISCONTINUED | OUTPATIENT
Start: 2024-05-01 | End: 2024-05-01 | Stop reason: HOSPADM

## 2024-05-01 RX ORDER — ROPIVACAINE HYDROCHLORIDE 5 MG/ML
INJECTION, SOLUTION EPIDURAL; INFILTRATION; PERINEURAL PRN
Status: DISCONTINUED | OUTPATIENT
Start: 2024-05-01 | End: 2024-05-01 | Stop reason: SDUPTHER

## 2024-05-01 RX ORDER — SODIUM CHLORIDE 9 MG/ML
INJECTION, SOLUTION INTRAVENOUS PRN
Status: DISCONTINUED | OUTPATIENT
Start: 2024-05-01 | End: 2024-05-01 | Stop reason: HOSPADM

## 2024-05-01 RX ORDER — SODIUM CHLORIDE 0.9 % (FLUSH) 0.9 %
5-40 SYRINGE (ML) INJECTION EVERY 12 HOURS SCHEDULED
Status: DISCONTINUED | OUTPATIENT
Start: 2024-05-01 | End: 2024-05-01 | Stop reason: HOSPADM

## 2024-05-01 RX ORDER — TRANEXAMIC ACID 100 MG/ML
INJECTION, SOLUTION INTRAVENOUS PRN
Status: DISCONTINUED | OUTPATIENT
Start: 2024-05-01 | End: 2024-05-01 | Stop reason: SDUPTHER

## 2024-05-01 RX ORDER — DIPHENHYDRAMINE HYDROCHLORIDE 50 MG/ML
12.5 INJECTION INTRAMUSCULAR; INTRAVENOUS
Status: DISCONTINUED | OUTPATIENT
Start: 2024-05-01 | End: 2024-05-01 | Stop reason: HOSPADM

## 2024-05-01 RX ORDER — SODIUM CHLORIDE, SODIUM LACTATE, POTASSIUM CHLORIDE, CALCIUM CHLORIDE 600; 310; 30; 20 MG/100ML; MG/100ML; MG/100ML; MG/100ML
INJECTION, SOLUTION INTRAVENOUS CONTINUOUS PRN
Status: DISCONTINUED | OUTPATIENT
Start: 2024-05-01 | End: 2024-05-01 | Stop reason: SDUPTHER

## 2024-05-01 RX ORDER — OXYCODONE HYDROCHLORIDE 5 MG/1
5 TABLET ORAL
Status: DISCONTINUED | OUTPATIENT
Start: 2024-05-01 | End: 2024-05-01 | Stop reason: HOSPADM

## 2024-05-01 RX ORDER — SODIUM CHLORIDE 0.9 % (FLUSH) 0.9 %
5-40 SYRINGE (ML) INJECTION EVERY 12 HOURS SCHEDULED
Status: CANCELLED | OUTPATIENT
Start: 2024-05-01

## 2024-05-01 RX ORDER — SODIUM CHLORIDE 0.9 % (FLUSH) 0.9 %
5-40 SYRINGE (ML) INJECTION PRN
Status: DISCONTINUED | OUTPATIENT
Start: 2024-05-01 | End: 2024-05-01 | Stop reason: HOSPADM

## 2024-05-01 RX ORDER — MEPERIDINE HYDROCHLORIDE 25 MG/ML
12.5 INJECTION INTRAMUSCULAR; INTRAVENOUS; SUBCUTANEOUS
Status: DISCONTINUED | OUTPATIENT
Start: 2024-05-01 | End: 2024-05-01 | Stop reason: HOSPADM

## 2024-05-01 RX ORDER — ACETAMINOPHEN 325 MG/1
650 TABLET ORAL EVERY 6 HOURS
Status: CANCELLED | OUTPATIENT
Start: 2024-05-01

## 2024-05-01 RX ORDER — ACETAMINOPHEN 500 MG
1000 TABLET ORAL ONCE
Status: COMPLETED | OUTPATIENT
Start: 2024-05-01 | End: 2024-05-01

## 2024-05-01 RX ORDER — ONDANSETRON 2 MG/ML
4 INJECTION INTRAMUSCULAR; INTRAVENOUS EVERY 6 HOURS PRN
Status: CANCELLED | OUTPATIENT
Start: 2024-05-01

## 2024-05-01 RX ORDER — OXYCODONE HYDROCHLORIDE 5 MG/1
2.5 TABLET ORAL EVERY 4 HOURS PRN
Status: CANCELLED | OUTPATIENT
Start: 2024-05-01

## 2024-05-01 RX ORDER — NALOXONE HYDROCHLORIDE 0.4 MG/ML
INJECTION, SOLUTION INTRAMUSCULAR; INTRAVENOUS; SUBCUTANEOUS PRN
Status: DISCONTINUED | OUTPATIENT
Start: 2024-05-01 | End: 2024-05-01 | Stop reason: HOSPADM

## 2024-05-01 RX ORDER — TRANEXAMIC ACID 100 MG/ML
INJECTION, SOLUTION INTRAVENOUS PRN
Status: DISCONTINUED | OUTPATIENT
Start: 2024-05-01 | End: 2024-05-01

## 2024-05-01 RX ORDER — KETOROLAC TROMETHAMINE 15 MG/ML
7.5 INJECTION, SOLUTION INTRAMUSCULAR; INTRAVENOUS EVERY 6 HOURS
Status: CANCELLED | OUTPATIENT
Start: 2024-05-01 | End: 2024-05-02

## 2024-05-01 RX ORDER — HYDROXYZINE HYDROCHLORIDE 25 MG/1
25 TABLET, FILM COATED ORAL EVERY 6 HOURS
Status: CANCELLED | OUTPATIENT
Start: 2024-05-01

## 2024-05-01 RX ORDER — SENNA AND DOCUSATE SODIUM 50; 8.6 MG/1; MG/1
1 TABLET, FILM COATED ORAL 2 TIMES DAILY
Status: CANCELLED | OUTPATIENT
Start: 2024-05-01

## 2024-05-01 RX ORDER — CYCLOBENZAPRINE HCL 10 MG
10 TABLET ORAL 3 TIMES DAILY PRN
Status: CANCELLED | OUTPATIENT
Start: 2024-05-01

## 2024-05-01 RX ORDER — MIDAZOLAM HYDROCHLORIDE 1 MG/ML
INJECTION INTRAMUSCULAR; INTRAVENOUS PRN
Status: DISCONTINUED | OUTPATIENT
Start: 2024-05-01 | End: 2024-05-01 | Stop reason: SDUPTHER

## 2024-05-01 RX ORDER — SODIUM CHLORIDE 0.9 % (FLUSH) 0.9 %
5-40 SYRINGE (ML) INJECTION PRN
Status: CANCELLED | OUTPATIENT
Start: 2024-05-01

## 2024-05-01 RX ORDER — BUPIVACAINE HYDROCHLORIDE 7.5 MG/ML
INJECTION, SOLUTION INTRASPINAL PRN
Status: DISCONTINUED | OUTPATIENT
Start: 2024-05-01 | End: 2024-05-01 | Stop reason: SDUPTHER

## 2024-05-01 RX ORDER — SODIUM CHLORIDE 9 MG/ML
INJECTION, SOLUTION INTRAVENOUS PRN
Status: CANCELLED | OUTPATIENT
Start: 2024-05-01

## 2024-05-01 RX ORDER — ONDANSETRON 2 MG/ML
4 INJECTION INTRAMUSCULAR; INTRAVENOUS
Status: DISCONTINUED | OUTPATIENT
Start: 2024-05-01 | End: 2024-05-01 | Stop reason: HOSPADM

## 2024-05-01 RX ORDER — ONDANSETRON 4 MG/1
4 TABLET, ORALLY DISINTEGRATING ORAL EVERY 8 HOURS PRN
Status: CANCELLED | OUTPATIENT
Start: 2024-05-01

## 2024-05-01 RX ORDER — SODIUM CHLORIDE, SODIUM LACTATE, POTASSIUM CHLORIDE, CALCIUM CHLORIDE 600; 310; 30; 20 MG/100ML; MG/100ML; MG/100ML; MG/100ML
INJECTION, SOLUTION INTRAVENOUS CONTINUOUS
Status: DISCONTINUED | OUTPATIENT
Start: 2024-05-01 | End: 2024-05-01 | Stop reason: HOSPADM

## 2024-05-01 RX ORDER — FENTANYL CITRATE 0.05 MG/ML
50 INJECTION, SOLUTION INTRAMUSCULAR; INTRAVENOUS EVERY 10 MIN PRN
Status: DISCONTINUED | OUTPATIENT
Start: 2024-05-01 | End: 2024-05-01 | Stop reason: HOSPADM

## 2024-05-01 RX ORDER — OXYCODONE HYDROCHLORIDE 5 MG/1
5 TABLET ORAL EVERY 4 HOURS PRN
Status: CANCELLED | OUTPATIENT
Start: 2024-05-01

## 2024-05-01 RX ORDER — CELECOXIB 200 MG/1
200 CAPSULE ORAL ONCE
Status: COMPLETED | OUTPATIENT
Start: 2024-05-01 | End: 2024-05-01

## 2024-05-01 RX ORDER — MAGNESIUM HYDROXIDE/ALUMINUM HYDROXICE/SIMETHICONE 120; 1200; 1200 MG/30ML; MG/30ML; MG/30ML
15 SUSPENSION ORAL EVERY 6 HOURS PRN
Status: CANCELLED | OUTPATIENT
Start: 2024-05-01

## 2024-05-01 RX ORDER — OXYCODONE HCL 10 MG/1
10 TABLET, FILM COATED, EXTENDED RELEASE ORAL ONCE
Status: COMPLETED | OUTPATIENT
Start: 2024-05-01 | End: 2024-05-01

## 2024-05-01 RX ORDER — SODIUM CHLORIDE 9 MG/ML
INJECTION, SOLUTION INTRAVENOUS CONTINUOUS
Status: CANCELLED | OUTPATIENT
Start: 2024-05-01

## 2024-05-01 RX ADMIN — PROPOFOL 100 MCG/KG/MIN: 10 INJECTION, EMULSION INTRAVENOUS at 07:51

## 2024-05-01 RX ADMIN — CELECOXIB 200 MG: 200 CAPSULE ORAL at 06:52

## 2024-05-01 RX ADMIN — ROPIVACAINE HYDROCHLORIDE 30 ML: 5 INJECTION, SOLUTION EPIDURAL; INFILTRATION; PERINEURAL at 07:29

## 2024-05-01 RX ADMIN — MIDAZOLAM HYDROCHLORIDE 2 MG: 1 INJECTION, SOLUTION INTRAMUSCULAR; INTRAVENOUS at 07:24

## 2024-05-01 RX ADMIN — ACETAMINOPHEN 1000 MG: 500 TABLET ORAL at 06:52

## 2024-05-01 RX ADMIN — BUPIVACAINE HYDROCHLORIDE IN DEXTROSE 1.5 ML: 7.5 INJECTION, SOLUTION SUBARACHNOID at 07:44

## 2024-05-01 RX ADMIN — TRANEXAMIC ACID 300 MG: 1 INJECTION, SOLUTION INTRAVENOUS at 08:00

## 2024-05-01 RX ADMIN — SODIUM CHLORIDE, POTASSIUM CHLORIDE, SODIUM LACTATE AND CALCIUM CHLORIDE 1000 ML: 600; 310; 30; 20 INJECTION, SOLUTION INTRAVENOUS at 06:50

## 2024-05-01 RX ADMIN — OXYCODONE HYDROCHLORIDE 10 MG: 10 TABLET, FILM COATED, EXTENDED RELEASE ORAL at 06:53

## 2024-05-01 RX ADMIN — CEFAZOLIN 2000 MG: 2 INJECTION, POWDER, FOR SOLUTION INTRAMUSCULAR; INTRAVENOUS at 07:54

## 2024-05-01 RX ADMIN — SODIUM CHLORIDE, POTASSIUM CHLORIDE, SODIUM LACTATE AND CALCIUM CHLORIDE: 600; 310; 30; 20 INJECTION, SOLUTION INTRAVENOUS at 07:46

## 2024-05-01 RX ADMIN — PROPOFOL 50 MG: 10 INJECTION, EMULSION INTRAVENOUS at 07:50

## 2024-05-01 ASSESSMENT — PAIN SCALES - GENERAL
PAINLEVEL_OUTOF10: 0
PAINLEVEL_OUTOF10: 0
PAINLEVEL_OUTOF10: 9
PAINLEVEL_OUTOF10: 9
PAINLEVEL_OUTOF10: 0
PAINLEVEL_OUTOF10: 9
PAINLEVEL_OUTOF10: 0

## 2024-05-01 ASSESSMENT — PAIN DESCRIPTION - ORIENTATION
ORIENTATION: RIGHT

## 2024-05-01 ASSESSMENT — PAIN DESCRIPTION - DESCRIPTORS
DESCRIPTORS: ACHING

## 2024-05-01 ASSESSMENT — PAIN DESCRIPTION - LOCATION
LOCATION: KNEE

## 2024-05-01 NOTE — ANESTHESIA PROCEDURE NOTES
Peripheral Block    Patient location during procedure: pre-op  Reason for block: post-op pain management and at surgeon's request  Start time: 5/1/2024 7:25 AM  End time: 5/1/2024 7:28 AM  Staffing  Performed: anesthesiologist   Anesthesiologist: Nelson Taylor MD  Performed by: Nelson Taylor MD  Authorized by: Nelson Taylor MD    Preanesthetic Checklist  Completed: patient identified, IV checked, site marked, risks and benefits discussed, surgical/procedural consents, equipment checked, pre-op evaluation, timeout performed, anesthesia consent given, oxygen available and monitors applied/VS acknowledged  Peripheral Block   Patient position: supine  Prep: ChloraPrep  Provider prep: mask and sterile gloves (Sterile probe cover)  Patient monitoring: cardiac monitor, continuous pulse ox, frequent blood pressure checks and IV access  Block type: Saphenous  Laterality: right  Injection technique: single-shot  Guidance: ultrasound guided  Local infiltration: ropivacaine  Infiltration strength: 0.5 %  Local infiltration: ropivacaine  Dose: 20 mL    Needle   Needle type: combined needle/nerve stimulator   Needle gauge: 21 G  Needle localization: anatomical landmarks and ultrasound guidance  Needle length: 10 cm  Assessment   Injection assessment: negative aspiration for heme, no paresthesia on injection and local visualized surrounding nerve on ultrasound  Paresthesia pain: immediately resolved  Slow fractionated injection: yes  Hemodynamics: stable    Additional Notes  Ultrasound image printed and saved in patient chart.    Sterile probe cover used

## 2024-05-01 NOTE — ANESTHESIA POSTPROCEDURE EVALUATION
Department of Anesthesiology  Postprocedure Note    Patient: Lin Liang  MRN: [id number]  YOB: 1965  Date of evaluation: 5/1/2024    Procedure Summary       Date: 05/01/24 Room / Location: [address]    Anesthesia Start: 0746 Anesthesia Stop:     Procedure: Right total knee arthroplasty Vendor: [employer] C-arm intraoperatively Anesthesia: spinal; regional block Positioning: supine latex allergy, medacta aware/ JARAD (Right) Diagnosis:       Osteoarthritis of right knee      (Osteoarthritis of right knee [M17.11])    Surgeons: Alex Esposito DO Responsible Provider: Nelson Taylor MD    Anesthesia Type: MAC, regional, spinal ASA Status: 3            Anesthesia Type: No value filed.    Jc Phase I: Jc Score: 10    Jc Phase II:      Anesthesia Post Evaluation    Patient location during evaluation: PACU  Patient participation: complete - patient participated  Level of consciousness: awake and alert  Pain score: 0  Airway patency: patent  Nausea & Vomiting: no vomiting and no nausea  Cardiovascular status: hemodynamically stable and bradycardic  Respiratory status: nasal cannula  Hydration status: stable  Multimodal analgesia pain management approach  Pain management: adequate    No notable events documented.

## 2024-05-01 NOTE — FLOWSHEET NOTE
Patient ambulated to bathroom with walker and voided with no difficulty.  VSS.  Patient states full sensation in all extremities and able to move all limbs.  IV out, waiting for ride.

## 2024-05-01 NOTE — PROGRESS NOTES
Pt to pacu @ 0815, sleeping under sedation. MP SB-Dr PORTILLO at bedside and aware. Surgery was cancelled due to equipment issues.    Dr Esposito to bedside @ 0905, informed pt of need to reschedule procedure.  Pt alert, but falls asleep easily.

## 2024-05-01 NOTE — ANESTHESIA PROCEDURE NOTES
Peripheral Block    Patient location during procedure: pre-op  Reason for block: post-op pain management and at surgeon's request  Start time: 5/1/2024 7:29 AM  End time: 5/1/2024 7:31 AM  Staffing  Performed: anesthesiologist   Anesthesiologist: Nelson Taylor MD  Performed by: Nelson Taylor MD  Authorized by: Nelson Taylor MD    Preanesthetic Checklist  Completed: patient identified, IV checked, site marked, risks and benefits discussed, surgical/procedural consents, equipment checked, pre-op evaluation, timeout performed, anesthesia consent given, oxygen available and monitors applied/VS acknowledged  Peripheral Block   Patient position: supine  Prep: ChloraPrep  Provider prep: mask and sterile gloves (Sterile probe cover)  Patient monitoring: cardiac monitor, continuous pulse ox, frequent blood pressure checks and IV access  Block type: iPacks  Laterality: right  Injection technique: single-shot  Guidance: ultrasound guided  Local infiltration: ropivacaine  Infiltration strength: 0.5 %  Local infiltration: ropivacaine  Dose: 10 mL    Needle   Needle type: combined needle/nerve stimulator   Needle gauge: 21 G  Needle localization: anatomical landmarks and ultrasound guidance  Needle length: 10 cm  Assessment   Injection assessment: negative aspiration for heme, no paresthesia on injection and local visualized surrounding nerve on ultrasound  Paresthesia pain: immediately resolved  Slow fractionated injection: yes  Hemodynamics: stable    Additional Notes  Ultrasound image printed and saved in patient chart.    Sterile probe cover used

## 2024-05-01 NOTE — ANESTHESIA PRE PROCEDURE
Department of Anesthesiology  Preprocedure Note       Name:  Lin Liang   Age:  59 y.o.  :  1965                                          MRN:  [id number]         Date:  2024      Surgeon: Surgeon(s):  Alex Esposito DO    Procedure: Procedure(s):  Right total knee arthroplasty Vendor: [employer] C-arm intraoperatively Anesthesia: spinal; regional block Positioning: supine latex allergy, medacta aware/ JARAD    Medications prior to admission:   Prior to Admission medications    Medication Sig Start Date End Date Taking? Authorizing Provider   Misc. Devices (BATH/SHOWER SEAT) MISC Dispense 1 Shower/Bath seat 24   Jeremie Jamil PA   Misc. Devices (CLASSICS ROLLING WALKER) MISC Dispense 1 rolling walker 24   Jeremie Jamil PA   Misc. Devices (RAISED TOILET SEAT) MISC Dispense 1 raised toilet seat 24   Jeremie Jamil PA   DULoxetine (CYMBALTA) 30 MG extended release capsule Take 1 capsule by mouth daily 10/20/23   ProviderMannie MD   DULoxetine (CYMBALTA) 60 MG extended release capsule Take 1 capsule by mouth daily 10/20/23   Provider, MD Mannie   triamcinolone (KENALOG) 0.1 % cream  5/3/23   ProviderMannie MD   tiotropium (SPIRIVA) 18 MCG inhalation capsule Inhale into the lungs 10/12/17   ProviderMannie MD   prazosin (MINIPRESS) 1 MG capsule  23   ProviderMannie MD   ketoconazole (NIZORAL) 2 % cream Apply topically    ProviderMannie MD   hydrOXYzine pamoate (VISTARIL) 25 MG capsule Take 1 capsule by mouth 3 times daily as needed for Anxiety 23   ProviderMannie MD   fluticasone (FLOVENT HFA) 220 MCG/ACT inhaler Inhale into the lungs 3/16/16   ProviderMannie MD   ARIPiprazole (ABILIFY) 2 MG tablet Take by mouth 23   ProviderMannie MD   clotrimazole (LOTRIMIN) 1 % cream     ProviderMannie MD   Handicap Placard MISC by Does not apply route Good for 6 six months. 10/10/23   Alex Esposito DO

## 2024-05-01 NOTE — ANESTHESIA PROCEDURE NOTES
Spinal Block    Patient location during procedure: pre-op  End time: 5/1/2024 7:44 AM  Reason for block: primary anesthetic  Staffing  Performed: anesthesiologist   Anesthesiologist: Nelson Taylor MD  Performed by: Nelson Taylor MD  Authorized by: Nelson Taylor MD    Spinal Block  Patient position: sitting  Prep: Betadine  Patient monitoring: cardiac monitor, continuous pulse ox and frequent blood pressure checks  Approach: right paramedian  Location: L3/L4  Provider prep: mask and sterile gloves  Local infiltration: lidocaine  Needle  Needle type: Quincke   Needle gauge: 22 G  Needle length: 3.5 in  Assessment  Sensory level: T6  Events: cerebrospinal fluid  Lysis level: CSF aspirated.  CSF: clear  Attempts: 3+  Hemodynamics: stable  Additional Notes  Free flowing CSF.  Negative heme.  Negative paresthesia.  .  Preanesthetic Checklist  Completed: patient identified, IV checked, site marked, risks and benefits discussed, surgical/procedural consents, equipment checked, pre-op evaluation, timeout performed, anesthesia consent given, oxygen available and monitors applied/VS acknowledged

## 2024-05-01 NOTE — BRIEF OP NOTE
Brief Postoperative Note      Patient: Lin Liang  YOB: 1965  MRN: [id number]    Date of Procedure: 5/1/2024    Pre-Op Diagnosis Codes:     * Osteoarthritis of right knee [M17.11]    Post-Op Diagnosis: Same       Procedure was CANCELED due to lack of available instrumentation to perform the right total knee arthroplasty.  No incision was made.  Patient was given a spinal anesthetic, tranexamic acid, and an antibiotic.  Once we understood that we did not have the appropriate instrumentation to complete the case, the decision was made to cancel the case due to already implanted femoral tristian and tibial nail.  I spoke with the OR staff, the office staff, and the patient's family member letting them know my decision that we needed to cancel and reschedule because of the after mentioned issues.    Surgeon(s):  Alex Esposito DO John Balk, DO  Orthopedic and Spine Surgeon  [facility]  [phone]    Electronically signed by Alex Esposito DO on 5/1/2024 at 8:26 AM

## 2024-05-07 ENCOUNTER — PREP FOR PROCEDURE (OUTPATIENT)
Dept: ORTHOPEDIC SURGERY | Age: 59
End: 2024-05-07

## 2024-07-12 ENCOUNTER — APPOINTMENT (OUTPATIENT)
Dept: CARDIOLOGY | Facility: CLINIC | Age: 59
End: 2024-07-12
Payer: COMMERCIAL

## 2025-01-20 ENCOUNTER — TELEPHONE (OUTPATIENT)
Dept: CARDIOLOGY | Facility: CLINIC | Age: 60
End: 2025-01-20
Payer: COMMERCIAL

## 2025-01-20 NOTE — TELEPHONE ENCOUNTER
Rec'd fax from [facility] requesting refill of Xarelto 20 mg and Rosuvastatin 40 mg. Pt last seen 1/9/24 with orders to follow up 6 months.     Pt needs pending appt for refills. Routed to Clerical for follow up.

## 2025-01-23 DIAGNOSIS — G25.81 RLS (RESTLESS LEGS SYNDROME): ICD-10-CM

## 2025-01-23 DIAGNOSIS — R93.1 ABNORMAL ECHOCARDIOGRAM: ICD-10-CM

## 2025-01-23 DIAGNOSIS — G47.33 OBSTRUCTIVE SLEEP APNEA: ICD-10-CM

## 2025-01-23 DIAGNOSIS — F41.0 PANIC ATTACKS: ICD-10-CM

## 2025-01-23 DIAGNOSIS — M79.7 FIBROMYALGIA: ICD-10-CM

## 2025-01-23 DIAGNOSIS — R60.0 BILATERAL EDEMA OF LOWER EXTREMITY: ICD-10-CM

## 2025-01-23 DIAGNOSIS — J44.9 CHRONIC OBSTRUCTIVE PULMONARY DISEASE, UNSPECIFIED COPD TYPE (MULTI): ICD-10-CM

## 2025-01-23 DIAGNOSIS — E03.9 ACQUIRED HYPOTHYROIDISM: ICD-10-CM

## 2025-01-23 DIAGNOSIS — R06.02 EXERTIONAL SHORTNESS OF BREATH: ICD-10-CM

## 2025-01-23 DIAGNOSIS — I26.92 SADDLE EMBOLUS OF PULMONARY ARTERY, UNSPECIFIED CHRONICITY, UNSPECIFIED WHETHER ACUTE COR PULMONALE PRESENT (MULTI): ICD-10-CM

## 2025-01-23 DIAGNOSIS — R55 SYNCOPE, UNSPECIFIED SYNCOPE TYPE: ICD-10-CM

## 2025-01-23 DIAGNOSIS — E78.2 MIXED HYPERLIPIDEMIA: ICD-10-CM

## 2025-01-23 DIAGNOSIS — F17.200 CURRENT EVERY DAY SMOKER: ICD-10-CM

## 2025-01-23 DIAGNOSIS — Z01.818 PRE-OPERATIVE EXAMINATION: ICD-10-CM

## 2025-01-23 DIAGNOSIS — G89.29 OTHER CHRONIC PAIN: ICD-10-CM

## 2025-01-23 DIAGNOSIS — E55.9 VITAMIN D DEFICIENCY: ICD-10-CM

## 2025-01-23 DIAGNOSIS — I10 PRIMARY HYPERTENSION: ICD-10-CM

## 2025-01-23 DIAGNOSIS — G43.909 MIGRAINE WITHOUT STATUS MIGRAINOSUS, NOT INTRACTABLE, UNSPECIFIED MIGRAINE TYPE: ICD-10-CM

## 2025-01-23 DIAGNOSIS — F41.9 ANXIETY: ICD-10-CM

## 2025-01-23 RX ORDER — RIVAROXABAN 20 MG/1
TABLET, FILM COATED ORAL
Qty: 30 TABLET | Refills: 10 | OUTPATIENT
Start: 2025-01-23

## 2025-01-23 RX ORDER — ROSUVASTATIN CALCIUM 40 MG/1
TABLET, COATED ORAL
Qty: 30 TABLET | Refills: 10 | OUTPATIENT
Start: 2025-01-23

## 2025-01-24 RX ORDER — ROSUVASTATIN CALCIUM 40 MG/1
TABLET, COATED ORAL
Qty: 1 TABLET | Refills: 0 | OUTPATIENT
Start: 2025-01-24

## 2025-01-24 RX ORDER — RIVAROXABAN 20 MG/1
TABLET, FILM COATED ORAL
Qty: 1 TABLET | Refills: 0 | OUTPATIENT
Start: 2025-01-24

## 2025-01-24 NOTE — TELEPHONE ENCOUNTER
Rec'd refill request through [facility].    Pt last seen 1/9/2024 with orders to follow up 6 months. Pt has no pending appts. Routed to CHERRI Cruz to deny prescriptions with comments to pharmacy that we are unable to reach pt for follow up appt.     Pt's phone disconnected.   Left message for pt's daughter Jessica, identified by name to have mother call the office to schedule appt for med refills or to advise if following with another Cardiologist.

## 2025-04-28 NOTE — PROGRESS NOTES
Physical Therapy Missed Treatment   Facility/Department: [facility] U599/E317-62    NAME: Eleuterio Joyner    : 1965 (59 y.o.)  MRN: [id number]    Account: [de-identified]  Gender: female      PT evaluation and treatment orders received. Chart reviewed. PT evaluation attempted. Pt graciously declining. \"I was just about to fall asleep. I've been so uncomfortable all day. \" Pt agreeable to PT evaluation tomorrow. Nursing staff notified. Will follow and attempt PT evaluation again at earliest availability.        Marshal Roe, PT, 22 at 2:14 PM The patient is Stable - Low risk of patient condition declining or worsening    Shift Goals  Clinical Goals: Safety  Patient Goals: Remain updated on POC      Progress made toward(s) clinical / shift goals:     Problem: Knowledge Deficit - Standard  Goal: Patient and family/care givers will demonstrate understanding of plan of care, disease process/condition, diagnostic tests and medications  Outcome: Progressing     Problem: Skin Integrity  Goal: Skin integrity is maintained or improved  Outcome: Progressing     Problem: Fall Risk  Goal: Patient will remain free from falls  Outcome: Progressing

## (undated) DEVICE — PAD ABSRB W8XL10IN ABD HYDROPHOBIC NONWOVEN THCK LAYR CELOS

## (undated) DEVICE — 4-PORT MANIFOLD: Brand: NEPTUNE 2

## (undated) DEVICE — GLOVE ORANGE PI 8 1/2   MSG9085

## (undated) DEVICE — DEVICE POS W4INXL5YD KNEE SURG FOAM PD SELF ADH WRP DEMAYO

## (undated) DEVICE — ALCOHOL RUBBING ISO 16OZ 70%

## (undated) DEVICE — SPONGE GZ W4XL4IN COT 12 PLY TYP VII WVN C FLD DSGN STERILE

## (undated) DEVICE — SUTURE VICRYL SZ 1 L36IN ABSRB UD L36MM CT-1 1/2 CIR J947H

## (undated) DEVICE — BIPOLAR SEALER 23-112-1 AQM 6.0: Brand: AQUAMANTYS ®

## (undated) DEVICE — SUTURE VICRYL SZ 2-0 L36IN ABSRB UD L36MM CT-1 1/2 CIR J945H

## (undated) DEVICE — SUTURE MONOCRYL STRATAFIX SPRL + SZ 3-0 L18IN ABSRB UD PS-2 SXMP1B107

## (undated) DEVICE — GOWN,AURORA,NONRNF,XL,30/CS: Brand: MEDLINE

## (undated) DEVICE — 3M™ STERI-DRAPE™ INSTRUMENT POUCH 1018: Brand: STERI-DRAPE™

## (undated) DEVICE — 3 BONE CEMENT MIXER WITH SPATULA: Brand: MIXEVAC, ACM

## (undated) DEVICE — SYRINGE, LUER LOCK, 10ML: Brand: MEDLINE

## (undated) DEVICE — SYRINGE IRRIG 60ML SFT PLIABLE BLB EZ TO GRP 1 HND USE W/

## (undated) DEVICE — PADDING CAST W4INXL4YD SPUN DACRON POLY POR NON STERILE

## (undated) DEVICE — SUTURE ABSORBABLE ANTIBACT 1-0 CT-1 24 IN STRATAFIX PDS + SXPP1A443

## (undated) DEVICE — TOTAL KNEE: Brand: MEDLINE INDUSTRIES, INC.

## (undated) DEVICE — STRYKER PERFORMANCE SERIES SAGITTAL BLADE: Brand: STRYKER PERFORMANCE SERIES

## (undated) DEVICE — BANDAGE COBAN 4 IN COMPR W4INXL5YD FOAM COHESIVE QUIK STK SELF ADH SFT

## (undated) DEVICE — STERILE PATIENT PROTECTIVE PAD FOR IMP® KNEE POSITIONERS & COHESIVE WRAP (10 / CASE): Brand: DE MAYO KNEE POSITIONER®

## (undated) DEVICE — FAN SPRAY KIT: Brand: PULSAVAC®